# Patient Record
Sex: FEMALE | Race: WHITE | NOT HISPANIC OR LATINO | Employment: OTHER | URBAN - METROPOLITAN AREA
[De-identification: names, ages, dates, MRNs, and addresses within clinical notes are randomized per-mention and may not be internally consistent; named-entity substitution may affect disease eponyms.]

---

## 2017-01-16 RX ORDER — RANOLAZINE 500 MG/1
TABLET, FILM COATED, EXTENDED RELEASE ORAL
Qty: 180 TABLET | Refills: 1 | Status: ON HOLD | OUTPATIENT
Start: 2017-01-16 | End: 2022-11-03

## 2017-01-16 RX ORDER — ATORVASTATIN CALCIUM 20 MG/1
TABLET, FILM COATED ORAL
Qty: 90 TABLET | Refills: 1 | Status: ON HOLD | OUTPATIENT
Start: 2017-01-16 | End: 2022-11-03

## 2022-10-27 ENCOUNTER — APPOINTMENT (OUTPATIENT)
Dept: GENERAL RADIOLOGY | Facility: HOSPITAL | Age: 62
End: 2022-10-27

## 2022-10-27 ENCOUNTER — HOSPITAL ENCOUNTER (OUTPATIENT)
Facility: HOSPITAL | Age: 62
Setting detail: SURGERY ADMIT
End: 2022-10-27
Attending: THORACIC SURGERY (CARDIOTHORACIC VASCULAR SURGERY) | Admitting: THORACIC SURGERY (CARDIOTHORACIC VASCULAR SURGERY)

## 2022-10-27 ENCOUNTER — HOSPITAL ENCOUNTER (INPATIENT)
Facility: HOSPITAL | Age: 62
LOS: 9 days | Discharge: HOME | End: 2022-11-05
Attending: THORACIC SURGERY (CARDIOTHORACIC VASCULAR SURGERY) | Admitting: THORACIC SURGERY (CARDIOTHORACIC VASCULAR SURGERY)

## 2022-10-27 DIAGNOSIS — I25.10 CORONARY ARTERY DISEASE INVOLVING NATIVE CORONARY ARTERY OF NATIVE HEART WITHOUT ANGINA PECTORIS: Primary | ICD-10-CM

## 2022-10-27 DIAGNOSIS — Z00.6 ENCOUNTER FOR EXAMINATION FOR NORMAL COMPARISON AND CONTROL IN CLINICAL RESEARCH PROGRAM: ICD-10-CM

## 2022-10-27 DIAGNOSIS — I21.4 NSTEMI (NON-ST ELEVATED MYOCARDIAL INFARCTION): Primary | ICD-10-CM

## 2022-10-27 DIAGNOSIS — R13.12 OROPHARYNGEAL DYSPHAGIA: ICD-10-CM

## 2022-10-27 PROCEDURE — 93005 ELECTROCARDIOGRAM TRACING: CPT | Performed by: PHYSICIAN ASSISTANT

## 2022-10-27 PROCEDURE — 99223 1ST HOSP IP/OBS HIGH 75: CPT | Performed by: THORACIC SURGERY (CARDIOTHORACIC VASCULAR SURGERY)

## 2022-10-27 RX ORDER — ACETAMINOPHEN 325 MG/1
650 TABLET ORAL EVERY 4 HOURS PRN
Status: DISCONTINUED | OUTPATIENT
Start: 2022-10-27 | End: 2022-11-05 | Stop reason: HOSPADM

## 2022-10-27 RX ORDER — ASPIRIN 81 MG/1
81 TABLET ORAL ONCE
Status: COMPLETED | OUTPATIENT
Start: 2022-10-28 | End: 2022-10-28

## 2022-10-27 RX ORDER — ESCITALOPRAM OXALATE 10 MG/1
10 TABLET ORAL DAILY
Status: DISCONTINUED | OUTPATIENT
Start: 2022-10-28 | End: 2022-11-05 | Stop reason: HOSPADM

## 2022-10-27 RX ORDER — METOPROLOL TARTRATE 50 MG/1
50 TABLET, FILM COATED ORAL EVERY 12 HOURS SCHEDULED
Status: DISCONTINUED | OUTPATIENT
Start: 2022-10-28 | End: 2022-10-28 | Stop reason: SDUPTHER

## 2022-10-27 RX ORDER — IPRATROPIUM BROMIDE AND ALBUTEROL SULFATE 2.5; .5 MG/3ML; MG/3ML
3 SOLUTION RESPIRATORY (INHALATION) EVERY 4 HOURS PRN
Status: DISCONTINUED | OUTPATIENT
Start: 2022-10-27 | End: 2022-11-05 | Stop reason: HOSPADM

## 2022-10-27 RX ORDER — CHLORHEXIDINE GLUCONATE 0.12 MG/ML
15 RINSE ORAL EVERY 12 HOURS
Status: DISCONTINUED | OUTPATIENT
Start: 2022-10-28 | End: 2022-10-28 | Stop reason: SDUPTHER

## 2022-10-27 RX ORDER — CHLORHEXIDINE GLUCONATE 500 MG/1
1 CLOTH TOPICAL EVERY 12 HOURS PRN
Status: DISCONTINUED | OUTPATIENT
Start: 2022-10-27 | End: 2022-10-28

## 2022-10-27 RX ORDER — SODIUM CHLORIDE 0.9 % (FLUSH) 0.9 %
10 SYRINGE (ML) INJECTION AS NEEDED
Status: DISCONTINUED | OUTPATIENT
Start: 2022-10-27 | End: 2022-11-05 | Stop reason: HOSPADM

## 2022-10-27 RX ORDER — ATORVASTATIN CALCIUM 20 MG/1
20 TABLET, FILM COATED ORAL NIGHTLY
Status: DISCONTINUED | OUTPATIENT
Start: 2022-10-27 | End: 2022-10-28 | Stop reason: SDUPTHER

## 2022-10-27 RX ORDER — SODIUM CHLORIDE 0.9 % (FLUSH) 0.9 %
10 SYRINGE (ML) INJECTION EVERY 12 HOURS SCHEDULED
Status: DISCONTINUED | OUTPATIENT
Start: 2022-10-28 | End: 2022-11-05 | Stop reason: HOSPADM

## 2022-10-27 RX ORDER — RANOLAZINE 500 MG/1
500 TABLET, EXTENDED RELEASE ORAL 2 TIMES DAILY
Status: DISCONTINUED | OUTPATIENT
Start: 2022-10-28 | End: 2022-10-28

## 2022-10-28 ENCOUNTER — APPOINTMENT (OUTPATIENT)
Dept: CARDIOLOGY | Facility: HOSPITAL | Age: 62
End: 2022-10-28

## 2022-10-28 ENCOUNTER — ANESTHESIA (OUTPATIENT)
Dept: PERIOP | Facility: HOSPITAL | Age: 62
End: 2022-10-28

## 2022-10-28 ENCOUNTER — ANESTHESIA EVENT CONVERTED (OUTPATIENT)
Dept: ANESTHESIOLOGY | Facility: HOSPITAL | Age: 62
End: 2022-10-28

## 2022-10-28 ENCOUNTER — APPOINTMENT (OUTPATIENT)
Dept: PULMONOLOGY | Facility: HOSPITAL | Age: 62
End: 2022-10-28

## 2022-10-28 ENCOUNTER — APPOINTMENT (OUTPATIENT)
Dept: GENERAL RADIOLOGY | Facility: HOSPITAL | Age: 62
End: 2022-10-28

## 2022-10-28 ENCOUNTER — ANESTHESIA EVENT (OUTPATIENT)
Dept: PERIOP | Facility: HOSPITAL | Age: 62
End: 2022-10-28

## 2022-10-28 PROBLEM — F19.90 ILLICIT DRUG USE: Status: ACTIVE | Noted: 2022-10-28

## 2022-10-28 PROBLEM — I10 HYPERTENSION: Chronic | Status: ACTIVE | Noted: 2022-10-28

## 2022-10-28 PROBLEM — Z72.0 TOBACCO USE: Status: ACTIVE | Noted: 2022-10-28

## 2022-10-28 PROBLEM — Z98.84 HX OF LAPAROSCOPIC GASTRIC BANDING: Status: ACTIVE | Noted: 2022-10-28

## 2022-10-28 PROBLEM — K21.9 GERD (GASTROESOPHAGEAL REFLUX DISEASE): Status: ACTIVE | Noted: 2022-10-28

## 2022-10-28 PROBLEM — E11.9 T2DM (TYPE 2 DIABETES MELLITUS) (HCC): Chronic | Status: ACTIVE | Noted: 2022-10-28

## 2022-10-28 PROBLEM — I21.4 NSTEMI (NON-ST ELEVATED MYOCARDIAL INFARCTION): Status: ACTIVE | Noted: 2022-10-28

## 2022-10-28 PROBLEM — E11.9 T2DM (TYPE 2 DIABETES MELLITUS) (HCC): Status: ACTIVE | Noted: 2022-10-28

## 2022-10-28 PROBLEM — I10 HYPERTENSION: Status: ACTIVE | Noted: 2022-10-28

## 2022-10-28 PROBLEM — Z72.0 TOBACCO USE: Chronic | Status: ACTIVE | Noted: 2022-10-28

## 2022-10-28 PROBLEM — I25.10 CAD (CORONARY ARTERY DISEASE): Chronic | Status: ACTIVE | Noted: 2022-10-27

## 2022-10-28 PROBLEM — K21.9 GERD (GASTROESOPHAGEAL REFLUX DISEASE): Chronic | Status: ACTIVE | Noted: 2022-10-28

## 2022-10-28 PROBLEM — E78.5 DYSLIPIDEMIA: Status: ACTIVE | Noted: 2022-10-28

## 2022-10-28 PROBLEM — E78.5 DYSLIPIDEMIA: Chronic | Status: ACTIVE | Noted: 2022-10-28

## 2022-10-28 PROBLEM — F19.90 ILLICIT DRUG USE: Chronic | Status: ACTIVE | Noted: 2022-10-28

## 2022-10-28 PROBLEM — Z98.84 HX OF LAPAROSCOPIC GASTRIC BANDING: Chronic | Status: ACTIVE | Noted: 2022-10-28

## 2022-10-28 LAB
ABO GROUP BLD: NORMAL
ABO GROUP BLD: NORMAL
ALBUMIN SERPL-MCNC: 3.3 G/DL (ref 3.5–5.2)
ALBUMIN SERPL-MCNC: 3.7 G/DL (ref 3.5–5.2)
ALBUMIN SERPL-MCNC: 3.9 G/DL (ref 3.5–5.2)
ALBUMIN/GLOB SERPL: 1.1 G/DL
ALP SERPL-CCNC: 110 U/L (ref 39–117)
ALT SERPL W P-5'-P-CCNC: 22 U/L (ref 1–33)
AMPHET+METHAMPHET UR QL: POSITIVE
AMPHETAMINES UR QL: POSITIVE
ANION GAP SERPL CALCULATED.3IONS-SCNC: 10 MMOL/L (ref 5–15)
ANION GAP SERPL CALCULATED.3IONS-SCNC: 11 MMOL/L (ref 5–15)
ANION GAP SERPL CALCULATED.3IONS-SCNC: 12 MMOL/L (ref 5–15)
APTT PPP: 26.8 SECONDS (ref 22–39)
APTT PPP: 28.2 SECONDS (ref 22–39)
ARTERIAL PATENCY WRIST A: ABNORMAL
AST SERPL-CCNC: 21 U/L (ref 1–32)
ATMOSPHERIC PRESS: ABNORMAL MM[HG]
BARBITURATES UR QL SCN: NEGATIVE
BASE EXCESS BLDA CALC-SCNC: -2 MMOL/L (ref 0–2)
BDY SITE: ABNORMAL
BENZODIAZ UR QL SCN: NEGATIVE
BH CV ECHO MEAS - AO MAX PG: 6.9 MMHG
BH CV ECHO MEAS - AO MEAN PG: 4 MMHG
BH CV ECHO MEAS - AO ROOT DIAM: 2.8 CM
BH CV ECHO MEAS - AO V2 MAX: 131.5 CM/SEC
BH CV ECHO MEAS - AO V2 VTI: 29.5 CM
BH CV ECHO MEAS - AVA(I,D): 2.26 CM2
BH CV ECHO MEAS - EDV(CUBED): 300.8 ML
BH CV ECHO MEAS - EDV(MOD-SP2): 165 ML
BH CV ECHO MEAS - EDV(MOD-SP4): 166 ML
BH CV ECHO MEAS - EF(MOD-BP): 48.5 %
BH CV ECHO MEAS - EF(MOD-SP2): 46.1 %
BH CV ECHO MEAS - EF(MOD-SP4): 53.1 %
BH CV ECHO MEAS - ESV(CUBED): 117.6 ML
BH CV ECHO MEAS - ESV(MOD-SP2): 89 ML
BH CV ECHO MEAS - ESV(MOD-SP4): 77.8 ML
BH CV ECHO MEAS - FS: 26.9 %
BH CV ECHO MEAS - IVS/LVPW: 1 CM
BH CV ECHO MEAS - IVSD: 0.8 CM
BH CV ECHO MEAS - LA DIMENSION: 3.8 CM
BH CV ECHO MEAS - LAT PEAK E' VEL: 10.1 CM/SEC
BH CV ECHO MEAS - LV MASS(C)D: 226.1 GRAMS
BH CV ECHO MEAS - LV MAX PG: 4.2 MMHG
BH CV ECHO MEAS - LV MEAN PG: 2 MMHG
BH CV ECHO MEAS - LV V1 MAX: 102.5 CM/SEC
BH CV ECHO MEAS - LV V1 VTI: 21.2 CM
BH CV ECHO MEAS - LVIDD: 6.7 CM
BH CV ECHO MEAS - LVIDS: 4.9 CM
BH CV ECHO MEAS - LVOT AREA: 3.1 CM2
BH CV ECHO MEAS - LVOT DIAM: 2 CM
BH CV ECHO MEAS - LVPWD: 0.8 CM
BH CV ECHO MEAS - MED PEAK E' VEL: 6.9 CM/SEC
BH CV ECHO MEAS - MR MAX PG: 99.4 MMHG
BH CV ECHO MEAS - MR MAX VEL: 498.5 CM/SEC
BH CV ECHO MEAS - MR MEAN PG: 70.5 MMHG
BH CV ECHO MEAS - MR MEAN VEL: 404 CM/SEC
BH CV ECHO MEAS - MR VTI: 189.5 CM
BH CV ECHO MEAS - MV A MAX VEL: 83.2 CM/SEC
BH CV ECHO MEAS - MV DEC SLOPE: 449 CM/SEC2
BH CV ECHO MEAS - MV DEC TIME: 0.22 MSEC
BH CV ECHO MEAS - MV E MAX VEL: 101 CM/SEC
BH CV ECHO MEAS - MV E/A: 1.21
BH CV ECHO MEAS - MV MAX PG: 4.5 MMHG
BH CV ECHO MEAS - MV MEAN PG: 2 MMHG
BH CV ECHO MEAS - MV P1/2T: 72.4 MSEC
BH CV ECHO MEAS - MV V2 VTI: 36.9 CM
BH CV ECHO MEAS - MVA(P1/2T): 3 CM2
BH CV ECHO MEAS - MVA(VTI): 1.8 CM2
BH CV ECHO MEAS - PA ACC TIME: 0.13 SEC
BH CV ECHO MEAS - PA PR(ACCEL): 20.5 MMHG
BH CV ECHO MEAS - PA V2 MAX: 98.8 CM/SEC
BH CV ECHO MEAS - RF(MV,LVOT)(1DIAM): 0.79 CM
BH CV ECHO MEAS - SV(LVOT): 66.4 ML
BH CV ECHO MEAS - SV(MOD-SP2): 76 ML
BH CV ECHO MEAS - SV(MOD-SP4): 88.2 ML
BH CV ECHO MEAS - TAPSE (>1.6): 1.75 CM
BH CV ECHO MEASUREMENTS AVERAGE E/E' RATIO: 11.88
BH CV VAS BP RIGHT ARM: NORMAL MMHG
BH CV XLRA - RV BASE: 3.2 CM
BH CV XLRA - RV LENGTH: 6.4 CM
BH CV XLRA - RV MID: 2.3 CM
BH CV XLRA - TDI S': 13.3 CM/SEC
BH CV XLRA MEAS LEFT DIST CCA EDV: 24.7 CM/SEC
BH CV XLRA MEAS LEFT DIST CCA PSV: 83.4 CM/SEC
BH CV XLRA MEAS LEFT DIST ICA EDV: 22.4 CM/SEC
BH CV XLRA MEAS LEFT DIST ICA PSV: 54.4 CM/SEC
BH CV XLRA MEAS LEFT ICA/CCA RATIO: 1.67
BH CV XLRA MEAS LEFT MID CCA EDV: 23 CM/SEC
BH CV XLRA MEAS LEFT MID CCA PSV: 64.8 CM/SEC
BH CV XLRA MEAS LEFT MID ICA EDV: 40.6 CM/SEC
BH CV XLRA MEAS LEFT MID ICA PSV: 108 CM/SEC
BH CV XLRA MEAS LEFT PROX CCA EDV: 32.2 CM/SEC
BH CV XLRA MEAS LEFT PROX CCA PSV: 76.4 CM/SEC
BH CV XLRA MEAS LEFT PROX ECA EDV: 10.8 CM/SEC
BH CV XLRA MEAS LEFT PROX ECA PSV: 58.5 CM/SEC
BH CV XLRA MEAS LEFT PROX ICA EDV: 27.8 CM/SEC
BH CV XLRA MEAS LEFT PROX ICA PSV: 63.4 CM/SEC
BH CV XLRA MEAS LEFT PROX SCLA PSV: 107 CM/SEC
BH CV XLRA MEAS LEFT VERTEBRAL A EDV: 14.1 CM/SEC
BH CV XLRA MEAS LEFT VERTEBRAL A PSV: 34.9 CM/SEC
BH CV XLRA MEAS RIGHT DIST CCA EDV: 22.7 CM/SEC
BH CV XLRA MEAS RIGHT DIST CCA PSV: 54.1 CM/SEC
BH CV XLRA MEAS RIGHT DIST ICA EDV: 45.1 CM/SEC
BH CV XLRA MEAS RIGHT DIST ICA PSV: 149 CM/SEC
BH CV XLRA MEAS RIGHT ICA/CCA RATIO: 2.6
BH CV XLRA MEAS RIGHT MID CCA EDV: 20 CM/SEC
BH CV XLRA MEAS RIGHT MID CCA PSV: 53.3 CM/SEC
BH CV XLRA MEAS RIGHT MID ICA EDV: 62.4 CM/SEC
BH CV XLRA MEAS RIGHT MID ICA PSV: 138 CM/SEC
BH CV XLRA MEAS RIGHT PROX CCA EDV: 20.1 CM/SEC
BH CV XLRA MEAS RIGHT PROX CCA PSV: 59 CM/SEC
BH CV XLRA MEAS RIGHT PROX ECA EDV: 22.7 CM/SEC
BH CV XLRA MEAS RIGHT PROX ECA PSV: 95.6 CM/SEC
BH CV XLRA MEAS RIGHT PROX ICA EDV: 32.3 CM/SEC
BH CV XLRA MEAS RIGHT PROX ICA PSV: 77.7 CM/SEC
BH CV XLRA MEAS RIGHT PROX SCLA PSV: 125 CM/SEC
BH CV XLRA MEAS RIGHT VERTEBRAL A EDV: 20.9 CM/SEC
BH CV XLRA MEAS RIGHT VERTEBRAL A PSV: 57.1 CM/SEC
BILIRUB SERPL-MCNC: 0.4 MG/DL (ref 0–1.2)
BLD GP AB SCN SERPL QL: NEGATIVE
BODY TEMPERATURE: 37 C
BUN SERPL-MCNC: 15 MG/DL (ref 8–23)
BUN SERPL-MCNC: 24 MG/DL (ref 8–23)
BUN SERPL-MCNC: 25 MG/DL (ref 8–23)
BUN/CREAT SERPL: 18.8 (ref 7–25)
BUN/CREAT SERPL: 21.4 (ref 7–25)
BUN/CREAT SERPL: 24.3 (ref 7–25)
BUPRENORPHINE SERPL-MCNC: NEGATIVE NG/ML
CA-I SERPL ISE-MCNC: 1.43 MMOL/L (ref 1.12–1.32)
CALCIUM SPEC-SCNC: 8.5 MG/DL (ref 8.6–10.5)
CALCIUM SPEC-SCNC: 9.4 MG/DL (ref 8.6–10.5)
CALCIUM SPEC-SCNC: 9.6 MG/DL (ref 8.6–10.5)
CANNABINOIDS SERPL QL: NEGATIVE
CHLORIDE SERPL-SCNC: 101 MMOL/L (ref 98–107)
CHLORIDE SERPL-SCNC: 109 MMOL/L (ref 98–107)
CHLORIDE SERPL-SCNC: 110 MMOL/L (ref 98–107)
CHOLEST SERPL-MCNC: 192 MG/DL (ref 0–200)
CO2 BLDA-SCNC: 24.8 MMOL/L (ref 22–33)
CO2 SERPL-SCNC: 25 MMOL/L (ref 22–29)
CO2 SERPL-SCNC: 26 MMOL/L (ref 22–29)
CO2 SERPL-SCNC: 26 MMOL/L (ref 22–29)
COCAINE UR QL: POSITIVE
COHGB MFR BLD: 0.8 % (ref 0–2)
CREAT SERPL-MCNC: 0.8 MG/DL (ref 0.57–1)
CREAT SERPL-MCNC: 1.03 MG/DL (ref 0.57–1)
CREAT SERPL-MCNC: 1.12 MG/DL (ref 0.57–1)
DEPRECATED RDW RBC AUTO: 41.6 FL (ref 37–54)
DEPRECATED RDW RBC AUTO: 45.7 FL (ref 37–54)
DEPRECATED RDW RBC AUTO: 47.3 FL (ref 37–54)
EGFRCR SERPLBLD CKD-EPI 2021: 56.1 ML/MIN/1.73
EGFRCR SERPLBLD CKD-EPI 2021: 62 ML/MIN/1.73
EGFRCR SERPLBLD CKD-EPI 2021: 83.9 ML/MIN/1.73
EPAP: 0
ERYTHROCYTE [DISTWIDTH] IN BLOOD BY AUTOMATED COUNT: 12.7 % (ref 12.3–15.4)
ERYTHROCYTE [DISTWIDTH] IN BLOOD BY AUTOMATED COUNT: 13.2 % (ref 12.3–15.4)
ERYTHROCYTE [DISTWIDTH] IN BLOOD BY AUTOMATED COUNT: 13.5 % (ref 12.3–15.4)
GLOBULIN UR ELPH-MCNC: 3.5 GM/DL
GLUCOSE BLDC GLUCOMTR-MCNC: 115 MG/DL (ref 70–130)
GLUCOSE BLDC GLUCOMTR-MCNC: 117 MG/DL (ref 70–130)
GLUCOSE BLDC GLUCOMTR-MCNC: 120 MG/DL (ref 70–130)
GLUCOSE BLDC GLUCOMTR-MCNC: 135 MG/DL (ref 70–130)
GLUCOSE BLDC GLUCOMTR-MCNC: 137 MG/DL (ref 70–130)
GLUCOSE BLDC GLUCOMTR-MCNC: 147 MG/DL (ref 70–130)
GLUCOSE BLDC GLUCOMTR-MCNC: 191 MG/DL (ref 70–130)
GLUCOSE BLDC GLUCOMTR-MCNC: 300 MG/DL (ref 70–130)
GLUCOSE BLDC GLUCOMTR-MCNC: 319 MG/DL (ref 70–130)
GLUCOSE BLDC GLUCOMTR-MCNC: 370 MG/DL (ref 70–130)
GLUCOSE BLDC GLUCOMTR-MCNC: 96 MG/DL (ref 70–130)
GLUCOSE SERPL-MCNC: 134 MG/DL (ref 65–99)
GLUCOSE SERPL-MCNC: 171 MG/DL (ref 65–99)
GLUCOSE SERPL-MCNC: 382 MG/DL (ref 65–99)
HBA1C MFR BLD: 9.1 % (ref 4.8–5.6)
HCO3 BLDA-SCNC: 23.5 MMOL/L (ref 20–26)
HCT VFR BLD AUTO: 37.2 % (ref 34–46.6)
HCT VFR BLD AUTO: 37.6 % (ref 34–46.6)
HCT VFR BLD AUTO: 44.7 % (ref 34–46.6)
HCT VFR BLD CALC: 36 % (ref 38–51)
HDLC SERPL-MCNC: 40 MG/DL (ref 40–60)
HGB BLD-MCNC: 12.2 G/DL (ref 12–15.9)
HGB BLD-MCNC: 12.2 G/DL (ref 12–15.9)
HGB BLD-MCNC: 14.7 G/DL (ref 12–15.9)
HGB BLDA-MCNC: 11.8 G/DL (ref 14–18)
INHALED O2 CONCENTRATION: 50 %
INR PPP: 0.98 (ref 0.84–1.13)
INR PPP: 1.37 (ref 0.84–1.13)
IPAP: 0
LDLC SERPL CALC-MCNC: 133 MG/DL (ref 0–100)
LDLC/HDLC SERPL: 3.28 {RATIO}
LEFT ATRIUM VOLUME INDEX: 48.4 ML/M2
Lab: 3 %
MAGNESIUM SERPL-MCNC: 1.8 MG/DL (ref 1.6–2.4)
MAXIMAL PREDICTED HEART RATE: 159 BPM
MAXIMAL PREDICTED HEART RATE: 159 BPM
MCH RBC QN AUTO: 29.8 PG (ref 26.6–33)
MCH RBC QN AUTO: 30.6 PG (ref 26.6–33)
MCH RBC QN AUTO: 31.2 PG (ref 26.6–33)
MCHC RBC AUTO-ENTMCNC: 32.4 G/DL (ref 31.5–35.7)
MCHC RBC AUTO-ENTMCNC: 32.8 G/DL (ref 31.5–35.7)
MCHC RBC AUTO-ENTMCNC: 32.9 G/DL (ref 31.5–35.7)
MCV RBC AUTO: 90.7 FL (ref 79–97)
MCV RBC AUTO: 94.2 FL (ref 79–97)
MCV RBC AUTO: 95.1 FL (ref 79–97)
METHADONE UR QL SCN: NEGATIVE
METHGB BLD QL: 0.7 % (ref 0–1.5)
MODALITY: ABNORMAL
NOTE: ABNORMAL
OPIATES UR QL: NEGATIVE
OXYCODONE UR QL SCN: NEGATIVE
OXYHGB MFR BLDV: 97.5 % (ref 94–99)
PA ADP PRP-ACNC: 205 PRU
PAW @ PEAK INSP FLOW SETTING VENT: 0 CMH2O
PCO2 BLDA: 42.3 MM HG (ref 35–45)
PCO2 TEMP ADJ BLD: 42.3 MM HG (ref 35–45)
PCP UR QL SCN: NEGATIVE
PEEP RESPIRATORY: 5 CM[H2O]
PH BLDA: 7.35 PH UNITS (ref 7.35–7.45)
PH, TEMP CORRECTED: 7.35 PH UNITS
PHOSPHATE SERPL-MCNC: 4.7 MG/DL (ref 2.5–4.5)
PHOSPHATE SERPL-MCNC: 5.2 MG/DL (ref 2.5–4.5)
PHOSPHATE SERPL-MCNC: 5.2 MG/DL (ref 2.5–4.5)
PLATELET # BLD AUTO: 239 10*3/MM3 (ref 140–450)
PLATELET # BLD AUTO: 269 10*3/MM3 (ref 140–450)
PLATELET # BLD AUTO: 273 10*3/MM3 (ref 140–450)
PMV BLD AUTO: 10.3 FL (ref 6–12)
PMV BLD AUTO: 10.3 FL (ref 6–12)
PMV BLD AUTO: 11.1 FL (ref 6–12)
PO2 BLDA: 128 MM HG (ref 83–108)
PO2 TEMP ADJ BLD: 128 MM HG (ref 83–108)
POTASSIUM SERPL-SCNC: 4.3 MMOL/L (ref 3.5–5.2)
POTASSIUM SERPL-SCNC: 4.6 MMOL/L (ref 3.5–5.2)
POTASSIUM SERPL-SCNC: 4.6 MMOL/L (ref 3.5–5.2)
PROPOXYPH UR QL: NEGATIVE
PROT SERPL-MCNC: 7.4 G/DL (ref 6–8.5)
PROTHROMBIN TIME: 12.9 SECONDS (ref 11.4–14.4)
PROTHROMBIN TIME: 16.8 SECONDS (ref 11.4–14.4)
QT INTERVAL: 442 MS
QTC INTERVAL: 534 MS
RBC # BLD AUTO: 3.91 10*6/MM3 (ref 3.77–5.28)
RBC # BLD AUTO: 3.99 10*6/MM3 (ref 3.77–5.28)
RBC # BLD AUTO: 4.93 10*6/MM3 (ref 3.77–5.28)
RH BLD: POSITIVE
RH BLD: POSITIVE
SODIUM SERPL-SCNC: 139 MMOL/L (ref 136–145)
SODIUM SERPL-SCNC: 145 MMOL/L (ref 136–145)
SODIUM SERPL-SCNC: 146 MMOL/L (ref 136–145)
STRESS TARGET HR: 135 BPM
STRESS TARGET HR: 135 BPM
T&S EXPIRATION DATE: NORMAL
TOTAL RATE: 0 BREATHS/MINUTE
TRICYCLICS UR QL SCN: NEGATIVE
TRIGL SERPL-MCNC: 104 MG/DL (ref 0–150)
VENTILATOR MODE: ABNORMAL
VLDLC SERPL-MCNC: 19 MG/DL (ref 5–40)
WBC NRBC COR # BLD: 24.01 10*3/MM3 (ref 3.4–10.8)
WBC NRBC COR # BLD: 27.47 10*3/MM3 (ref 3.4–10.8)
WBC NRBC COR # BLD: 8.7 10*3/MM3 (ref 3.4–10.8)

## 2022-10-28 PROCEDURE — 86900 BLOOD TYPING SEROLOGIC ABO: CPT

## 2022-10-28 PROCEDURE — 25010000002 PROPOFOL 10 MG/ML EMULSION: Performed by: ANESTHESIOLOGY

## 2022-10-28 PROCEDURE — 82330 ASSAY OF CALCIUM: CPT

## 2022-10-28 PROCEDURE — 25010000002 HEPARIN (PORCINE) PER 1000 UNITS: Performed by: THORACIC SURGERY (CARDIOTHORACIC VASCULAR SURGERY)

## 2022-10-28 PROCEDURE — 33533 CABG ARTERIAL SINGLE: CPT | Performed by: PHYSICIAN ASSISTANT

## 2022-10-28 PROCEDURE — 93306 TTE W/DOPPLER COMPLETE: CPT | Performed by: INTERNAL MEDICINE

## 2022-10-28 PROCEDURE — 85347 COAGULATION TIME ACTIVATED: CPT

## 2022-10-28 PROCEDURE — 82330 ASSAY OF CALCIUM: CPT | Performed by: PHYSICIAN ASSISTANT

## 2022-10-28 PROCEDURE — 82962 GLUCOSE BLOOD TEST: CPT

## 2022-10-28 PROCEDURE — 85610 PROTHROMBIN TIME: CPT | Performed by: PHYSICIAN ASSISTANT

## 2022-10-28 PROCEDURE — 86900 BLOOD TYPING SEROLOGIC ABO: CPT | Performed by: PHYSICIAN ASSISTANT

## 2022-10-28 PROCEDURE — 25010000002 FENTANYL CITRATE (PF) 50 MCG/ML SOLUTION: Performed by: ANESTHESIOLOGY

## 2022-10-28 PROCEDURE — 99222 1ST HOSP IP/OBS MODERATE 55: CPT | Performed by: INTERNAL MEDICINE

## 2022-10-28 PROCEDURE — 85576 BLOOD PLATELET AGGREGATION: CPT | Performed by: PHYSICIAN ASSISTANT

## 2022-10-28 PROCEDURE — 33508 ENDOSCOPIC VEIN HARVEST: CPT | Performed by: THORACIC SURGERY (CARDIOTHORACIC VASCULAR SURGERY)

## 2022-10-28 PROCEDURE — 94799 UNLISTED PULMONARY SVC/PX: CPT

## 2022-10-28 PROCEDURE — 83735 ASSAY OF MAGNESIUM: CPT | Performed by: PHYSICIAN ASSISTANT

## 2022-10-28 PROCEDURE — 25010000002 PROTAMINE SULFATE PER 10 MG: Performed by: PHYSICIAN ASSISTANT

## 2022-10-28 PROCEDURE — 82803 BLOOD GASES ANY COMBINATION: CPT

## 2022-10-28 PROCEDURE — 0 MAGNESIUM SULFATE 4 GM/100ML SOLUTION: Performed by: PHYSICIAN ASSISTANT

## 2022-10-28 PROCEDURE — 93005 ELECTROCARDIOGRAM TRACING: CPT | Performed by: PHYSICIAN ASSISTANT

## 2022-10-28 PROCEDURE — 25010000002 MIDAZOLAM PER 1 MG: Performed by: ANESTHESIOLOGY

## 2022-10-28 PROCEDURE — 85027 COMPLETE CBC AUTOMATED: CPT | Performed by: PHYSICIAN ASSISTANT

## 2022-10-28 PROCEDURE — P9100 PATHOGEN TEST FOR PLATELETS: HCPCS

## 2022-10-28 PROCEDURE — 83036 HEMOGLOBIN GLYCOSYLATED A1C: CPT | Performed by: PHYSICIAN ASSISTANT

## 2022-10-28 PROCEDURE — 93880 EXTRACRANIAL BILAT STUDY: CPT

## 2022-10-28 PROCEDURE — 25010000002 PAPAVERINE PER 60 MG: Performed by: THORACIC SURGERY (CARDIOTHORACIC VASCULAR SURGERY)

## 2022-10-28 PROCEDURE — 25810000003 DEXTROSE 5 % WITH KCL 20 MEQ 20-5 MEQ/L-% SOLUTION: Performed by: PHYSICIAN ASSISTANT

## 2022-10-28 PROCEDURE — 33518 CABG ARTERY-VEIN TWO: CPT | Performed by: THORACIC SURGERY (CARDIOTHORACIC VASCULAR SURGERY)

## 2022-10-28 PROCEDURE — C1751 CATH, INF, PER/CENT/MIDLINE: HCPCS | Performed by: ANESTHESIOLOGY

## 2022-10-28 PROCEDURE — 5A1221Z PERFORMANCE OF CARDIAC OUTPUT, CONTINUOUS: ICD-10-PCS | Performed by: THORACIC SURGERY (CARDIOTHORACIC VASCULAR SURGERY)

## 2022-10-28 PROCEDURE — 93010 ELECTROCARDIOGRAM REPORT: CPT | Performed by: INTERNAL MEDICINE

## 2022-10-28 PROCEDURE — 021109W BYPASS CORONARY ARTERY, TWO ARTERIES FROM AORTA WITH AUTOLOGOUS VENOUS TISSUE, OPEN APPROACH: ICD-10-PCS | Performed by: THORACIC SURGERY (CARDIOTHORACIC VASCULAR SURGERY)

## 2022-10-28 PROCEDURE — 80053 COMPREHEN METABOLIC PANEL: CPT | Performed by: PHYSICIAN ASSISTANT

## 2022-10-28 PROCEDURE — P9016 RBC LEUKOCYTES REDUCED: HCPCS

## 2022-10-28 PROCEDURE — 85730 THROMBOPLASTIN TIME PARTIAL: CPT | Performed by: PHYSICIAN ASSISTANT

## 2022-10-28 PROCEDURE — 71045 X-RAY EXAM CHEST 1 VIEW: CPT

## 2022-10-28 PROCEDURE — 25010000002 GENTAMICIN PER 80 MG: Performed by: THORACIC SURGERY (CARDIOTHORACIC VASCULAR SURGERY)

## 2022-10-28 PROCEDURE — 86923 COMPATIBILITY TEST ELECTRIC: CPT

## 2022-10-28 PROCEDURE — 25010000002 PROTAMINE SULFATE PER 10 MG: Performed by: ANESTHESIOLOGY

## 2022-10-28 PROCEDURE — 63710000001 INSULIN REGULAR HUMAN PER 5 UNITS: Performed by: ANESTHESIOLOGY

## 2022-10-28 PROCEDURE — 82947 ASSAY GLUCOSE BLOOD QUANT: CPT

## 2022-10-28 PROCEDURE — 86850 RBC ANTIBODY SCREEN: CPT | Performed by: PHYSICIAN ASSISTANT

## 2022-10-28 PROCEDURE — 84295 ASSAY OF SERUM SODIUM: CPT

## 2022-10-28 PROCEDURE — P9035 PLATELET PHERES LEUKOREDUCED: HCPCS

## 2022-10-28 PROCEDURE — 94002 VENT MGMT INPAT INIT DAY: CPT

## 2022-10-28 PROCEDURE — 02100Z9 BYPASS CORONARY ARTERY, ONE ARTERY FROM LEFT INTERNAL MAMMARY, OPEN APPROACH: ICD-10-PCS | Performed by: THORACIC SURGERY (CARDIOTHORACIC VASCULAR SURGERY)

## 2022-10-28 PROCEDURE — 84100 ASSAY OF PHOSPHORUS: CPT | Performed by: THORACIC SURGERY (CARDIOTHORACIC VASCULAR SURGERY)

## 2022-10-28 PROCEDURE — 86901 BLOOD TYPING SEROLOGIC RH(D): CPT

## 2022-10-28 PROCEDURE — 80061 LIPID PANEL: CPT | Performed by: PHYSICIAN ASSISTANT

## 2022-10-28 PROCEDURE — 33533 CABG ARTERIAL SINGLE: CPT | Performed by: THORACIC SURGERY (CARDIOTHORACIC VASCULAR SURGERY)

## 2022-10-28 PROCEDURE — 33508 ENDOSCOPIC VEIN HARVEST: CPT | Performed by: PHYSICIAN ASSISTANT

## 2022-10-28 PROCEDURE — 80306 DRUG TEST PRSMV INSTRMNT: CPT | Performed by: PHYSICIAN ASSISTANT

## 2022-10-28 PROCEDURE — 25010000002 VANCOMYCIN 10 G RECONSTITUTED SOLUTION: Performed by: THORACIC SURGERY (CARDIOTHORACIC VASCULAR SURGERY)

## 2022-10-28 PROCEDURE — 84100 ASSAY OF PHOSPHORUS: CPT | Performed by: PHYSICIAN ASSISTANT

## 2022-10-28 PROCEDURE — 84132 ASSAY OF SERUM POTASSIUM: CPT

## 2022-10-28 PROCEDURE — 82805 BLOOD GASES W/O2 SATURATION: CPT

## 2022-10-28 PROCEDURE — 86901 BLOOD TYPING SEROLOGIC RH(D): CPT | Performed by: PHYSICIAN ASSISTANT

## 2022-10-28 PROCEDURE — 25010000002 HEPARIN (PORCINE) PER 1000 UNITS: Performed by: ANESTHESIOLOGY

## 2022-10-28 PROCEDURE — 93306 TTE W/DOPPLER COMPLETE: CPT

## 2022-10-28 PROCEDURE — 0 DOPAMINE PER 40 MG: Performed by: ANESTHESIOLOGY

## 2022-10-28 PROCEDURE — 99221 1ST HOSP IP/OBS SF/LOW 40: CPT | Performed by: NURSE PRACTITIONER

## 2022-10-28 PROCEDURE — 25010000002 SULFUR HEXAFLUORIDE MICROSPH 60.7-25 MG RECONSTITUTED SUSPENSION: Performed by: PHYSICIAN ASSISTANT

## 2022-10-28 PROCEDURE — 94010 BREATHING CAPACITY TEST: CPT | Performed by: INTERNAL MEDICINE

## 2022-10-28 PROCEDURE — 25010000002 CEFAZOLIN PER 500 MG: Performed by: THORACIC SURGERY (CARDIOTHORACIC VASCULAR SURGERY)

## 2022-10-28 PROCEDURE — 25010000002 ALBUMIN HUMAN 25% PER 50 ML: Performed by: PHYSICIAN ASSISTANT

## 2022-10-28 PROCEDURE — P9047 ALBUMIN (HUMAN), 25%, 50ML: HCPCS | Performed by: PHYSICIAN ASSISTANT

## 2022-10-28 PROCEDURE — 83050 HGB METHEMOGLOBIN QUAN: CPT

## 2022-10-28 PROCEDURE — 82375 ASSAY CARBOXYHB QUANT: CPT

## 2022-10-28 PROCEDURE — 85014 HEMATOCRIT: CPT

## 2022-10-28 PROCEDURE — 93880 EXTRACRANIAL BILAT STUDY: CPT | Performed by: INTERNAL MEDICINE

## 2022-10-28 PROCEDURE — 36430 TRANSFUSION BLD/BLD COMPNT: CPT

## 2022-10-28 PROCEDURE — 33518 CABG ARTERY-VEIN TWO: CPT | Performed by: PHYSICIAN ASSISTANT

## 2022-10-28 PROCEDURE — 25010000002 PROPOFOL 10 MG/ML EMULSION: Performed by: PHYSICIAN ASSISTANT

## 2022-10-28 PROCEDURE — C1894 INTRO/SHEATH, NON-LASER: HCPCS | Performed by: THORACIC SURGERY (CARDIOTHORACIC VASCULAR SURGERY)

## 2022-10-28 PROCEDURE — 94010 BREATHING CAPACITY TEST: CPT

## 2022-10-28 PROCEDURE — 06BP4ZZ EXCISION OF RIGHT SAPHENOUS VEIN, PERCUTANEOUS ENDOSCOPIC APPROACH: ICD-10-PCS | Performed by: THORACIC SURGERY (CARDIOTHORACIC VASCULAR SURGERY)

## 2022-10-28 PROCEDURE — 25010000002 VANCOMYCIN 10 G RECONSTITUTED SOLUTION: Performed by: PHYSICIAN ASSISTANT

## 2022-10-28 DEVICE — SEALANT HEMO TACHOSIL FIBRIN PTCH 9.5X4.8CM: Type: IMPLANTABLE DEVICE | Site: CHEST | Status: FUNCTIONAL

## 2022-10-28 DEVICE — LIGACLIP MCA MULTIPLE CLIP APPLIERS, 20 SMALL CLIPS
Type: IMPLANTABLE DEVICE | Site: LEG | Status: FUNCTIONAL
Brand: LIGACLIP

## 2022-10-28 RX ORDER — SODIUM BICARBONATE 42 MG/ML
INJECTION, SOLUTION INTRAVENOUS AS NEEDED
Status: DISCONTINUED | OUTPATIENT
Start: 2022-10-28 | End: 2022-10-28 | Stop reason: SURG

## 2022-10-28 RX ORDER — GABAPENTIN 300 MG/1
300 CAPSULE ORAL ONCE
Status: DISCONTINUED | OUTPATIENT
Start: 2022-10-28 | End: 2022-10-28 | Stop reason: HOSPADM

## 2022-10-28 RX ORDER — DOBUTAMINE HYDROCHLORIDE 100 MG/100ML
2-20 INJECTION INTRAVENOUS CONTINUOUS PRN
Status: DISCONTINUED | OUTPATIENT
Start: 2022-10-28 | End: 2022-10-30

## 2022-10-28 RX ORDER — ROCURONIUM BROMIDE 10 MG/ML
INJECTION, SOLUTION INTRAVENOUS AS NEEDED
Status: DISCONTINUED | OUTPATIENT
Start: 2022-10-28 | End: 2022-10-28 | Stop reason: SURG

## 2022-10-28 RX ORDER — DOPAMINE HYDROCHLORIDE 80 MG/100ML
INJECTION, SOLUTION INTRAVENOUS CONTINUOUS PRN
Status: DISCONTINUED | OUTPATIENT
Start: 2022-10-28 | End: 2022-10-28 | Stop reason: SURG

## 2022-10-28 RX ORDER — FAMOTIDINE 20 MG/1
20 TABLET, FILM COATED ORAL ONCE
Status: COMPLETED | OUTPATIENT
Start: 2022-10-28 | End: 2022-10-28

## 2022-10-28 RX ORDER — CALCIUM CHLORIDE 100 MG/ML
INJECTION INTRAVENOUS; INTRAVENTRICULAR AS NEEDED
Status: DISCONTINUED | OUTPATIENT
Start: 2022-10-28 | End: 2022-10-28 | Stop reason: SURG

## 2022-10-28 RX ORDER — FENTANYL CITRATE 50 UG/ML
25 INJECTION, SOLUTION INTRAMUSCULAR; INTRAVENOUS
Status: DISCONTINUED | OUTPATIENT
Start: 2022-10-28 | End: 2022-10-30

## 2022-10-28 RX ORDER — NICOTINE POLACRILEX 4 MG
15 LOZENGE BUCCAL
Status: DISCONTINUED | OUTPATIENT
Start: 2022-10-28 | End: 2022-10-28 | Stop reason: HOSPADM

## 2022-10-28 RX ORDER — MEPERIDINE HYDROCHLORIDE 25 MG/ML
25 INJECTION INTRAMUSCULAR; INTRAVENOUS; SUBCUTANEOUS EVERY 4 HOURS PRN
Status: DISCONTINUED | OUTPATIENT
Start: 2022-10-28 | End: 2022-10-29

## 2022-10-28 RX ORDER — HYDROCODONE BITARTRATE AND ACETAMINOPHEN 7.5; 325 MG/1; MG/1
1 TABLET ORAL EVERY 4 HOURS PRN
Status: DISCONTINUED | OUTPATIENT
Start: 2022-10-28 | End: 2022-11-01

## 2022-10-28 RX ORDER — PROPOFOL 10 MG/ML
VIAL (ML) INTRAVENOUS CONTINUOUS PRN
Status: DISCONTINUED | OUTPATIENT
Start: 2022-10-28 | End: 2022-10-28 | Stop reason: SURG

## 2022-10-28 RX ORDER — GLYCOPYRROLATE 0.2 MG/ML
INJECTION INTRAMUSCULAR; INTRAVENOUS AS NEEDED
Status: DISCONTINUED | OUTPATIENT
Start: 2022-10-28 | End: 2022-10-28 | Stop reason: SURG

## 2022-10-28 RX ORDER — ALBUMIN (HUMAN) 12.5 G/50ML
SOLUTION INTRAVENOUS
Status: DISPENSED
Start: 2022-10-28 | End: 2022-10-29

## 2022-10-28 RX ORDER — POTASSIUM CHLORIDE 1.5 G/1.77G
40 POWDER, FOR SOLUTION ORAL AS NEEDED
Status: DISCONTINUED | OUTPATIENT
Start: 2022-10-28 | End: 2022-11-05 | Stop reason: HOSPADM

## 2022-10-28 RX ORDER — ETOMIDATE 2 MG/ML
INJECTION INTRAVENOUS AS NEEDED
Status: DISCONTINUED | OUTPATIENT
Start: 2022-10-28 | End: 2022-10-28 | Stop reason: SURG

## 2022-10-28 RX ORDER — SODIUM CHLORIDE 0.9 % (FLUSH) 0.9 %
10 SYRINGE (ML) INJECTION EVERY 12 HOURS SCHEDULED
Status: DISCONTINUED | OUTPATIENT
Start: 2022-10-28 | End: 2022-10-28 | Stop reason: HOSPADM

## 2022-10-28 RX ORDER — GABAPENTIN 100 MG/1
100 CAPSULE ORAL EVERY 8 HOURS
Status: DISCONTINUED | OUTPATIENT
Start: 2022-10-28 | End: 2022-10-30

## 2022-10-28 RX ORDER — SODIUM CHLORIDE 9 MG/ML
INJECTION, SOLUTION INTRAVENOUS CONTINUOUS PRN
Status: DISCONTINUED | OUTPATIENT
Start: 2022-10-28 | End: 2022-10-28 | Stop reason: SURG

## 2022-10-28 RX ORDER — PROTAMINE SULFATE 10 MG/ML
INJECTION, SOLUTION INTRAVENOUS
Status: DISPENSED
Start: 2022-10-28 | End: 2022-10-29

## 2022-10-28 RX ORDER — LIDOCAINE HYDROCHLORIDE 20 MG/ML
INJECTION, SOLUTION INFILTRATION; PERINEURAL AS NEEDED
Status: DISCONTINUED | OUTPATIENT
Start: 2022-10-28 | End: 2022-10-28 | Stop reason: SURG

## 2022-10-28 RX ORDER — POTASSIUM CHLORIDE 29.8 MG/ML
20 INJECTION INTRAVENOUS
Status: DISCONTINUED | OUTPATIENT
Start: 2022-10-28 | End: 2022-11-05 | Stop reason: HOSPADM

## 2022-10-28 RX ORDER — MAGNESIUM SULFATE HEPTAHYDRATE 40 MG/ML
2 INJECTION, SOLUTION INTRAVENOUS AS NEEDED
Status: DISCONTINUED | OUTPATIENT
Start: 2022-10-28 | End: 2022-11-05 | Stop reason: HOSPADM

## 2022-10-28 RX ORDER — PROTAMINE SULFATE 10 MG/ML
INJECTION, SOLUTION INTRAVENOUS AS NEEDED
Status: DISCONTINUED | OUTPATIENT
Start: 2022-10-28 | End: 2022-10-28 | Stop reason: SURG

## 2022-10-28 RX ORDER — SODIUM CHLORIDE, SODIUM LACTATE, POTASSIUM CHLORIDE, AND CALCIUM CHLORIDE .6; .31; .03; .02 G/100ML; G/100ML; G/100ML; G/100ML
9 INJECTION, SOLUTION INTRAVENOUS CONTINUOUS
Status: DISCONTINUED | OUTPATIENT
Start: 2022-10-28 | End: 2022-10-28

## 2022-10-28 RX ORDER — AMINOCAPROIC ACID 250 MG/ML
INJECTION, SOLUTION INTRAVENOUS AS NEEDED
Status: DISCONTINUED | OUTPATIENT
Start: 2022-10-28 | End: 2022-10-28 | Stop reason: SURG

## 2022-10-28 RX ORDER — SODIUM CHLORIDE 0.9 % (FLUSH) 0.9 %
10 SYRINGE (ML) INJECTION AS NEEDED
Status: DISCONTINUED | OUTPATIENT
Start: 2022-10-28 | End: 2022-10-28 | Stop reason: HOSPADM

## 2022-10-28 RX ORDER — ALBUMIN, HUMAN INJ 5% 5 %
250 SOLUTION INTRAVENOUS AS NEEDED
Status: DISCONTINUED | OUTPATIENT
Start: 2022-10-28 | End: 2022-10-28

## 2022-10-28 RX ORDER — NITROGLYCERIN 20 MG/100ML
INJECTION INTRAVENOUS CONTINUOUS PRN
Status: DISCONTINUED | OUTPATIENT
Start: 2022-10-28 | End: 2022-10-28 | Stop reason: SURG

## 2022-10-28 RX ORDER — ASPIRIN 325 MG
325 TABLET ORAL ONCE
Status: COMPLETED | OUTPATIENT
Start: 2022-10-28 | End: 2022-10-28

## 2022-10-28 RX ORDER — ATORVASTATIN CALCIUM 40 MG/1
40 TABLET, FILM COATED ORAL NIGHTLY
Status: DISCONTINUED | OUTPATIENT
Start: 2022-10-28 | End: 2022-11-05 | Stop reason: HOSPADM

## 2022-10-28 RX ORDER — ASPIRIN 325 MG
325 TABLET, DELAYED RELEASE (ENTERIC COATED) ORAL DAILY
Status: DISCONTINUED | OUTPATIENT
Start: 2022-10-29 | End: 2022-10-30

## 2022-10-28 RX ORDER — VECURONIUM BROMIDE 1 MG/ML
INJECTION, POWDER, LYOPHILIZED, FOR SOLUTION INTRAVENOUS AS NEEDED
Status: DISCONTINUED | OUTPATIENT
Start: 2022-10-28 | End: 2022-10-28 | Stop reason: SURG

## 2022-10-28 RX ORDER — MIDAZOLAM HYDROCHLORIDE 1 MG/ML
INJECTION INTRAMUSCULAR; INTRAVENOUS AS NEEDED
Status: DISCONTINUED | OUTPATIENT
Start: 2022-10-28 | End: 2022-10-28 | Stop reason: SURG

## 2022-10-28 RX ORDER — ESMOLOL HYDROCHLORIDE 10 MG/ML
INJECTION INTRAVENOUS AS NEEDED
Status: DISCONTINUED | OUTPATIENT
Start: 2022-10-28 | End: 2022-10-28 | Stop reason: SURG

## 2022-10-28 RX ORDER — AMOXICILLIN 250 MG
2 CAPSULE ORAL 2 TIMES DAILY
Status: DISCONTINUED | OUTPATIENT
Start: 2022-10-28 | End: 2022-10-30

## 2022-10-28 RX ORDER — ACETAMINOPHEN 325 MG/1
650 TABLET ORAL EVERY 8 HOURS
Status: DISPENSED | OUTPATIENT
Start: 2022-10-28 | End: 2022-11-04

## 2022-10-28 RX ORDER — ONDANSETRON 2 MG/ML
4 INJECTION INTRAMUSCULAR; INTRAVENOUS EVERY 6 HOURS PRN
Status: DISCONTINUED | OUTPATIENT
Start: 2022-10-28 | End: 2022-11-05 | Stop reason: HOSPADM

## 2022-10-28 RX ORDER — ALBUMIN (HUMAN) 12.5 G/50ML
25 SOLUTION INTRAVENOUS AS NEEDED
Status: COMPLETED | OUTPATIENT
Start: 2022-10-28 | End: 2022-10-29

## 2022-10-28 RX ORDER — PROTAMINE SULFATE 10 MG/ML
50 INJECTION, SOLUTION INTRAVENOUS ONCE
Status: COMPLETED | OUTPATIENT
Start: 2022-10-28 | End: 2022-10-28

## 2022-10-28 RX ORDER — POTASSIUM CHLORIDE 750 MG/1
40 CAPSULE, EXTENDED RELEASE ORAL AS NEEDED
Status: DISCONTINUED | OUTPATIENT
Start: 2022-10-28 | End: 2022-11-05 | Stop reason: HOSPADM

## 2022-10-28 RX ORDER — DEXTROSE MONOHYDRATE 25 G/50ML
10-50 INJECTION, SOLUTION INTRAVENOUS
Status: DISCONTINUED | OUTPATIENT
Start: 2022-10-28 | End: 2022-10-31

## 2022-10-28 RX ORDER — DEXTROSE MONOHYDRATE 25 G/50ML
10-50 INJECTION, SOLUTION INTRAVENOUS
Status: DISCONTINUED | OUTPATIENT
Start: 2022-10-28 | End: 2022-10-28 | Stop reason: HOSPADM

## 2022-10-28 RX ORDER — NITROGLYCERIN 20 MG/100ML
5-200 INJECTION INTRAVENOUS CONTINUOUS PRN
Status: DISCONTINUED | OUTPATIENT
Start: 2022-10-28 | End: 2022-11-03

## 2022-10-28 RX ORDER — FENTANYL CITRATE 50 UG/ML
INJECTION, SOLUTION INTRAMUSCULAR; INTRAVENOUS AS NEEDED
Status: DISCONTINUED | OUTPATIENT
Start: 2022-10-28 | End: 2022-10-28 | Stop reason: SURG

## 2022-10-28 RX ORDER — SODIUM CHLORIDE 9 MG/ML
INJECTION, SOLUTION INTRAVENOUS AS NEEDED
Status: DISCONTINUED | OUTPATIENT
Start: 2022-10-28 | End: 2022-10-28 | Stop reason: HOSPADM

## 2022-10-28 RX ORDER — CHLORHEXIDINE GLUCONATE 0.12 MG/ML
15 RINSE ORAL EVERY 12 HOURS SCHEDULED
Status: DISCONTINUED | OUTPATIENT
Start: 2022-10-28 | End: 2022-10-31

## 2022-10-28 RX ORDER — OXYCODONE HYDROCHLORIDE 5 MG/1
10 TABLET ORAL EVERY 4 HOURS PRN
Status: DISCONTINUED | OUTPATIENT
Start: 2022-10-28 | End: 2022-11-04

## 2022-10-28 RX ORDER — METOPROLOL TARTRATE 5 MG/5ML
2.5 INJECTION INTRAVENOUS EVERY 6 HOURS SCHEDULED
Status: DISCONTINUED | OUTPATIENT
Start: 2022-10-28 | End: 2022-10-29

## 2022-10-28 RX ORDER — DEXMEDETOMIDINE HYDROCHLORIDE 4 UG/ML
.2-1.5 INJECTION, SOLUTION INTRAVENOUS CONTINUOUS PRN
Status: DISCONTINUED | OUTPATIENT
Start: 2022-10-28 | End: 2022-11-01

## 2022-10-28 RX ORDER — NOREPINEPHRINE BIT/0.9 % NACL 8 MG/250ML
.02-.3 INFUSION BOTTLE (ML) INTRAVENOUS CONTINUOUS PRN
Status: DISCONTINUED | OUTPATIENT
Start: 2022-10-28 | End: 2022-11-03

## 2022-10-28 RX ORDER — HEPARIN SODIUM 1000 [USP'U]/ML
INJECTION, SOLUTION INTRAVENOUS; SUBCUTANEOUS AS NEEDED
Status: DISCONTINUED | OUTPATIENT
Start: 2022-10-28 | End: 2022-10-28 | Stop reason: SURG

## 2022-10-28 RX ORDER — PAPAVERINE HYDROCHLORIDE 30 MG/ML
INJECTION INTRAMUSCULAR; INTRAVENOUS AS NEEDED
Status: DISCONTINUED | OUTPATIENT
Start: 2022-10-28 | End: 2022-10-28 | Stop reason: HOSPADM

## 2022-10-28 RX ORDER — THROMBIN HUMAN AND FIBRINOGEN 2; 5.5 [USP'U]/1; MG/1
PATCH TOPICAL AS NEEDED
Status: DISCONTINUED | OUTPATIENT
Start: 2022-10-28 | End: 2022-10-28 | Stop reason: HOSPADM

## 2022-10-28 RX ORDER — POLYETHYLENE GLYCOL 3350 17 G/17G
17 POWDER, FOR SOLUTION ORAL DAILY PRN
Status: DISCONTINUED | OUTPATIENT
Start: 2022-10-28 | End: 2022-11-05 | Stop reason: HOSPADM

## 2022-10-28 RX ORDER — BISACODYL 10 MG
10 SUPPOSITORY, RECTAL RECTAL DAILY PRN
Status: DISCONTINUED | OUTPATIENT
Start: 2022-10-28 | End: 2022-11-05 | Stop reason: HOSPADM

## 2022-10-28 RX ORDER — MAGNESIUM SULFATE HEPTAHYDRATE 40 MG/ML
4 INJECTION, SOLUTION INTRAVENOUS AS NEEDED
Status: DISCONTINUED | OUTPATIENT
Start: 2022-10-28 | End: 2022-11-05 | Stop reason: HOSPADM

## 2022-10-28 RX ORDER — NICOTINE POLACRILEX 4 MG
15 LOZENGE BUCCAL
Status: DISCONTINUED | OUTPATIENT
Start: 2022-10-28 | End: 2022-10-31

## 2022-10-28 RX ORDER — ALBUTEROL SULFATE 2.5 MG/3ML
2.5 SOLUTION RESPIRATORY (INHALATION) EVERY 4 HOURS PRN
Status: ACTIVE | OUTPATIENT
Start: 2022-10-28 | End: 2022-10-29

## 2022-10-28 RX ORDER — DOPAMINE HYDROCHLORIDE 160 MG/100ML
2-20 INJECTION, SOLUTION INTRAVENOUS CONTINUOUS PRN
Status: DISCONTINUED | OUTPATIENT
Start: 2022-10-28 | End: 2022-10-30

## 2022-10-28 RX ORDER — MORPHINE SULFATE 2 MG/ML
2 INJECTION, SOLUTION INTRAMUSCULAR; INTRAVENOUS
Status: DISCONTINUED | OUTPATIENT
Start: 2022-10-28 | End: 2022-11-02

## 2022-10-28 RX ORDER — POTASSIUM CHLORIDE, DEXTROSE MONOHYDRATE 150; 5 MG/100ML; G/100ML
30 INJECTION, SOLUTION INTRAVENOUS CONTINUOUS
Status: DISCONTINUED | OUTPATIENT
Start: 2022-10-28 | End: 2022-10-31

## 2022-10-28 RX ADMIN — VECURONIUM BROMIDE 6 MG: 1 INJECTION, POWDER, LYOPHILIZED, FOR SOLUTION INTRAVENOUS at 13:54

## 2022-10-28 RX ADMIN — FAMOTIDINE 20 MG: 20 TABLET ORAL at 10:34

## 2022-10-28 RX ADMIN — RANOLAZINE 500 MG: 500 TABLET, FILM COATED, EXTENDED RELEASE ORAL at 01:25

## 2022-10-28 RX ADMIN — VECURONIUM BROMIDE 6 MG: 1 INJECTION, POWDER, LYOPHILIZED, FOR SOLUTION INTRAVENOUS at 14:55

## 2022-10-28 RX ADMIN — ASPIRIN 81 MG: 81 TABLET, COATED ORAL at 05:55

## 2022-10-28 RX ADMIN — VANCOMYCIN HYDROCHLORIDE 1250 MG: 10 INJECTION, POWDER, LYOPHILIZED, FOR SOLUTION INTRAVENOUS at 22:13

## 2022-10-28 RX ADMIN — DEXMEDETOMIDINE HYDROCHLORIDE 0.2 MCG/KG/HR: 4 INJECTION, SOLUTION INTRAVENOUS at 22:27

## 2022-10-28 RX ADMIN — FENTANYL CITRATE 250 MCG: 50 INJECTION, SOLUTION INTRAMUSCULAR; INTRAVENOUS at 11:35

## 2022-10-28 RX ADMIN — MIDAZOLAM HYDROCHLORIDE 4 MG: 1 INJECTION, SOLUTION INTRAMUSCULAR; INTRAVENOUS at 11:35

## 2022-10-28 RX ADMIN — ASPIRIN 325 MG: 325 TABLET ORAL at 17:31

## 2022-10-28 RX ADMIN — PROPOFOL 50 MCG/KG/MIN: 10 INJECTION, EMULSION INTRAVENOUS at 22:45

## 2022-10-28 RX ADMIN — NOREPINEPHRINE BITARTRATE 0.04 MCG/KG/MIN: 1 INJECTION, SOLUTION, CONCENTRATE INTRAVENOUS at 14:17

## 2022-10-28 RX ADMIN — PROPOFOL 50 MCG/KG/MIN: 10 INJECTION, EMULSION INTRAVENOUS at 18:55

## 2022-10-28 RX ADMIN — POTASSIUM CHLORIDE AND DEXTROSE MONOHYDRATE 30 ML/HR: 150; 5 INJECTION, SOLUTION INTRAVENOUS at 16:11

## 2022-10-28 RX ADMIN — ROCURONIUM BROMIDE 100 MG: 10 INJECTION, SOLUTION INTRAVENOUS at 11:35

## 2022-10-28 RX ADMIN — FENTANYL CITRATE 100 MCG: 50 INJECTION, SOLUTION INTRAMUSCULAR; INTRAVENOUS at 12:52

## 2022-10-28 RX ADMIN — SENNOSIDES AND DOCUSATE SODIUM 2 TABLET: 50; 8.6 TABLET ORAL at 21:44

## 2022-10-28 RX ADMIN — MIDAZOLAM HYDROCHLORIDE 1 MG: 1 INJECTION, SOLUTION INTRAMUSCULAR; INTRAVENOUS at 13:54

## 2022-10-28 RX ADMIN — VANCOMYCIN HYDROCHLORIDE 1250 MG: 10 INJECTION, POWDER, LYOPHILIZED, FOR SOLUTION INTRAVENOUS at 10:52

## 2022-10-28 RX ADMIN — SODIUM CHLORIDE: 9 INJECTION, SOLUTION INTRAVENOUS at 11:25

## 2022-10-28 RX ADMIN — PROTAMINE SULFATE 50 MG: 10 INJECTION, SOLUTION INTRAVENOUS at 16:00

## 2022-10-28 RX ADMIN — ETOMIDATE 20 MG: 2 INJECTION, SOLUTION INTRAVENOUS at 11:35

## 2022-10-28 RX ADMIN — LIDOCAINE HYDROCHLORIDE 20 MG: 20 INJECTION, SOLUTION INFILTRATION; PERINEURAL at 11:35

## 2022-10-28 RX ADMIN — ATORVASTATIN CALCIUM 20 MG: 20 TABLET, FILM COATED ORAL at 01:25

## 2022-10-28 RX ADMIN — MAGNESIUM SULFATE HEPTAHYDRATE 4 G: 40 INJECTION, SOLUTION INTRAVENOUS at 18:09

## 2022-10-28 RX ADMIN — SODIUM CHLORIDE, POTASSIUM CHLORIDE, SODIUM LACTATE AND CALCIUM CHLORIDE 250 ML: 600; 310; 30; 20 INJECTION, SOLUTION INTRAVENOUS at 20:40

## 2022-10-28 RX ADMIN — ALBUMIN (HUMAN) 25 G: 0.25 INJECTION, SOLUTION INTRAVENOUS at 20:40

## 2022-10-28 RX ADMIN — SODIUM BICARBONATE 50 MEQ: 42 INJECTION, SOLUTION INTRAVENOUS at 14:43

## 2022-10-28 RX ADMIN — SULFUR HEXAFLUORIDE 2 ML: KIT at 09:15

## 2022-10-28 RX ADMIN — METOPROLOL TARTRATE 50 MG: 50 TABLET, FILM COATED ORAL at 01:25

## 2022-10-28 RX ADMIN — DOPAMINE HYDROCHLORIDE 10 MCG/KG/MIN: 80 INJECTION, SOLUTION INTRAVENOUS at 14:17

## 2022-10-28 RX ADMIN — FENTANYL CITRATE 250 MCG: 50 INJECTION, SOLUTION INTRAMUSCULAR; INTRAVENOUS at 12:20

## 2022-10-28 RX ADMIN — ESMOLOL HYDROCHLORIDE 25 MG: 10 INJECTION, SOLUTION INTRAVENOUS at 11:35

## 2022-10-28 RX ADMIN — Medication 10 ML: at 00:00

## 2022-10-28 RX ADMIN — HEPARIN SODIUM 10000 UNITS: 1000 INJECTION, SOLUTION INTRAVENOUS; SUBCUTANEOUS at 12:59

## 2022-10-28 RX ADMIN — SODIUM CHLORIDE 4.7 UNITS/HR: 900 INJECTION INTRAVENOUS at 12:00

## 2022-10-28 RX ADMIN — PROPOFOL 25 MCG/KG/MIN: 10 INJECTION, EMULSION INTRAVENOUS at 15:00

## 2022-10-28 RX ADMIN — CHLORHEXIDINE GLUCONATE 15 ML: 1.2 SOLUTION ORAL at 05:55

## 2022-10-28 RX ADMIN — SODIUM CHLORIDE, POTASSIUM CHLORIDE, SODIUM LACTATE AND CALCIUM CHLORIDE 9 ML/HR: 600; 310; 30; 20 INJECTION, SOLUTION INTRAVENOUS at 10:44

## 2022-10-28 RX ADMIN — CHLORHEXIDINE GLUCONATE 0.12% ORAL RINSE 15 ML: 1.2 LIQUID ORAL at 21:44

## 2022-10-28 RX ADMIN — AMINOCAPROIC ACID 10 G: 250 INJECTION, SOLUTION INTRAVENOUS at 11:50

## 2022-10-28 RX ADMIN — GLYCOPYRROLATE 0.4 MG: 0.2 INJECTION INTRAMUSCULAR; INTRAVENOUS at 13:57

## 2022-10-28 RX ADMIN — VECURONIUM BROMIDE 8 MG: 1 INJECTION, POWDER, LYOPHILIZED, FOR SOLUTION INTRAVENOUS at 13:01

## 2022-10-28 RX ADMIN — SODIUM CHLORIDE: 9 INJECTION, SOLUTION INTRAVENOUS at 11:46

## 2022-10-28 RX ADMIN — Medication 10 ML: at 21:30

## 2022-10-28 RX ADMIN — MUPIROCIN 1 APPLICATION: 20 OINTMENT TOPICAL at 05:55

## 2022-10-28 RX ADMIN — PROTAMINE SULFATE 450 MG: 10 INJECTION, SOLUTION INTRAVENOUS at 14:26

## 2022-10-28 RX ADMIN — ATORVASTATIN CALCIUM 40 MG: 40 TABLET, FILM COATED ORAL at 21:44

## 2022-10-28 RX ADMIN — NITROGLYCERIN 20 MCG/MIN: 20 INJECTION INTRAVENOUS at 12:30

## 2022-10-28 RX ADMIN — HEPARIN SODIUM 30000 UNITS: 1000 INJECTION, SOLUTION INTRAVENOUS; SUBCUTANEOUS at 12:45

## 2022-10-28 RX ADMIN — CALCIUM CHLORIDE 0.75 G: 100 INJECTION INTRAVENOUS; INTRAVENTRICULAR at 14:26

## 2022-10-28 NOTE — ANESTHESIA PROCEDURE NOTES
Arterial Line      Patient reassessed immediately prior to procedure    Patient location during procedure: pre-op   Line placed for hemodynamic monitoring.  Performed By   Anesthesiologist: Rickey Chi MD   Preanesthetic Checklist  Completed: patient identified, IV checked, site marked, risks and benefits discussed, surgical consent, monitors and equipment checked, pre-op evaluation and timeout performed  Arterial Line Prep    Sterile Tech: cap, gloves and sterile barriers  Prep: ChloraPrep  Patient monitoring: blood pressure monitoring, continuous pulse oximetry and EKG  Arterial Line Procedure   Laterality:right  Location:  radial artery  Catheter size: 20 G   Guidance: ultrasound guided and palpation technique  Number of attempts: 1  Successful placement: yes   Post Assessment   Dressing Type: line sutured, occlusive dressing applied, secured with tape and wrist guard applied.   Complications no  Circ/Move/Sens Assessment: normal and unchanged.   Patient Tolerance: patient tolerated the procedure well with no apparent complications

## 2022-10-28 NOTE — ANESTHESIA PROCEDURE NOTES
Airway  Urgency: elective    Airway not difficult    General Information and Staff    Patient location during procedure: OR  Anesthesiologist: Rickey Chi MD    Indications and Patient Condition  Indications for airway management: airway protection    Preoxygenated: yes  MILS not maintained throughout  Mask difficulty assessment: 1 - vent by mask    Final Airway Details  Final airway type: endotracheal airway      Successful airway: ETT  Cuffed: yes   Successful intubation technique: direct laryngoscopy  Endotracheal tube insertion site: oral  Blade: Dara  Blade size: 3  ETT size (mm): 7.0 (hi-lo)  Cormack-Lehane Classification: grade I - full view of glottis  Placement verified by: chest auscultation and capnometry   Measured from: lips  ETT/EBT  to lips (cm): 20  Number of attempts at approach: 1  Assessment: lips, teeth, and gum same as pre-op and atraumatic intubation    Additional Comments  Negative epigastric sounds, Breath sound equal bilaterally with symmetric chest rise and fall

## 2022-10-28 NOTE — ANESTHESIA PROCEDURE NOTES
Central Line      Patient reassessed immediately prior to procedure    Patient location during procedure: OR  Indications: vascular access  Staff  Anesthesiologist: Rickey Chi MD  Preanesthetic Checklist  Completed: patient identified, IV checked, site marked, risks and benefits discussed, surgical consent, monitors and equipment checked, pre-op evaluation and timeout performed  Central Line Prep  Sterile Tech:cap, gloves, gown, mask and sterile barriers  Prep: chloraprep  Patient monitoring: blood pressure monitoring, continuous pulse oximetry and EKG  Central Line Procedure  Laterality:right  Location:internal jugular  Catheter Type:MAC  Catheter Size:9 Fr  Guidance:ultrasound guided  PROCEDURE NOTE/ULTRASOUND INTERPRETATION.  Using ultrasound guidance the potential vascular sites for insertion of the catheter were visualized to determine the patency of the vessel to be used for vascular access.  After selecting the appropriate site for insertion, the needle was visualized under ultrasound being inserted into the internal jugular vein, followed by ultrasound confirmation of wire and catheter placement. There were no abnormalities seen on ultrasound; an image was taken; and the patient tolerated the procedure with no complications. Images: still images obtained, printed/placed on chart  Assessment  Post procedure:biopatch applied, line sutured, occlusive dressing applied and secured with tape  Assessement:blood return through all ports, free fluid flow, chest x-ray ordered and Scott Test  Complications:no  Patient Tolerance:patient tolerated the procedure well with no apparent complications

## 2022-10-28 NOTE — ANESTHESIA POSTPROCEDURE EVALUATION
Patient: Shantel Ríos    Procedure Summary     Date: 10/28/22 Room / Location:  [facility]    Anesthesia Start: 1125 Anesthesia Stop: 1531    Procedures:       MEDIAN STERNOTOMY, CORONARY ARTERY BYPASS GRAFTING X 3, UTILIZING THE LEFT INTERNAL MAMMARY ARTERY (Chest)      ENDOSCOPIC VEIN HARVEST OF THE RIGHT GREATER SAPHENOUS VEIN Diagnosis:       Coronary artery disease involving native coronary artery of native heart without angina pectoris      (Coronary artery disease involving native coronary artery of native heart without angina pectoris [I25.10])    Surgeons: Bayron Diaz MD Provider: Rickey Chi MD    Anesthesia Type: general ASA Status: 4          Anesthesia Type: general    Vitals  Vitals Value Taken Time   /67 10/28/22 1527   Temp     Pulse 65 10/28/22 1530   Resp     SpO2 98 % 10/28/22 1530   Vitals shown include unvalidated device data.        Post Anesthesia Care and Evaluation    Patient location during evaluation: ICU  Patient participation: complete - patient cannot participate  Level of consciousness: obtunded/minimal responses    Airway patency: patent  Anesthetic complications: No anesthetic complications    Cardiovascular status: hemodynamically stable  Respiratory status: ETT and ventilator  Hydration status: euvolemic    Comments: To ICU on O2/ambu/monitor; VSS; report to RN

## 2022-10-28 NOTE — ANESTHESIA PREPROCEDURE EVALUATION
Anesthesia Evaluation     Patient summary reviewed and Nursing notes reviewed                Airway   Mallampati: II  TM distance: >3 FB  Neck ROM: full  No difficulty expected  Dental - normal exam     Pulmonary - normal exam   (+) a smoker Current,   Cardiovascular - normal exam    (+) hypertension, CAD, cardiac stents hyperlipidemia,       Neuro/Psych- negative ROS  GI/Hepatic/Renal/Endo    (+) obesity,  GERD,  diabetes mellitus poorly controlled,     Musculoskeletal (-) negative ROS    Abdominal  - normal exam    Bowel sounds: normal.   Substance History   (+) drug use (cocaine, methamphetamine )     OB/GYN negative ob/gyn ROS         Other                      Anesthesia Plan    ASA 4     general     (A line, CVL, ICU/vent)  intravenous induction     Anesthetic plan, risks, benefits, and alternatives have been provided, discussed and informed consent has been obtained with: patient.        CODE STATUS:    Code Status (Patient has no pulse and is not breathing): CPR (Attempt to Resuscitate)  Medical Interventions (Patient has pulse or is breathing): Full Support  Release to patient: Routine Release

## 2022-10-29 ENCOUNTER — APPOINTMENT (OUTPATIENT)
Dept: GENERAL RADIOLOGY | Facility: HOSPITAL | Age: 62
End: 2022-10-29

## 2022-10-29 LAB
ACT BLD: 121 SECONDS (ref 82–152)
ALBUMIN SERPL-MCNC: 3.8 G/DL (ref 3.5–5.2)
ANION GAP SERPL CALCULATED.3IONS-SCNC: 7 MMOL/L (ref 5–15)
ANION GAP SERPL CALCULATED.3IONS-SCNC: 9 MMOL/L (ref 5–15)
ARTERIAL PATENCY WRIST A: ABNORMAL
ATMOSPHERIC PRESS: ABNORMAL MM[HG]
BASE EXCESS BLDA CALC-SCNC: -0.5 MMOL/L (ref 0–2)
BASE EXCESS BLDA CALC-SCNC: 0.4 MMOL/L (ref 0–2)
BASE EXCESS BLDA CALC-SCNC: 2.7 MMOL/L (ref 0–2)
BASOPHILS # BLD AUTO: 0.06 10*3/MM3 (ref 0–0.2)
BASOPHILS NFR BLD AUTO: 0.3 % (ref 0–1.5)
BDY SITE: ABNORMAL
BH BB BLOOD EXPIRATION DATE: NORMAL
BH BB BLOOD EXPIRATION DATE: NORMAL
BH BB BLOOD TYPE BARCODE: 5100
BH BB BLOOD TYPE BARCODE: 600
BH BB DISPENSE STATUS: NORMAL
BH BB DISPENSE STATUS: NORMAL
BH BB PRODUCT CODE: NORMAL
BH BB PRODUCT CODE: NORMAL
BH BB UNIT NUMBER: NORMAL
BH BB UNIT NUMBER: NORMAL
BODY TEMPERATURE: 37 C
BUN SERPL-MCNC: 22 MG/DL (ref 8–23)
BUN SERPL-MCNC: 24 MG/DL (ref 8–23)
BUN/CREAT SERPL: 23.7 (ref 7–25)
BUN/CREAT SERPL: 24.5 (ref 7–25)
CALCIUM SPEC-SCNC: 8.1 MG/DL (ref 8.6–10.5)
CALCIUM SPEC-SCNC: 8.1 MG/DL (ref 8.6–10.5)
CHLORIDE SERPL-SCNC: 108 MMOL/L (ref 98–107)
CHLORIDE SERPL-SCNC: 109 MMOL/L (ref 98–107)
CO2 BLDA-SCNC: 26.6 MMOL/L (ref 22–33)
CO2 BLDA-SCNC: 28.6 MMOL/L (ref 22–33)
CO2 BLDA-SCNC: 29.4 MMOL/L (ref 22–33)
CO2 SERPL-SCNC: 25 MMOL/L (ref 22–29)
CO2 SERPL-SCNC: 27 MMOL/L (ref 22–29)
COHGB MFR BLD: 0.7 % (ref 0–2)
COHGB MFR BLD: 0.7 % (ref 0–2)
COHGB MFR BLD: 0.9 % (ref 0–2)
CREAT SERPL-MCNC: 0.93 MG/DL (ref 0.57–1)
CREAT SERPL-MCNC: 0.98 MG/DL (ref 0.57–1)
DEPRECATED RDW RBC AUTO: 48.2 FL (ref 37–54)
EGFRCR SERPLBLD CKD-EPI 2021: 65.8 ML/MIN/1.73
EGFRCR SERPLBLD CKD-EPI 2021: 70.1 ML/MIN/1.73
EOSINOPHIL # BLD AUTO: 0.04 10*3/MM3 (ref 0–0.4)
EOSINOPHIL NFR BLD AUTO: 0.2 % (ref 0.3–6.2)
EPAP: 0
ERYTHROCYTE [DISTWIDTH] IN BLOOD BY AUTOMATED COUNT: 13.7 % (ref 12.3–15.4)
GLUCOSE BLDC GLUCOMTR-MCNC: 111 MG/DL (ref 70–130)
GLUCOSE BLDC GLUCOMTR-MCNC: 118 MG/DL (ref 70–130)
GLUCOSE BLDC GLUCOMTR-MCNC: 121 MG/DL (ref 70–130)
GLUCOSE BLDC GLUCOMTR-MCNC: 138 MG/DL (ref 70–130)
GLUCOSE BLDC GLUCOMTR-MCNC: 142 MG/DL (ref 70–130)
GLUCOSE BLDC GLUCOMTR-MCNC: 144 MG/DL (ref 70–130)
GLUCOSE BLDC GLUCOMTR-MCNC: 153 MG/DL (ref 70–130)
GLUCOSE BLDC GLUCOMTR-MCNC: 156 MG/DL (ref 70–130)
GLUCOSE BLDC GLUCOMTR-MCNC: 160 MG/DL (ref 70–130)
GLUCOSE BLDC GLUCOMTR-MCNC: 163 MG/DL (ref 70–130)
GLUCOSE BLDC GLUCOMTR-MCNC: 164 MG/DL (ref 70–130)
GLUCOSE BLDC GLUCOMTR-MCNC: 168 MG/DL (ref 70–130)
GLUCOSE BLDC GLUCOMTR-MCNC: 168 MG/DL (ref 70–130)
GLUCOSE BLDC GLUCOMTR-MCNC: 170 MG/DL (ref 70–130)
GLUCOSE BLDC GLUCOMTR-MCNC: 171 MG/DL (ref 70–130)
GLUCOSE BLDC GLUCOMTR-MCNC: 171 MG/DL (ref 70–130)
GLUCOSE BLDC GLUCOMTR-MCNC: 173 MG/DL (ref 70–130)
GLUCOSE BLDC GLUCOMTR-MCNC: 177 MG/DL (ref 70–130)
GLUCOSE BLDC GLUCOMTR-MCNC: 183 MG/DL (ref 70–130)
GLUCOSE BLDC GLUCOMTR-MCNC: 184 MG/DL (ref 70–130)
GLUCOSE BLDC GLUCOMTR-MCNC: 191 MG/DL (ref 70–130)
GLUCOSE BLDC GLUCOMTR-MCNC: 230 MG/DL (ref 70–130)
GLUCOSE SERPL-MCNC: 127 MG/DL (ref 65–99)
GLUCOSE SERPL-MCNC: 225 MG/DL (ref 65–99)
HCO3 BLDA-SCNC: 25.2 MMOL/L (ref 20–26)
HCO3 BLDA-SCNC: 27 MMOL/L (ref 20–26)
HCO3 BLDA-SCNC: 28 MMOL/L (ref 20–26)
HCT VFR BLD AUTO: 33.8 % (ref 34–46.6)
HCT VFR BLD CALC: 32.3 % (ref 38–51)
HCT VFR BLD CALC: 34.1 % (ref 38–51)
HCT VFR BLD CALC: 34.1 % (ref 38–51)
HGB BLD-MCNC: 10.8 G/DL (ref 12–15.9)
HGB BLDA-MCNC: 10.6 G/DL (ref 14–18)
HGB BLDA-MCNC: 11.1 G/DL (ref 14–18)
HGB BLDA-MCNC: 11.1 G/DL (ref 14–18)
IMM GRANULOCYTES # BLD AUTO: 0.09 10*3/MM3 (ref 0–0.05)
IMM GRANULOCYTES NFR BLD AUTO: 0.5 % (ref 0–0.5)
INHALED O2 CONCENTRATION: 40 %
INHALED O2 CONCENTRATION: 50 %
INHALED O2 CONCENTRATION: 50 %
INR PPP: 1.35 (ref 0.84–1.13)
IPAP: 0
LYMPHOCYTES # BLD AUTO: 2.95 10*3/MM3 (ref 0.7–3.1)
LYMPHOCYTES NFR BLD AUTO: 16.6 % (ref 19.6–45.3)
MAGNESIUM SERPL-MCNC: 2.3 MG/DL (ref 1.6–2.4)
MAGNESIUM SERPL-MCNC: 2.7 MG/DL (ref 1.6–2.4)
MCH RBC QN AUTO: 30.4 PG (ref 26.6–33)
MCHC RBC AUTO-ENTMCNC: 32 G/DL (ref 31.5–35.7)
MCV RBC AUTO: 95.2 FL (ref 79–97)
METHGB BLD QL: 0.7 % (ref 0–1.5)
METHGB BLD QL: 0.8 % (ref 0–1.5)
METHGB BLD QL: 0.9 % (ref 0–1.5)
MODALITY: ABNORMAL
MONOCYTES # BLD AUTO: 0.96 10*3/MM3 (ref 0.1–0.9)
MONOCYTES NFR BLD AUTO: 5.4 % (ref 5–12)
NEUTROPHILS NFR BLD AUTO: 13.72 10*3/MM3 (ref 1.7–7)
NEUTROPHILS NFR BLD AUTO: 77 % (ref 42.7–76)
NOTE: ABNORMAL
NRBC BLD AUTO-RTO: 0 /100 WBC (ref 0–0.2)
OXYHGB MFR BLDV: 90.6 % (ref 94–99)
OXYHGB MFR BLDV: 94.1 % (ref 94–99)
OXYHGB MFR BLDV: 95 % (ref 94–99)
PAW @ PEAK INSP FLOW SETTING VENT: 0 CMH2O
PCO2 BLDA: 45 MM HG (ref 35–45)
PCO2 BLDA: 45.3 MM HG (ref 35–45)
PCO2 BLDA: 51.8 MM HG (ref 35–45)
PCO2 TEMP ADJ BLD: 45 MM HG (ref 35–45)
PCO2 TEMP ADJ BLD: 45.3 MM HG (ref 35–45)
PCO2 TEMP ADJ BLD: 51.8 MM HG (ref 35–45)
PEEP RESPIRATORY: 5 CM[H2O]
PEEP RESPIRATORY: 5 CM[H2O]
PH BLDA: 7.33 PH UNITS (ref 7.35–7.45)
PH BLDA: 7.36 PH UNITS (ref 7.35–7.45)
PH BLDA: 7.4 PH UNITS (ref 7.35–7.45)
PH, TEMP CORRECTED: 7.33 PH UNITS
PH, TEMP CORRECTED: 7.36 PH UNITS
PH, TEMP CORRECTED: 7.4 PH UNITS
PHOSPHATE SERPL-MCNC: 3.4 MG/DL (ref 2.5–4.5)
PHOSPHATE SERPL-MCNC: 4.7 MG/DL (ref 2.5–4.5)
PLATELET # BLD AUTO: 237 10*3/MM3 (ref 140–450)
PMV BLD AUTO: 11 FL (ref 6–12)
PO2 BLDA: 62.7 MM HG (ref 83–108)
PO2 BLDA: 83.8 MM HG (ref 83–108)
PO2 BLDA: 85 MM HG (ref 83–108)
PO2 TEMP ADJ BLD: 62.7 MM HG (ref 83–108)
PO2 TEMP ADJ BLD: 83.8 MM HG (ref 83–108)
PO2 TEMP ADJ BLD: 85 MM HG (ref 83–108)
POTASSIUM SERPL-SCNC: 3.9 MMOL/L (ref 3.5–5.2)
POTASSIUM SERPL-SCNC: 4.6 MMOL/L (ref 3.5–5.2)
PROTHROMBIN TIME: 16.6 SECONDS (ref 11.4–14.4)
PSV: 10 CMH2O
PSV: 10 CMH2O
RBC # BLD AUTO: 3.55 10*6/MM3 (ref 3.77–5.28)
SODIUM SERPL-SCNC: 142 MMOL/L (ref 136–145)
SODIUM SERPL-SCNC: 143 MMOL/L (ref 136–145)
TOTAL RATE: 0 BREATHS/MINUTE
TOTAL RATE: 16 BREATHS/MINUTE
TOTAL RATE: 18 BREATHS/MINUTE
UNIT  ABO: NORMAL
UNIT  ABO: NORMAL
UNIT  RH: NORMAL
UNIT  RH: NORMAL
VENTILATOR MODE: ABNORMAL
VENTILATOR MODE: ABNORMAL
VT ON VENT VENT: 0.42 ML
VT ON VENT VENT: 0.42 ML
WBC NRBC COR # BLD: 17.82 10*3/MM3 (ref 3.4–10.8)

## 2022-10-29 PROCEDURE — 83735 ASSAY OF MAGNESIUM: CPT | Performed by: THORACIC SURGERY (CARDIOTHORACIC VASCULAR SURGERY)

## 2022-10-29 PROCEDURE — 85610 PROTHROMBIN TIME: CPT | Performed by: PHYSICIAN ASSISTANT

## 2022-10-29 PROCEDURE — 94799 UNLISTED PULMONARY SVC/PX: CPT

## 2022-10-29 PROCEDURE — 85347 COAGULATION TIME ACTIVATED: CPT

## 2022-10-29 PROCEDURE — 80048 BASIC METABOLIC PNL TOTAL CA: CPT | Performed by: THORACIC SURGERY (CARDIOTHORACIC VASCULAR SURGERY)

## 2022-10-29 PROCEDURE — 82962 GLUCOSE BLOOD TEST: CPT

## 2022-10-29 PROCEDURE — 94761 N-INVAS EAR/PLS OXIMETRY MLT: CPT

## 2022-10-29 PROCEDURE — 82805 BLOOD GASES W/O2 SATURATION: CPT

## 2022-10-29 PROCEDURE — 83735 ASSAY OF MAGNESIUM: CPT | Performed by: PHYSICIAN ASSISTANT

## 2022-10-29 PROCEDURE — 99233 SBSQ HOSP IP/OBS HIGH 50: CPT | Performed by: INTERNAL MEDICINE

## 2022-10-29 PROCEDURE — 99024 POSTOP FOLLOW-UP VISIT: CPT | Performed by: THORACIC SURGERY (CARDIOTHORACIC VASCULAR SURGERY)

## 2022-10-29 PROCEDURE — 25010000002 PROPOFOL 10 MG/ML EMULSION: Performed by: PHYSICIAN ASSISTANT

## 2022-10-29 PROCEDURE — P9047 ALBUMIN (HUMAN), 25%, 50ML: HCPCS | Performed by: THORACIC SURGERY (CARDIOTHORACIC VASCULAR SURGERY)

## 2022-10-29 PROCEDURE — 93010 ELECTROCARDIOGRAM REPORT: CPT | Performed by: INTERNAL MEDICINE

## 2022-10-29 PROCEDURE — 25010000002 ALBUMIN HUMAN 25% PER 50 ML: Performed by: PHYSICIAN ASSISTANT

## 2022-10-29 PROCEDURE — 85025 COMPLETE CBC W/AUTO DIFF WBC: CPT | Performed by: PHYSICIAN ASSISTANT

## 2022-10-29 PROCEDURE — 93005 ELECTROCARDIOGRAM TRACING: CPT | Performed by: PHYSICIAN ASSISTANT

## 2022-10-29 PROCEDURE — 25010000002 ALBUMIN HUMAN 25% PER 50 ML: Performed by: THORACIC SURGERY (CARDIOTHORACIC VASCULAR SURGERY)

## 2022-10-29 PROCEDURE — 82375 ASSAY CARBOXYHB QUANT: CPT

## 2022-10-29 PROCEDURE — 94003 VENT MGMT INPAT SUBQ DAY: CPT

## 2022-10-29 PROCEDURE — 84100 ASSAY OF PHOSPHORUS: CPT | Performed by: THORACIC SURGERY (CARDIOTHORACIC VASCULAR SURGERY)

## 2022-10-29 PROCEDURE — 94640 AIRWAY INHALATION TREATMENT: CPT

## 2022-10-29 PROCEDURE — 25010000002 MORPHINE PER 10 MG: Performed by: THORACIC SURGERY (CARDIOTHORACIC VASCULAR SURGERY)

## 2022-10-29 PROCEDURE — 71045 X-RAY EXAM CHEST 1 VIEW: CPT

## 2022-10-29 PROCEDURE — 80069 RENAL FUNCTION PANEL: CPT | Performed by: THORACIC SURGERY (CARDIOTHORACIC VASCULAR SURGERY)

## 2022-10-29 PROCEDURE — 25010000002 VANCOMYCIN 10 G RECONSTITUTED SOLUTION: Performed by: THORACIC SURGERY (CARDIOTHORACIC VASCULAR SURGERY)

## 2022-10-29 PROCEDURE — 25010000002 FENTANYL CITRATE (PF) 50 MCG/ML SOLUTION: Performed by: THORACIC SURGERY (CARDIOTHORACIC VASCULAR SURGERY)

## 2022-10-29 PROCEDURE — 83050 HGB METHEMOGLOBIN QUAN: CPT

## 2022-10-29 PROCEDURE — P9047 ALBUMIN (HUMAN), 25%, 50ML: HCPCS | Performed by: PHYSICIAN ASSISTANT

## 2022-10-29 PROCEDURE — 36600 WITHDRAWAL OF ARTERIAL BLOOD: CPT

## 2022-10-29 RX ORDER — IPRATROPIUM BROMIDE AND ALBUTEROL SULFATE 2.5; .5 MG/3ML; MG/3ML
3 SOLUTION RESPIRATORY (INHALATION)
Status: DISCONTINUED | OUTPATIENT
Start: 2022-10-29 | End: 2022-11-05

## 2022-10-29 RX ORDER — ALBUMIN (HUMAN) 12.5 G/50ML
25 SOLUTION INTRAVENOUS AS NEEDED
Status: DISCONTINUED | OUTPATIENT
Start: 2022-10-29 | End: 2022-10-30

## 2022-10-29 RX ORDER — PANTOPRAZOLE SODIUM 40 MG/1
40 TABLET, DELAYED RELEASE ORAL
Status: DISCONTINUED | OUTPATIENT
Start: 2022-10-30 | End: 2022-10-30

## 2022-10-29 RX ORDER — ASPIRIN 325 MG
325 TABLET ORAL ONCE
Status: COMPLETED | OUTPATIENT
Start: 2022-10-29 | End: 2022-10-29

## 2022-10-29 RX ADMIN — CHLORHEXIDINE GLUCONATE 0.12% ORAL RINSE 15 ML: 1.2 LIQUID ORAL at 08:40

## 2022-10-29 RX ADMIN — DEXMEDETOMIDINE HYDROCHLORIDE 0.8 MCG/KG/HR: 4 INJECTION, SOLUTION INTRAVENOUS at 05:34

## 2022-10-29 RX ADMIN — SENNOSIDES AND DOCUSATE SODIUM 2 TABLET: 50; 8.6 TABLET ORAL at 08:40

## 2022-10-29 RX ADMIN — GABAPENTIN 100 MG: 100 CAPSULE ORAL at 00:01

## 2022-10-29 RX ADMIN — OXYCODONE 10 MG: 5 TABLET ORAL at 14:37

## 2022-10-29 RX ADMIN — GABAPENTIN 100 MG: 100 CAPSULE ORAL at 16:14

## 2022-10-29 RX ADMIN — ACETAMINOPHEN 650 MG: 325 TABLET, FILM COATED ORAL at 16:14

## 2022-10-29 RX ADMIN — OXYCODONE 10 MG: 5 TABLET ORAL at 11:11

## 2022-10-29 RX ADMIN — Medication 10 ML: at 20:04

## 2022-10-29 RX ADMIN — FENTANYL CITRATE 25 MCG: 50 INJECTION, SOLUTION INTRAMUSCULAR; INTRAVENOUS at 01:04

## 2022-10-29 RX ADMIN — INSULIN HUMAN 1.1 UNITS/HR: 1 INJECTION, SOLUTION INTRAVENOUS at 18:29

## 2022-10-29 RX ADMIN — OXYCODONE 10 MG: 5 TABLET ORAL at 18:20

## 2022-10-29 RX ADMIN — PROPOFOL 40 MCG/KG/MIN: 10 INJECTION, EMULSION INTRAVENOUS at 07:47

## 2022-10-29 RX ADMIN — Medication 0.09 MCG/KG/MIN: at 07:49

## 2022-10-29 RX ADMIN — OXYCODONE 10 MG: 5 TABLET ORAL at 00:01

## 2022-10-29 RX ADMIN — CHLORHEXIDINE GLUCONATE 0.12% ORAL RINSE 15 ML: 1.2 LIQUID ORAL at 20:04

## 2022-10-29 RX ADMIN — SODIUM CHLORIDE, POTASSIUM CHLORIDE, SODIUM LACTATE AND CALCIUM CHLORIDE 250 ML: 600; 310; 30; 20 INJECTION, SOLUTION INTRAVENOUS at 08:04

## 2022-10-29 RX ADMIN — ACETAMINOPHEN 650 MG: 325 TABLET, FILM COATED ORAL at 08:40

## 2022-10-29 RX ADMIN — ESCITALOPRAM OXALATE 10 MG: 10 TABLET ORAL at 08:40

## 2022-10-29 RX ADMIN — IPRATROPIUM BROMIDE AND ALBUTEROL SULFATE 3 ML: .5; 3 SOLUTION RESPIRATORY (INHALATION) at 15:20

## 2022-10-29 RX ADMIN — ATORVASTATIN CALCIUM 40 MG: 40 TABLET, FILM COATED ORAL at 20:04

## 2022-10-29 RX ADMIN — DEXMEDETOMIDINE HYDROCHLORIDE 0.6 MCG/KG/HR: 4 INJECTION, SOLUTION INTRAVENOUS at 18:09

## 2022-10-29 RX ADMIN — ALBUMIN (HUMAN) 25 G: 0.25 INJECTION, SOLUTION INTRAVENOUS at 08:04

## 2022-10-29 RX ADMIN — GABAPENTIN 100 MG: 100 CAPSULE ORAL at 08:40

## 2022-10-29 RX ADMIN — IPRATROPIUM BROMIDE AND ALBUTEROL SULFATE 3 ML: .5; 3 SOLUTION RESPIRATORY (INHALATION) at 13:19

## 2022-10-29 RX ADMIN — SENNOSIDES AND DOCUSATE SODIUM 2 TABLET: 50; 8.6 TABLET ORAL at 20:04

## 2022-10-29 RX ADMIN — OXYCODONE 10 MG: 5 TABLET ORAL at 22:23

## 2022-10-29 RX ADMIN — PROPOFOL 50 MCG/KG/MIN: 10 INJECTION, EMULSION INTRAVENOUS at 02:31

## 2022-10-29 RX ADMIN — DEXMEDETOMIDINE HYDROCHLORIDE 0.8 MCG/KG/HR: 4 INJECTION, SOLUTION INTRAVENOUS at 11:09

## 2022-10-29 RX ADMIN — MORPHINE SULFATE 2 MG: 2 INJECTION, SOLUTION INTRAMUSCULAR; INTRAVENOUS at 20:45

## 2022-10-29 RX ADMIN — IPRATROPIUM BROMIDE AND ALBUTEROL SULFATE 3 ML: .5; 3 SOLUTION RESPIRATORY (INHALATION) at 19:53

## 2022-10-29 RX ADMIN — ASPIRIN 325 MG: 325 TABLET ORAL at 09:03

## 2022-10-29 RX ADMIN — ALBUMIN (HUMAN) 25 G: 0.25 INJECTION, SOLUTION INTRAVENOUS at 01:05

## 2022-10-29 RX ADMIN — SODIUM CHLORIDE, POTASSIUM CHLORIDE, SODIUM LACTATE AND CALCIUM CHLORIDE 250 ML: 600; 310; 30; 20 INJECTION, SOLUTION INTRAVENOUS at 01:05

## 2022-10-29 RX ADMIN — ACETAMINOPHEN 650 MG: 325 TABLET, FILM COATED ORAL at 00:01

## 2022-10-29 RX ADMIN — VANCOMYCIN HYDROCHLORIDE 1250 MG: 10 INJECTION, POWDER, LYOPHILIZED, FOR SOLUTION INTRAVENOUS at 22:23

## 2022-10-30 ENCOUNTER — ANCILLARY PROCEDURE (OUTPATIENT)
Dept: SPEECH THERAPY | Facility: HOSPITAL | Age: 62
End: 2022-10-30

## 2022-10-30 ENCOUNTER — APPOINTMENT (OUTPATIENT)
Dept: GENERAL RADIOLOGY | Facility: HOSPITAL | Age: 62
End: 2022-10-30

## 2022-10-30 LAB
ANION GAP SERPL CALCULATED.3IONS-SCNC: 7 MMOL/L (ref 5–15)
BUN SERPL-MCNC: 22 MG/DL (ref 8–23)
BUN/CREAT SERPL: 27.2 (ref 7–25)
CA-I SERPL ISE-MCNC: 1.25 MMOL/L (ref 1.12–1.32)
CALCIUM SPEC-SCNC: 7.7 MG/DL (ref 8.6–10.5)
CHLORIDE SERPL-SCNC: 107 MMOL/L (ref 98–107)
CO2 SERPL-SCNC: 27 MMOL/L (ref 22–29)
CREAT SERPL-MCNC: 0.81 MG/DL (ref 0.57–1)
DEPRECATED RDW RBC AUTO: 47.8 FL (ref 37–54)
EGFRCR SERPLBLD CKD-EPI 2021: 82.7 ML/MIN/1.73
ERYTHROCYTE [DISTWIDTH] IN BLOOD BY AUTOMATED COUNT: 13.4 % (ref 12.3–15.4)
GLUCOSE BLDC GLUCOMTR-MCNC: 141 MG/DL (ref 70–130)
GLUCOSE BLDC GLUCOMTR-MCNC: 147 MG/DL (ref 70–130)
GLUCOSE BLDC GLUCOMTR-MCNC: 150 MG/DL (ref 70–130)
GLUCOSE BLDC GLUCOMTR-MCNC: 151 MG/DL (ref 70–130)
GLUCOSE BLDC GLUCOMTR-MCNC: 151 MG/DL (ref 70–130)
GLUCOSE BLDC GLUCOMTR-MCNC: 156 MG/DL (ref 70–130)
GLUCOSE BLDC GLUCOMTR-MCNC: 167 MG/DL (ref 70–130)
GLUCOSE BLDC GLUCOMTR-MCNC: 171 MG/DL (ref 70–130)
GLUCOSE BLDC GLUCOMTR-MCNC: 175 MG/DL (ref 70–130)
GLUCOSE SERPL-MCNC: 158 MG/DL (ref 65–99)
HCT VFR BLD AUTO: 31.5 % (ref 34–46.6)
HGB BLD-MCNC: 10 G/DL (ref 12–15.9)
MAGNESIUM SERPL-MCNC: 2.1 MG/DL (ref 1.6–2.4)
MCH RBC QN AUTO: 30.5 PG (ref 26.6–33)
MCHC RBC AUTO-ENTMCNC: 31.7 G/DL (ref 31.5–35.7)
MCV RBC AUTO: 96 FL (ref 79–97)
PHOSPHATE SERPL-MCNC: 3.9 MG/DL (ref 2.5–4.5)
PLATELET # BLD AUTO: 152 10*3/MM3 (ref 140–450)
PMV BLD AUTO: 11 FL (ref 6–12)
POTASSIUM SERPL-SCNC: 4.6 MMOL/L (ref 3.5–5.2)
QT INTERVAL: 380 MS
QT INTERVAL: 414 MS
QT INTERVAL: 480 MS
QTC INTERVAL: 388 MS
QTC INTERVAL: 443 MS
QTC INTERVAL: 510 MS
RBC # BLD AUTO: 3.28 10*6/MM3 (ref 3.77–5.28)
SODIUM SERPL-SCNC: 141 MMOL/L (ref 136–145)
WBC NRBC COR # BLD: 12.1 10*3/MM3 (ref 3.4–10.8)

## 2022-10-30 PROCEDURE — 84100 ASSAY OF PHOSPHORUS: CPT | Performed by: INTERNAL MEDICINE

## 2022-10-30 PROCEDURE — 92612 ENDOSCOPY SWALLOW (FEES) VID: CPT | Performed by: SPEECH-LANGUAGE PATHOLOGIST

## 2022-10-30 PROCEDURE — 99024 POSTOP FOLLOW-UP VISIT: CPT | Performed by: THORACIC SURGERY (CARDIOTHORACIC VASCULAR SURGERY)

## 2022-10-30 PROCEDURE — 63710000001 INSULIN DETEMIR PER 5 UNITS: Performed by: INTERNAL MEDICINE

## 2022-10-30 PROCEDURE — 99232 SBSQ HOSP IP/OBS MODERATE 35: CPT | Performed by: INTERNAL MEDICINE

## 2022-10-30 PROCEDURE — 63710000001 INSULIN LISPRO (HUMAN) PER 5 UNITS: Performed by: INTERNAL MEDICINE

## 2022-10-30 PROCEDURE — 93010 ELECTROCARDIOGRAM REPORT: CPT | Performed by: INTERNAL MEDICINE

## 2022-10-30 PROCEDURE — 94799 UNLISTED PULMONARY SVC/PX: CPT

## 2022-10-30 PROCEDURE — 82330 ASSAY OF CALCIUM: CPT | Performed by: THORACIC SURGERY (CARDIOTHORACIC VASCULAR SURGERY)

## 2022-10-30 PROCEDURE — 83735 ASSAY OF MAGNESIUM: CPT | Performed by: INTERNAL MEDICINE

## 2022-10-30 PROCEDURE — 92610 EVALUATE SWALLOWING FUNCTION: CPT | Performed by: SPEECH-LANGUAGE PATHOLOGIST

## 2022-10-30 PROCEDURE — 82962 GLUCOSE BLOOD TEST: CPT

## 2022-10-30 PROCEDURE — 94761 N-INVAS EAR/PLS OXIMETRY MLT: CPT

## 2022-10-30 PROCEDURE — 25010000002 FUROSEMIDE PER 20 MG: Performed by: THORACIC SURGERY (CARDIOTHORACIC VASCULAR SURGERY)

## 2022-10-30 PROCEDURE — 85027 COMPLETE CBC AUTOMATED: CPT | Performed by: PHYSICIAN ASSISTANT

## 2022-10-30 PROCEDURE — 80048 BASIC METABOLIC PNL TOTAL CA: CPT | Performed by: PHYSICIAN ASSISTANT

## 2022-10-30 PROCEDURE — 93005 ELECTROCARDIOGRAM TRACING: CPT | Performed by: PHYSICIAN ASSISTANT

## 2022-10-30 PROCEDURE — 71045 X-RAY EXAM CHEST 1 VIEW: CPT

## 2022-10-30 PROCEDURE — 25810000003 DEXTROSE 5 % WITH KCL 20 MEQ 20-5 MEQ/L-% SOLUTION: Performed by: PHYSICIAN ASSISTANT

## 2022-10-30 RX ORDER — CLOPIDOGREL BISULFATE 75 MG/1
150 TABLET ORAL ONCE
Status: COMPLETED | OUTPATIENT
Start: 2022-10-30 | End: 2022-10-30

## 2022-10-30 RX ORDER — INSULIN LISPRO 100 [IU]/ML
0-7 INJECTION, SOLUTION INTRAVENOUS; SUBCUTANEOUS EVERY 6 HOURS
Status: DISCONTINUED | OUTPATIENT
Start: 2022-10-30 | End: 2022-10-31

## 2022-10-30 RX ORDER — GABAPENTIN 300 MG/1
300 CAPSULE ORAL EVERY 8 HOURS
Status: DISCONTINUED | OUTPATIENT
Start: 2022-10-30 | End: 2022-11-01

## 2022-10-30 RX ORDER — DOCUSATE SODIUM 50 MG/5 ML
100 LIQUID (ML) ORAL 2 TIMES DAILY
Status: DISCONTINUED | OUTPATIENT
Start: 2022-10-30 | End: 2022-11-01

## 2022-10-30 RX ORDER — CLOPIDOGREL BISULFATE 75 MG/1
75 TABLET ORAL DAILY
Status: DISCONTINUED | OUTPATIENT
Start: 2022-10-31 | End: 2022-11-05 | Stop reason: HOSPADM

## 2022-10-30 RX ORDER — NICOTINE POLACRILEX 4 MG
15 LOZENGE BUCCAL
Status: DISCONTINUED | OUTPATIENT
Start: 2022-10-30 | End: 2022-11-05 | Stop reason: HOSPADM

## 2022-10-30 RX ORDER — DEXTROSE MONOHYDRATE 25 G/50ML
25 INJECTION, SOLUTION INTRAVENOUS
Status: DISCONTINUED | OUTPATIENT
Start: 2022-10-30 | End: 2022-11-05 | Stop reason: HOSPADM

## 2022-10-30 RX ORDER — FUROSEMIDE 10 MG/ML
80 INJECTION INTRAMUSCULAR; INTRAVENOUS ONCE
Status: COMPLETED | OUTPATIENT
Start: 2022-10-30 | End: 2022-10-30

## 2022-10-30 RX ORDER — ASPIRIN 81 MG/1
324 TABLET, CHEWABLE ORAL DAILY
Status: DISCONTINUED | OUTPATIENT
Start: 2022-10-30 | End: 2022-11-01

## 2022-10-30 RX ORDER — PANTOPRAZOLE SODIUM 40 MG/10ML
40 INJECTION, POWDER, LYOPHILIZED, FOR SOLUTION INTRAVENOUS
Status: DISCONTINUED | OUTPATIENT
Start: 2022-10-30 | End: 2022-11-05 | Stop reason: HOSPADM

## 2022-10-30 RX ORDER — GUAIFENESIN 200 MG/10ML
200 LIQUID ORAL EVERY 4 HOURS PRN
Status: DISCONTINUED | OUTPATIENT
Start: 2022-10-30 | End: 2022-11-05 | Stop reason: HOSPADM

## 2022-10-30 RX ADMIN — DEXMEDETOMIDINE HYDROCHLORIDE 0.2 MCG/KG/HR: 4 INJECTION, SOLUTION INTRAVENOUS at 12:01

## 2022-10-30 RX ADMIN — INSULIN LISPRO 2 UNITS: 100 INJECTION, SOLUTION INTRAVENOUS; SUBCUTANEOUS at 11:42

## 2022-10-30 RX ADMIN — OXYCODONE 10 MG: 5 TABLET ORAL at 03:20

## 2022-10-30 RX ADMIN — ACETAMINOPHEN 650 MG: 325 TABLET, FILM COATED ORAL at 17:03

## 2022-10-30 RX ADMIN — INSULIN LISPRO 2 UNITS: 100 INJECTION, SOLUTION INTRAVENOUS; SUBCUTANEOUS at 17:03

## 2022-10-30 RX ADMIN — IPRATROPIUM BROMIDE AND ALBUTEROL SULFATE 3 ML: .5; 3 SOLUTION RESPIRATORY (INHALATION) at 08:18

## 2022-10-30 RX ADMIN — GUAIFENESIN 200 MG: 100 SOLUTION ORAL at 09:52

## 2022-10-30 RX ADMIN — CLOPIDOGREL BISULFATE 150 MG: 75 TABLET ORAL at 09:48

## 2022-10-30 RX ADMIN — Medication 10 ML: at 09:53

## 2022-10-30 RX ADMIN — Medication 0.1 MCG/KG/MIN: at 00:37

## 2022-10-30 RX ADMIN — CHLORHEXIDINE GLUCONATE 0.12% ORAL RINSE 15 ML: 1.2 LIQUID ORAL at 21:49

## 2022-10-30 RX ADMIN — CHLORHEXIDINE GLUCONATE 0.12% ORAL RINSE 15 ML: 1.2 LIQUID ORAL at 09:47

## 2022-10-30 RX ADMIN — GABAPENTIN 300 MG: 300 CAPSULE ORAL at 17:04

## 2022-10-30 RX ADMIN — DEXMEDETOMIDINE HYDROCHLORIDE 0.5 MCG/KG/HR: 4 INJECTION, SOLUTION INTRAVENOUS at 02:14

## 2022-10-30 RX ADMIN — GABAPENTIN 100 MG: 100 CAPSULE ORAL at 09:48

## 2022-10-30 RX ADMIN — ESCITALOPRAM OXALATE 10 MG: 10 TABLET ORAL at 09:47

## 2022-10-30 RX ADMIN — IPRATROPIUM BROMIDE AND ALBUTEROL SULFATE 3 ML: .5; 3 SOLUTION RESPIRATORY (INHALATION) at 15:58

## 2022-10-30 RX ADMIN — SENNOSIDES AND DOCUSATE SODIUM 2 TABLET: 50; 8.6 TABLET ORAL at 09:51

## 2022-10-30 RX ADMIN — IPRATROPIUM BROMIDE AND ALBUTEROL SULFATE 3 ML: .5; 3 SOLUTION RESPIRATORY (INHALATION) at 10:43

## 2022-10-30 RX ADMIN — ATORVASTATIN CALCIUM 40 MG: 40 TABLET, FILM COATED ORAL at 21:49

## 2022-10-30 RX ADMIN — GABAPENTIN 100 MG: 100 CAPSULE ORAL at 00:30

## 2022-10-30 RX ADMIN — HYDROCODONE BITARTRATE AND ACETAMINOPHEN 1 TABLET: 7.5; 325 TABLET ORAL at 21:49

## 2022-10-30 RX ADMIN — ACETAMINOPHEN 650 MG: 325 TABLET, FILM COATED ORAL at 00:30

## 2022-10-30 RX ADMIN — FUROSEMIDE 80 MG: 10 INJECTION, SOLUTION INTRAMUSCULAR; INTRAVENOUS at 09:47

## 2022-10-30 RX ADMIN — DOCUSATE SODIUM LIQUID 100 MG: 100 LIQUID ORAL at 21:49

## 2022-10-30 RX ADMIN — ACETAMINOPHEN 650 MG: 325 TABLET, FILM COATED ORAL at 09:48

## 2022-10-30 RX ADMIN — POTASSIUM CHLORIDE AND DEXTROSE MONOHYDRATE 30 ML/HR: 150; 5 INJECTION, SOLUTION INTRAVENOUS at 10:00

## 2022-10-30 RX ADMIN — HYDROCODONE BITARTRATE AND ACETAMINOPHEN 1 TABLET: 7.5; 325 TABLET ORAL at 11:42

## 2022-10-30 RX ADMIN — PANTOPRAZOLE SODIUM 40 MG: 40 INJECTION, POWDER, FOR SOLUTION INTRAVENOUS at 10:19

## 2022-10-30 RX ADMIN — ASPIRIN 81 MG CHEWABLE TABLET 324 MG: 81 TABLET CHEWABLE at 10:19

## 2022-10-30 RX ADMIN — IPRATROPIUM BROMIDE AND ALBUTEROL SULFATE 3 ML: .5; 3 SOLUTION RESPIRATORY (INHALATION) at 19:54

## 2022-10-30 RX ADMIN — INSULIN DETEMIR 5 UNITS: 100 INJECTION, SOLUTION SUBCUTANEOUS at 11:42

## 2022-10-30 RX ADMIN — Medication 0.06 MCG/KG/MIN: at 20:10

## 2022-10-31 ENCOUNTER — APPOINTMENT (OUTPATIENT)
Dept: GENERAL RADIOLOGY | Facility: HOSPITAL | Age: 62
End: 2022-10-31

## 2022-10-31 LAB
ACT BLD: 103 SECONDS (ref 82–152)
ACT BLD: 376 SECONDS (ref 82–152)
ACT BLD: 410 SECONDS (ref 82–152)
ACT BLD: 439 SECONDS (ref 82–152)
ACT BLD: 445 SECONDS (ref 82–152)
ACT BLD: 98 SECONDS (ref 82–152)
ANION GAP SERPL CALCULATED.3IONS-SCNC: 7 MMOL/L (ref 5–15)
BASE EXCESS BLDA CALC-SCNC: -7 MMOL/L (ref -5–5)
BASE EXCESS BLDA CALC-SCNC: 1 MMOL/L (ref -5–5)
BASE EXCESS BLDA CALC-SCNC: 2 MMOL/L (ref -5–5)
BASE EXCESS BLDA CALC-SCNC: 2 MMOL/L (ref -5–5)
BASE EXCESS BLDA CALC-SCNC: 5 MMOL/L (ref -5–5)
BUN SERPL-MCNC: 19 MG/DL (ref 8–23)
BUN/CREAT SERPL: 25.3 (ref 7–25)
CA-I BLDA-SCNC: 0.94 MMOL/L (ref 1.2–1.32)
CA-I BLDA-SCNC: 0.98 MMOL/L (ref 1.2–1.32)
CA-I BLDA-SCNC: 1.02 MMOL/L (ref 1.2–1.32)
CA-I BLDA-SCNC: 1.17 MMOL/L (ref 1.2–1.32)
CA-I BLDA-SCNC: 1.26 MMOL/L (ref 1.2–1.32)
CA-I BLDA-SCNC: 1.33 MMOL/L (ref 1.2–1.32)
CA-I BLDA-SCNC: 1.45 MMOL/L (ref 1.2–1.32)
CALCIUM SPEC-SCNC: 7.9 MG/DL (ref 8.6–10.5)
CHLORIDE SERPL-SCNC: 104 MMOL/L (ref 98–107)
CO2 BLDA-SCNC: 22 MMOL/L (ref 24–29)
CO2 BLDA-SCNC: 27 MMOL/L (ref 24–29)
CO2 BLDA-SCNC: 28 MMOL/L (ref 24–29)
CO2 BLDA-SCNC: 28 MMOL/L (ref 24–29)
CO2 BLDA-SCNC: 29 MMOL/L (ref 24–29)
CO2 BLDA-SCNC: 29 MMOL/L (ref 24–29)
CO2 BLDA-SCNC: 31 MMOL/L (ref 24–29)
CO2 SERPL-SCNC: 28 MMOL/L (ref 22–29)
CREAT SERPL-MCNC: 0.75 MG/DL (ref 0.57–1)
DEPRECATED RDW RBC AUTO: 46.5 FL (ref 37–54)
EGFRCR SERPLBLD CKD-EPI 2021: 90.7 ML/MIN/1.73
ERYTHROCYTE [DISTWIDTH] IN BLOOD BY AUTOMATED COUNT: 13.2 % (ref 12.3–15.4)
GLUCOSE BLDC GLUCOMTR-MCNC: 136 MG/DL (ref 70–130)
GLUCOSE BLDC GLUCOMTR-MCNC: 147 MG/DL (ref 70–130)
GLUCOSE BLDC GLUCOMTR-MCNC: 187 MG/DL (ref 70–130)
GLUCOSE BLDC GLUCOMTR-MCNC: 193 MG/DL (ref 70–130)
GLUCOSE BLDC GLUCOMTR-MCNC: 199 MG/DL (ref 70–130)
GLUCOSE BLDC GLUCOMTR-MCNC: 203 MG/DL (ref 70–130)
GLUCOSE BLDC GLUCOMTR-MCNC: 209 MG/DL (ref 70–130)
GLUCOSE BLDC GLUCOMTR-MCNC: 240 MG/DL (ref 70–130)
GLUCOSE BLDC GLUCOMTR-MCNC: 243 MG/DL (ref 70–130)
GLUCOSE BLDC GLUCOMTR-MCNC: 251 MG/DL (ref 70–130)
GLUCOSE BLDC GLUCOMTR-MCNC: 293 MG/DL (ref 70–130)
GLUCOSE BLDC GLUCOMTR-MCNC: 92 MG/DL (ref 70–130)
GLUCOSE SERPL-MCNC: 135 MG/DL (ref 65–99)
HCO3 BLDA-SCNC: 20.2 MMOL/L (ref 22–26)
HCO3 BLDA-SCNC: 25.7 MMOL/L (ref 22–26)
HCO3 BLDA-SCNC: 26.5 MMOL/L (ref 22–26)
HCO3 BLDA-SCNC: 26.7 MMOL/L (ref 22–26)
HCO3 BLDA-SCNC: 27.3 MMOL/L (ref 22–26)
HCO3 BLDA-SCNC: 27.4 MMOL/L (ref 22–26)
HCO3 BLDA-SCNC: 29.6 MMOL/L (ref 22–26)
HCT VFR BLD AUTO: 30.4 % (ref 34–46.6)
HCT VFR BLDA CALC: 26 % (ref 38–51)
HCT VFR BLDA CALC: 26 % (ref 38–51)
HCT VFR BLDA CALC: 28 % (ref 38–51)
HCT VFR BLDA CALC: 31 % (ref 38–51)
HCT VFR BLDA CALC: 33 % (ref 38–51)
HCT VFR BLDA CALC: 36 % (ref 38–51)
HCT VFR BLDA CALC: 41 % (ref 38–51)
HGB BLD-MCNC: 9.6 G/DL (ref 12–15.9)
HGB BLDA-MCNC: 10.5 G/DL (ref 12–17)
HGB BLDA-MCNC: 11.2 G/DL (ref 12–17)
HGB BLDA-MCNC: 12.2 G/DL (ref 12–17)
HGB BLDA-MCNC: 13.9 G/DL (ref 12–17)
HGB BLDA-MCNC: 8.8 G/DL (ref 12–17)
HGB BLDA-MCNC: 8.8 G/DL (ref 12–17)
HGB BLDA-MCNC: 9.5 G/DL (ref 12–17)
MAGNESIUM SERPL-MCNC: 2 MG/DL (ref 1.6–2.4)
MCH RBC QN AUTO: 30.6 PG (ref 26.6–33)
MCHC RBC AUTO-ENTMCNC: 31.6 G/DL (ref 31.5–35.7)
MCV RBC AUTO: 96.8 FL (ref 79–97)
PCO2 BLDA: 42.7 MM HG (ref 35–45)
PCO2 BLDA: 43.9 MM HG (ref 35–45)
PCO2 BLDA: 46.3 MM HG (ref 35–45)
PCO2 BLDA: 46.7 MM HG (ref 35–45)
PCO2 BLDA: 47.2 MM HG (ref 35–45)
PCO2 BLDA: 47.2 MM HG (ref 35–45)
PCO2 BLDA: 54.3 MM HG (ref 35–45)
PH BLDA: 7.24 PH UNITS (ref 7.35–7.6)
PH BLDA: 7.31 PH UNITS (ref 7.35–7.6)
PH BLDA: 7.36 PH UNITS (ref 7.35–7.6)
PH BLDA: 7.37 PH UNITS (ref 7.35–7.6)
PH BLDA: 7.39 PH UNITS (ref 7.35–7.6)
PH BLDA: 7.39 PH UNITS (ref 7.35–7.6)
PH BLDA: 7.41 PH UNITS (ref 7.35–7.6)
PHOSPHATE SERPL-MCNC: 3.2 MG/DL (ref 2.5–4.5)
PLATELET # BLD AUTO: 132 10*3/MM3 (ref 140–450)
PMV BLD AUTO: 11 FL (ref 6–12)
PO2 BLDA: 320 MMHG (ref 80–105)
PO2 BLDA: 365 MMHG (ref 80–105)
PO2 BLDA: 410 MMHG (ref 80–105)
PO2 BLDA: 435 MMHG (ref 80–105)
PO2 BLDA: 46 MMHG (ref 80–105)
PO2 BLDA: 61 MMHG (ref 80–105)
PO2 BLDA: 75 MMHG (ref 80–105)
POTASSIUM BLDA-SCNC: 3.7 MMOL/L (ref 3.5–4.9)
POTASSIUM BLDA-SCNC: 4.3 MMOL/L (ref 3.5–4.9)
POTASSIUM BLDA-SCNC: 4.5 MMOL/L (ref 3.5–4.9)
POTASSIUM BLDA-SCNC: 5.4 MMOL/L (ref 3.5–4.9)
POTASSIUM BLDA-SCNC: 5.6 MMOL/L (ref 3.5–4.9)
POTASSIUM BLDA-SCNC: 6 MMOL/L (ref 3.5–4.9)
POTASSIUM BLDA-SCNC: 6.4 MMOL/L (ref 3.5–4.9)
POTASSIUM SERPL-SCNC: 4.1 MMOL/L (ref 3.5–5.2)
RBC # BLD AUTO: 3.14 10*6/MM3 (ref 3.77–5.28)
SAO2 % BLDA: 100 % (ref 95–98)
SAO2 % BLDA: 79 % (ref 95–98)
SAO2 % BLDA: 90 % (ref 95–98)
SAO2 % BLDA: 92 % (ref 95–98)
SODIUM BLD-SCNC: 138 MMOL/L (ref 138–146)
SODIUM BLD-SCNC: 139 MMOL/L (ref 138–146)
SODIUM BLD-SCNC: 139 MMOL/L (ref 138–146)
SODIUM BLD-SCNC: 140 MMOL/L (ref 138–146)
SODIUM BLD-SCNC: 144 MMOL/L (ref 138–146)
SODIUM SERPL-SCNC: 139 MMOL/L (ref 136–145)
WBC NRBC COR # BLD: 10.04 10*3/MM3 (ref 3.4–10.8)

## 2022-10-31 PROCEDURE — 25010000002 FUROSEMIDE PER 20 MG: Performed by: INTERNAL MEDICINE

## 2022-10-31 PROCEDURE — 94799 UNLISTED PULMONARY SVC/PX: CPT

## 2022-10-31 PROCEDURE — 71045 X-RAY EXAM CHEST 1 VIEW: CPT

## 2022-10-31 PROCEDURE — 94761 N-INVAS EAR/PLS OXIMETRY MLT: CPT

## 2022-10-31 PROCEDURE — 85027 COMPLETE CBC AUTOMATED: CPT | Performed by: PHYSICIAN ASSISTANT

## 2022-10-31 PROCEDURE — 80048 BASIC METABOLIC PNL TOTAL CA: CPT | Performed by: PHYSICIAN ASSISTANT

## 2022-10-31 PROCEDURE — 25010000002 HEPARIN (PORCINE) PER 1000 UNITS: Performed by: INTERNAL MEDICINE

## 2022-10-31 PROCEDURE — 99024 POSTOP FOLLOW-UP VISIT: CPT | Performed by: THORACIC SURGERY (CARDIOTHORACIC VASCULAR SURGERY)

## 2022-10-31 PROCEDURE — 97162 PT EVAL MOD COMPLEX 30 MIN: CPT

## 2022-10-31 PROCEDURE — 82962 GLUCOSE BLOOD TEST: CPT

## 2022-10-31 PROCEDURE — 63710000001 INSULIN DETEMIR PER 5 UNITS: Performed by: INTERNAL MEDICINE

## 2022-10-31 PROCEDURE — 99232 SBSQ HOSP IP/OBS MODERATE 35: CPT | Performed by: INTERNAL MEDICINE

## 2022-10-31 PROCEDURE — 83735 ASSAY OF MAGNESIUM: CPT | Performed by: INTERNAL MEDICINE

## 2022-10-31 PROCEDURE — 63710000001 INSULIN LISPRO (HUMAN) PER 5 UNITS: Performed by: INTERNAL MEDICINE

## 2022-10-31 PROCEDURE — 84100 ASSAY OF PHOSPHORUS: CPT | Performed by: INTERNAL MEDICINE

## 2022-10-31 RX ORDER — INSULIN LISPRO 100 [IU]/ML
2 INJECTION, SOLUTION INTRAVENOUS; SUBCUTANEOUS
Status: DISCONTINUED | OUTPATIENT
Start: 2022-11-01 | End: 2022-11-05 | Stop reason: HOSPADM

## 2022-10-31 RX ORDER — HEPARIN SODIUM 5000 [USP'U]/ML
5000 INJECTION, SOLUTION INTRAVENOUS; SUBCUTANEOUS EVERY 8 HOURS SCHEDULED
Status: DISCONTINUED | OUTPATIENT
Start: 2022-10-31 | End: 2022-11-05 | Stop reason: HOSPADM

## 2022-10-31 RX ORDER — INSULIN LISPRO 100 [IU]/ML
0-7 INJECTION, SOLUTION INTRAVENOUS; SUBCUTANEOUS
Status: DISCONTINUED | OUTPATIENT
Start: 2022-10-31 | End: 2022-10-31

## 2022-10-31 RX ORDER — FUROSEMIDE 10 MG/ML
40 INJECTION INTRAMUSCULAR; INTRAVENOUS ONCE
Status: COMPLETED | OUTPATIENT
Start: 2022-10-31 | End: 2022-10-31

## 2022-10-31 RX ORDER — INSULIN LISPRO 100 [IU]/ML
0-7 INJECTION, SOLUTION INTRAVENOUS; SUBCUTANEOUS
Status: DISCONTINUED | OUTPATIENT
Start: 2022-10-31 | End: 2022-11-05 | Stop reason: HOSPADM

## 2022-10-31 RX ADMIN — IPRATROPIUM BROMIDE AND ALBUTEROL SULFATE 3 ML: .5; 3 SOLUTION RESPIRATORY (INHALATION) at 20:03

## 2022-10-31 RX ADMIN — OXYCODONE 10 MG: 5 TABLET ORAL at 20:57

## 2022-10-31 RX ADMIN — IPRATROPIUM BROMIDE AND ALBUTEROL SULFATE 3 ML: .5; 3 SOLUTION RESPIRATORY (INHALATION) at 07:17

## 2022-10-31 RX ADMIN — SENNOSIDES 10 ML: 8.8 LIQUID ORAL at 20:57

## 2022-10-31 RX ADMIN — HYDROCODONE BITARTRATE AND ACETAMINOPHEN 1 TABLET: 7.5; 325 TABLET ORAL at 21:52

## 2022-10-31 RX ADMIN — ACETAMINOPHEN 650 MG: 325 TABLET, FILM COATED ORAL at 00:26

## 2022-10-31 RX ADMIN — INSULIN DETEMIR 5 UNITS: 100 INJECTION, SOLUTION SUBCUTANEOUS at 10:11

## 2022-10-31 RX ADMIN — ACETAMINOPHEN 650 MG: 325 TABLET, FILM COATED ORAL at 10:13

## 2022-10-31 RX ADMIN — PANTOPRAZOLE SODIUM 40 MG: 40 INJECTION, POWDER, FOR SOLUTION INTRAVENOUS at 06:54

## 2022-10-31 RX ADMIN — ESCITALOPRAM OXALATE 10 MG: 10 TABLET ORAL at 10:13

## 2022-10-31 RX ADMIN — INSULIN LISPRO 3 UNITS: 100 INJECTION, SOLUTION INTRAVENOUS; SUBCUTANEOUS at 12:21

## 2022-10-31 RX ADMIN — FUROSEMIDE 40 MG: 10 INJECTION, SOLUTION INTRAMUSCULAR; INTRAVENOUS at 22:55

## 2022-10-31 RX ADMIN — HEPARIN SODIUM 5000 UNITS: 5000 INJECTION INTRAVENOUS; SUBCUTANEOUS at 20:57

## 2022-10-31 RX ADMIN — HEPARIN SODIUM 5000 UNITS: 5000 INJECTION INTRAVENOUS; SUBCUTANEOUS at 14:56

## 2022-10-31 RX ADMIN — METOPROLOL TARTRATE 12.5 MG: 25 TABLET, FILM COATED ORAL at 10:12

## 2022-10-31 RX ADMIN — SENNOSIDES 10 ML: 8.8 LIQUID ORAL at 10:12

## 2022-10-31 RX ADMIN — GUAIFENESIN 200 MG: 100 SOLUTION ORAL at 21:00

## 2022-10-31 RX ADMIN — DOCUSATE SODIUM LIQUID 100 MG: 100 LIQUID ORAL at 10:11

## 2022-10-31 RX ADMIN — CLOPIDOGREL BISULFATE 75 MG: 75 TABLET ORAL at 10:13

## 2022-10-31 RX ADMIN — INSULIN LISPRO 2 UNITS: 100 INJECTION, SOLUTION INTRAVENOUS; SUBCUTANEOUS at 20:57

## 2022-10-31 RX ADMIN — ASPIRIN 81 MG CHEWABLE TABLET 324 MG: 81 TABLET CHEWABLE at 10:12

## 2022-10-31 RX ADMIN — IPRATROPIUM BROMIDE AND ALBUTEROL SULFATE 3 ML: .5; 3 SOLUTION RESPIRATORY (INHALATION) at 10:51

## 2022-10-31 RX ADMIN — METOPROLOL TARTRATE 12.5 MG: 25 TABLET, FILM COATED ORAL at 20:57

## 2022-10-31 RX ADMIN — GUAIFENESIN 200 MG: 100 SOLUTION ORAL at 00:26

## 2022-10-31 RX ADMIN — IPRATROPIUM BROMIDE AND ALBUTEROL SULFATE 3 ML: .5; 3 SOLUTION RESPIRATORY (INHALATION) at 15:30

## 2022-10-31 RX ADMIN — GABAPENTIN 300 MG: 300 CAPSULE ORAL at 00:26

## 2022-10-31 RX ADMIN — ACETAMINOPHEN 650 MG: 325 TABLET, FILM COATED ORAL at 16:17

## 2022-10-31 RX ADMIN — ATORVASTATIN CALCIUM 40 MG: 40 TABLET, FILM COATED ORAL at 20:57

## 2022-11-01 LAB
ANION GAP SERPL CALCULATED.3IONS-SCNC: 6 MMOL/L (ref 5–15)
BH BB BLOOD EXPIRATION DATE: NORMAL
BH BB BLOOD TYPE BARCODE: 5100
BH BB DISPENSE STATUS: NORMAL
BH BB PRODUCT CODE: NORMAL
BH BB UNIT NUMBER: NORMAL
BUN SERPL-MCNC: 19 MG/DL (ref 8–23)
BUN/CREAT SERPL: 28.4 (ref 7–25)
CALCIUM SPEC-SCNC: 8.2 MG/DL (ref 8.6–10.5)
CHLORIDE SERPL-SCNC: 100 MMOL/L (ref 98–107)
CO2 SERPL-SCNC: 31 MMOL/L (ref 22–29)
CREAT SERPL-MCNC: 0.67 MG/DL (ref 0.57–1)
CROSSMATCH INTERPRETATION: NORMAL
DEPRECATED RDW RBC AUTO: 46.2 FL (ref 37–54)
EGFRCR SERPLBLD CKD-EPI 2021: 99.6 ML/MIN/1.73
ERYTHROCYTE [DISTWIDTH] IN BLOOD BY AUTOMATED COUNT: 13 % (ref 12.3–15.4)
GLUCOSE BLDC GLUCOMTR-MCNC: 132 MG/DL (ref 70–130)
GLUCOSE BLDC GLUCOMTR-MCNC: 158 MG/DL (ref 70–130)
GLUCOSE BLDC GLUCOMTR-MCNC: 186 MG/DL (ref 70–130)
GLUCOSE BLDC GLUCOMTR-MCNC: 202 MG/DL (ref 70–130)
GLUCOSE SERPL-MCNC: 144 MG/DL (ref 65–99)
HCT VFR BLD AUTO: 31.5 % (ref 34–46.6)
HGB BLD-MCNC: 10 G/DL (ref 12–15.9)
MAGNESIUM SERPL-MCNC: 1.8 MG/DL (ref 1.6–2.4)
MCH RBC QN AUTO: 30.7 PG (ref 26.6–33)
MCHC RBC AUTO-ENTMCNC: 31.7 G/DL (ref 31.5–35.7)
MCV RBC AUTO: 96.6 FL (ref 79–97)
PA ADP PRP-ACNC: 182 PRU
PLATELET # BLD AUTO: 180 10*3/MM3 (ref 140–450)
PMV BLD AUTO: 11.2 FL (ref 6–12)
POTASSIUM SERPL-SCNC: 3.8 MMOL/L (ref 3.5–5.2)
RBC # BLD AUTO: 3.26 10*6/MM3 (ref 3.77–5.28)
SODIUM SERPL-SCNC: 137 MMOL/L (ref 136–145)
UNIT  ABO: NORMAL
UNIT  RH: NORMAL
WBC NRBC COR # BLD: 10.4 10*3/MM3 (ref 3.4–10.8)

## 2022-11-01 PROCEDURE — 63710000001 INSULIN LISPRO (HUMAN) PER 5 UNITS: Performed by: INTERNAL MEDICINE

## 2022-11-01 PROCEDURE — 25010000002 FUROSEMIDE PER 20 MG: Performed by: INTERNAL MEDICINE

## 2022-11-01 PROCEDURE — 82962 GLUCOSE BLOOD TEST: CPT

## 2022-11-01 PROCEDURE — 25010000002 HYDRALAZINE PER 20 MG: Performed by: NURSE PRACTITIONER

## 2022-11-01 PROCEDURE — 97530 THERAPEUTIC ACTIVITIES: CPT

## 2022-11-01 PROCEDURE — 99232 SBSQ HOSP IP/OBS MODERATE 35: CPT | Performed by: INTERNAL MEDICINE

## 2022-11-01 PROCEDURE — 94799 UNLISTED PULMONARY SVC/PX: CPT

## 2022-11-01 PROCEDURE — 63710000001 INSULIN DETEMIR PER 5 UNITS: Performed by: INTERNAL MEDICINE

## 2022-11-01 PROCEDURE — 97166 OT EVAL MOD COMPLEX 45 MIN: CPT

## 2022-11-01 PROCEDURE — 83735 ASSAY OF MAGNESIUM: CPT | Performed by: THORACIC SURGERY (CARDIOTHORACIC VASCULAR SURGERY)

## 2022-11-01 PROCEDURE — 80048 BASIC METABOLIC PNL TOTAL CA: CPT | Performed by: PHYSICIAN ASSISTANT

## 2022-11-01 PROCEDURE — 25010000002 HEPARIN (PORCINE) PER 1000 UNITS: Performed by: INTERNAL MEDICINE

## 2022-11-01 PROCEDURE — 0 MAGNESIUM SULFATE 4 GM/100ML SOLUTION: Performed by: PHYSICIAN ASSISTANT

## 2022-11-01 PROCEDURE — 85576 BLOOD PLATELET AGGREGATION: CPT | Performed by: PHYSICIAN ASSISTANT

## 2022-11-01 PROCEDURE — 99024 POSTOP FOLLOW-UP VISIT: CPT | Performed by: THORACIC SURGERY (CARDIOTHORACIC VASCULAR SURGERY)

## 2022-11-01 PROCEDURE — 85027 COMPLETE CBC AUTOMATED: CPT | Performed by: THORACIC SURGERY (CARDIOTHORACIC VASCULAR SURGERY)

## 2022-11-01 RX ORDER — HYDRALAZINE HYDROCHLORIDE 20 MG/ML
10 INJECTION INTRAMUSCULAR; INTRAVENOUS EVERY 8 HOURS PRN
Status: DISCONTINUED | OUTPATIENT
Start: 2022-11-01 | End: 2022-11-05 | Stop reason: HOSPADM

## 2022-11-01 RX ORDER — ASPIRIN 81 MG/1
324 TABLET, CHEWABLE ORAL DAILY
Status: DISCONTINUED | OUTPATIENT
Start: 2022-11-02 | End: 2022-11-05 | Stop reason: HOSPADM

## 2022-11-01 RX ORDER — FUROSEMIDE 10 MG/ML
40 INJECTION INTRAMUSCULAR; INTRAVENOUS ONCE
Status: COMPLETED | OUTPATIENT
Start: 2022-11-01 | End: 2022-11-01

## 2022-11-01 RX ORDER — HYDROCODONE BITARTRATE AND ACETAMINOPHEN 5; 325 MG/1; MG/1
1 TABLET ORAL EVERY 6 HOURS PRN
Status: DISCONTINUED | OUTPATIENT
Start: 2022-11-01 | End: 2022-11-04

## 2022-11-01 RX ORDER — LISINOPRIL 10 MG/1
10 TABLET ORAL
Status: DISCONTINUED | OUTPATIENT
Start: 2022-11-01 | End: 2022-11-05 | Stop reason: HOSPADM

## 2022-11-01 RX ORDER — AMOXICILLIN 250 MG
2 CAPSULE ORAL 2 TIMES DAILY
Status: DISCONTINUED | OUTPATIENT
Start: 2022-11-01 | End: 2022-11-05 | Stop reason: HOSPADM

## 2022-11-01 RX ADMIN — INSULIN LISPRO 2 UNITS: 100 INJECTION, SOLUTION INTRAVENOUS; SUBCUTANEOUS at 12:05

## 2022-11-01 RX ADMIN — SENNOSIDES AND DOCUSATE SODIUM 2 TABLET: 50; 8.6 TABLET ORAL at 22:11

## 2022-11-01 RX ADMIN — INSULIN LISPRO 2 UNITS: 100 INJECTION, SOLUTION INTRAVENOUS; SUBCUTANEOUS at 17:46

## 2022-11-01 RX ADMIN — ACETAMINOPHEN 650 MG: 325 TABLET, FILM COATED ORAL at 14:09

## 2022-11-01 RX ADMIN — ATORVASTATIN CALCIUM 40 MG: 40 TABLET, FILM COATED ORAL at 22:10

## 2022-11-01 RX ADMIN — OXYCODONE 10 MG: 5 TABLET ORAL at 00:48

## 2022-11-01 RX ADMIN — SENNOSIDES 10 ML: 8.8 LIQUID ORAL at 08:10

## 2022-11-01 RX ADMIN — MAGNESIUM SULFATE HEPTAHYDRATE 4 G: 40 INJECTION, SOLUTION INTRAVENOUS at 06:04

## 2022-11-01 RX ADMIN — IPRATROPIUM BROMIDE AND ALBUTEROL SULFATE 3 ML: .5; 3 SOLUTION RESPIRATORY (INHALATION) at 15:57

## 2022-11-01 RX ADMIN — INSULIN LISPRO 2 UNITS: 100 INJECTION, SOLUTION INTRAVENOUS; SUBCUTANEOUS at 12:06

## 2022-11-01 RX ADMIN — INSULIN LISPRO 2 UNITS: 100 INJECTION, SOLUTION INTRAVENOUS; SUBCUTANEOUS at 08:09

## 2022-11-01 RX ADMIN — LISINOPRIL 10 MG: 10 TABLET ORAL at 10:48

## 2022-11-01 RX ADMIN — ACETAMINOPHEN 650 MG: 325 TABLET, FILM COATED ORAL at 08:10

## 2022-11-01 RX ADMIN — HEPARIN SODIUM 5000 UNITS: 5000 INJECTION INTRAVENOUS; SUBCUTANEOUS at 14:03

## 2022-11-01 RX ADMIN — IPRATROPIUM BROMIDE AND ALBUTEROL SULFATE 3 ML: .5; 3 SOLUTION RESPIRATORY (INHALATION) at 19:34

## 2022-11-01 RX ADMIN — HYDRALAZINE HYDROCHLORIDE 10 MG: 20 INJECTION INTRAMUSCULAR; INTRAVENOUS at 00:15

## 2022-11-01 RX ADMIN — DOCUSATE SODIUM LIQUID 100 MG: 100 LIQUID ORAL at 08:32

## 2022-11-01 RX ADMIN — Medication 10 ML: at 22:12

## 2022-11-01 RX ADMIN — METOPROLOL TARTRATE 12.5 MG: 25 TABLET, FILM COATED ORAL at 08:10

## 2022-11-01 RX ADMIN — OXYCODONE 10 MG: 5 TABLET ORAL at 18:31

## 2022-11-01 RX ADMIN — ACETAMINOPHEN 650 MG: 325 TABLET, FILM COATED ORAL at 22:27

## 2022-11-01 RX ADMIN — HEPARIN SODIUM 5000 UNITS: 5000 INJECTION INTRAVENOUS; SUBCUTANEOUS at 22:12

## 2022-11-01 RX ADMIN — HEPARIN SODIUM 5000 UNITS: 5000 INJECTION INTRAVENOUS; SUBCUTANEOUS at 06:04

## 2022-11-01 RX ADMIN — ASPIRIN 81 MG CHEWABLE TABLET 324 MG: 81 TABLET CHEWABLE at 08:10

## 2022-11-01 RX ADMIN — ESCITALOPRAM OXALATE 10 MG: 10 TABLET ORAL at 08:10

## 2022-11-01 RX ADMIN — METOPROLOL TARTRATE 12.5 MG: 25 TABLET, FILM COATED ORAL at 22:11

## 2022-11-01 RX ADMIN — INSULIN LISPRO 3 UNITS: 100 INJECTION, SOLUTION INTRAVENOUS; SUBCUTANEOUS at 22:11

## 2022-11-01 RX ADMIN — ACETAMINOPHEN 650 MG: 325 TABLET, FILM COATED ORAL at 00:15

## 2022-11-01 RX ADMIN — FUROSEMIDE 40 MG: 10 INJECTION, SOLUTION INTRAMUSCULAR; INTRAVENOUS at 10:46

## 2022-11-01 RX ADMIN — CLOPIDOGREL BISULFATE 75 MG: 75 TABLET ORAL at 08:10

## 2022-11-01 RX ADMIN — INSULIN DETEMIR 5 UNITS: 100 INJECTION, SOLUTION SUBCUTANEOUS at 08:10

## 2022-11-01 RX ADMIN — GUAIFENESIN 200 MG: 100 SOLUTION ORAL at 08:10

## 2022-11-01 RX ADMIN — PANTOPRAZOLE SODIUM 40 MG: 40 INJECTION, POWDER, FOR SOLUTION INTRAVENOUS at 06:04

## 2022-11-01 NOTE — PLAN OF CARE
Goal Outcome Evaluation:  Plan of Care Reviewed With: patient        Progress: improving  Outcome Evaluation: VSS at this time.  Complaints of pain treated with PO analgesics with relief.  Remains intermittently disoriented/drowsy.  Able to state she is in [facility] for open heart surgery and that it is October 2022.  Pt did better by abiding by sternal precautions this shift compared to last.  Ambulated 60 feet last night before bed, slept most of the night.  NSR on cardiac monitor with frequent ectopy, remains on 2LNC . SBP elevated up as high as 160 this shift, provider notified and ordered PRN Hydralazine which improved BP.  Diuresed with 40 mg Lasix last night, 800 mL UOP this shift with 2 occurences as well.  Up to the chair this morning with minimal assist.  No acute overnight events.

## 2022-11-01 NOTE — PLAN OF CARE
Goal Outcome Evaluation:  Plan of Care Reviewed With: patient        Progress: no change  Outcome Evaluation: Pt increased ambulation distance to 60ft with Seth x2 +1 and B UE support. Chair follow for safety. Pt required max cueing for upright posture and increased stride length. Pt impulsive with very poor safety awareness and minimal command following. Attempting to sit multiple times without warning. Pt refused additional ambulation despite max encouragement. Continue to progress as appropriate.

## 2022-11-01 NOTE — DISCHARGE PLACEMENT REQUEST
"Case management - [facility] [phone]  Shantel Nance (62 y.o. Female)     Date of Birth   1960    Social Security Number       Address   [address] [place] [id number]    Home Phone   [phone]    MRN   [id number]       Mormon   Unknown    Marital Status                               Admission Date   10/27/22    Admission Type   Urgent    Admitting Provider   Bayron Diaz MD    Attending Provider   Bayron Diaz MD    Department, Room/Bed   [facility]       Discharge Date       Discharge Disposition       Discharge Destination                               Attending Provider: Bayron Diaz MD    Allergies: Adhesive Tape, Contrast Dye, Hydrochlorothiazide, Keflex [Cephalexin], Penicillins, Sulfa Antibiotics    Isolation: None   Infection: None   Code Status: CPR    Ht: 160 cm (62.99\")   Wt: 82.1 kg (181 lb)    Admission Cmt: None   Principal Problem: I21.4 CABG 10/28/22 [I21.4]                 Active Insurance as of 10/27/2022     Primary Coverage     Payor Plan Insurance Group Employer/Plan Group    ANTHEM MEDICARE REPLACEMENT ANTHEM MEDICARE ADVANTAGE [id number]     Payor Plan Address Payor Plan Phone Number Payor Plan Fax Number Effective Dates    [address] [phone]  5/1/2022 - None Entered    [facility] [id number]       Subscriber Name Subscriber Birth Date Member ID       SHANTEL NANCE 1960 [id number]           Secondary Coverage     Payor Plan Insurance Group Employer/Plan Group    [employer]LL[employer] [place] WELL[place]ARE MEDICAID      Payor Plan Address Payor Plan Phone Number Payor Plan Fax Number Effective Dates    [address] [address] [phone]  6/30/2016 - None Entered    [facility] [id number]       Subscriber Name Subscriber Birth Date Member ID       SHANTEL NANCE 1960 [id number]                 Emergency Contacts      (Rel.) Home Phone Work Phone Mobile Phone    Patricia,Mary Beth (Sister) [phone] -- [phone]    "            History & Physical      Bayron Diaz MD at 10/27/22 2310                CTS H&P     Patient Care Team:  Osiel Peña MD as PCP - General (Family Medicine)  Referring MD:[facility]    Chief Complaint:  MVCAD    HPI  Patient is a 62 y.o. female with hypertension, hyperlipidemia, uncontrolled diabetes and known coronary artery disease.  Patient has had diagnosis of early onset coronary disease and has multiple stents placed in the past.  She also has COPD and a long history of smoking tobacco use.  Patient has been complaining of chest pain for the past 2 months.  On 10/26/2022 the patient's pain had progressed and was radiating down her arm and up her neck.  At that point she presented outside hospital and a cardiac work-up ensued.  She had a noted elevated D-dimer but a CTA of the chest was reportedly negative for PE.  Also reportedly the patient had a drug screen which tested positive for cocaine and amphetamines.  She was referred here for further evaluation and consideration for coronary bypass grafting.  Upon admission and questioning the patient was found to be groggy.  She is alert and oriented.  States she does use cocaine and meth when she needs to pick me up.  States the last time she used was the day prior to admission at [facility]    Review of Systems  Pertinent items are noted in HPI.    History  Past Medical History:   Diagnosis Date   • Anxiety    • CAD (coronary artery disease)    • Cancer of stomach (HCC)     Hx of stomach and uterine ca   • Depression    • Diabetes mellitus (HCC)     Type 2   • GERD (gastroesophageal reflux disease)    • History of radical hysterectomy    • Hyperlipidemia    • Hypertension    • MI (mitral incompetence)     Hx   • Pancreatitis     Chronic   • PTSD (post-traumatic stress disorder)    • Tumors     Stomach tumors     Past Surgical History:   Procedure Laterality Date   • CARDIOVASCULAR STRESS TEST  11/08/2014    L.  Myoview-([facility]. Dr. Floyd) EF44%. Inferolateral Infarct with Ischemia.   • CARDIOVASCULAR STRESS TEST  03/10/2015    L. Myoview:EF48% inferior ischemia, lateral infarctm ranexa added   • CATH LAB PROCEDURE  2000    Cath-(FI)7 stents   • CATH LAB PROCEDURE  2004    Cath-(Vand) 1 stent   • CATH LAB PROCEDURE  03/12/2014    Cath-(Dr. Floyd) 50-70%LAD. 30-50%Instent stenosis of RCA.   • ECHO - CONVERTED  11/07/2014    Echo-([facility]. ) Mild depressed EF   • ECHO - CONVERTED  03/10/2015    Echo: EF: 50-55%, RVSP 19mmHg, mild MR   • HYSTERECTOMY      Radical     Family History   Problem Relation Age of Onset   • Heart disease Mother    • Hypertension Mother    • Cancer Mother    • Parkinsonism Father    • Hypertension Father    • Heart attack Brother    • Stroke Brother    • Hypertension Sister    • Hypertension Brother    • Hypertension Sister      Social History     Tobacco Use   • Smoking status: Every Day     Packs/day: 1.00     Years: 47.00     Pack years: 47.00     Types: Cigarettes   • Smokeless tobacco: Never   • Tobacco comments:     9/3/15   Substance Use Topics   • Alcohol use: Yes     Comment: socially, drink one beer a month   • Drug use: No     Medications Prior to Admission   Medication Sig Dispense Refill Last Dose   • aspirin 81 MG EC tablet Take 81 mg by mouth daily.      • atorvastatin (LIPITOR) 10 MG tablet Take 10 mg by mouth daily.      • atorvastatin (LIPITOR) 20 MG tablet TAKE ONE TABLET BY MOUTH EVERY NIGHT AT BEDTIME 90 tablet 1    • escitalopram (LEXAPRO) 10 MG tablet Take 10 mg by mouth daily.      • gabapentin (NEURONTIN) 800 MG tablet Take 800 mg by mouth 3 (three) times a day.      • lisinopril (PRINIVIL,ZESTRIL) 10 MG tablet Take 10 mg by mouth daily.      • metoprolol tartrate (LOPRESSOR) 50 MG tablet Take 50 mg by mouth 2 (two) times a day.      • RANEXA 500 MG 12 hr tablet TAKE ONE TABLET BY MOUTH TWICE A  tablet 1      Allergies:  Adhesive tape, Contrast dye,  Hydrochlorothiazide, Keflex [cephalexin], Penicillins, and Sulfa antibiotics    Objective    Vital Signs  Temp:  [97.7 °F (36.5 °C)-98.6 °F (37 °C)] 97.7 °F (36.5 °C)  Heart Rate:  [71-96] 73  Resp:  [18] 18  BP: (115-133)/(71-89) 125/74    Physical Exam:  General Appearance: Well-developed well-nourished  Head: Atraumatic normocephalic  Neck: Supple  Lungs: Unlabored patient currently on 4 L nasal cannula with 94% oxygen saturation  Heart: Regular rate and rhythm  Abdomen: Obese, nontender no masses bruits  Extremities: Warm to the touch no significant peripheral edema  Pulses: Good palpable pedal pulses and radial artery pulses bilaterally  Skin: Warm and dry without rashes or lesions noted  Neurologic: Groggy and somnolent.  Moves all extremities.  Alert and oriented to person place          Data Review:        Assessment:    MVCAD with history of multiple stents    I25.10 MV CAD s/p stents     E11.9 T2DM     F19.90 Illicit drug use    I21.4 NSTEMI    I10 Hypertension    E78.5 Dyslipidemia    K21.9 GERD    Z98.84 Hx of laparoscopic gastric banding    Z72 Tobacco use  Tobacco use with heavy smoking history with  COPD  Uncontrolled diabetes with a hemoglobin A1c of 9.10      Plan:  Patient is a late arrival for admission and preoperative workup for tentative CABG 10- with Dr Diaz. Preop orders have been placed in Epic and I have asked to have the Cardiac films/images  be uploaded into Epic ASAP for Dr Diaz's viewing.   P2Y12, Echo, carotid duplex, CXR, PFTs and blood work ordered and Pending.  Recheck urine drug screen was positive for amphetamine, methamphetamine and cocaine    I agree with the above assessment.  I saw the patient earlier this morning and have also discussed her case with her cardiologist in [place] on 2 occasions.  She states that for nearly 1 year she cannot walk from her car to the grocery store without having chest pain.  She has significant coronary artery disease and is an  ongoing smoker.  She has no advanced directive on file and declines such.  She is positive on her urine drug screen for cocaine, amphetamines, and methamphetamines, I explained that her surgery has a risk of fatal stroke bleeding infection and death and she is agreeable to proceed but she is quite nervous.    MATIAS Hayden  10/28/22  08:05 EDT          Electronically signed by Bayron Diaz MD at 10/28/22 1028          Physician Progress Notes (last 24 hours)      Bayron Diaz MD at 11/01/22 0649          CTS Progress Note       LOS: 5 days   Patient Care Team:  Osiel Peña MD as PCP - General (Family Medicine)    Chief Complaint: NSTEMI (non-ST elevated myocardial infarction) ([facility])    Vital Signs:  Temp:  [97.6 °F (36.4 °C)-98.6 °F (37 °C)] 97.6 °F (36.4 °C)  Heart Rate:  [62-76] 66  Resp:  [12-26] 12  BP: (106-161)/(49-87) 127/79    Physical Exam:       Results:   Results from last 7 days   Lab Units 11/01/22  0342   WBC 10*3/mm3 10.40   HEMOGLOBIN g/dL 10.0*   HEMATOCRIT % 31.5*   PLATELETS 10*3/mm3 180     Results from last 7 days   Lab Units 11/01/22  0342   SODIUM mmol/L 137   POTASSIUM mmol/L 3.8   CHLORIDE mmol/L 100   CO2 mmol/L 31.0*   BUN mg/dL 19   CREATININE mg/dL 0.67   GLUCOSE mg/dL 144*   CALCIUM mg/dL 8.2*           Imaging Results (Last 24 Hours)     ** No results found for the last 24 hours. **          Assessment      I21.4 CABG 10/28/22    I25.10 MV CAD s/p stents     E11.9 T2DM     F19.90 Illicit drug use    I10 Hypertension    E78.5 Dyslipidemia    K21.9 GERD    Z98.84 Hx of laparoscopic gastric banding    Z72 Tobacco use        Plan   Begin to reduce narcotic dosage  P2 Y 12 today    Please note that portions of this note were completed with a voice recognition program. Efforts were made to edit the dictations, but occasionally words are mistranscribed.    Bayrno Diaz MD  11/01/22  06:50 EDT        Electronically signed by Bayron Diaz MD at 11/01/22  0650     Kerwin Bella MD at 10/31/22 2050          Pulmonary/Critical Care Follow-up     LOS: 4 days   Patient Care Team:  Osiel Peña MD as PCP - General (Family Medicine)    Chief Complaint/reason: NSTEMI/medical management of issues postoperatively after CABG including, but not limited to: Respiratory, hemic, and electrolyte management.      Subjective      History reviewed and updated as indicated.    Interval History:     Patient was on Precedex overnight and was drowsy this morning.  Precedex was stopped and she became more alert.  She is still fairly confused and at times moans.  She did ambulate in the robles with physical therapy.  No fevers or chills.    History taken from: Patient, staff    PMH/FH/Social History were reviewed and updated appropriately in the electronic medical record.     Review of Systems:    Review of 14 systems was completed with positives and pertinent negatives noted in the subjective section.  All other systems reviewed and are negative.         Objective     Vital Signs  Temp:  [97.9 °F (36.6 °C)-98.6 °F (37 °C)] 98.6 °F (37 °C)  Heart Rate:  [62-76] 73  Resp:  [12-26] 14  BP: (102-145)/(49-86) 145/76  10/30 0701 - 10/31 0700  In: 1884 [P.O.:120; I.V.:1614]  Out: 2330 [Urine:2130]  Body mass index is 32.07 kg/m².     IV drips:  dexmedetomidine, Last Rate: Stopped (10/31/22 0800)  niCARdipine  nitroglycerin  norepinephrine, Last Rate: Stopped (10/31/22 0200)       Physical Exam:     Constitutional:   Alert, in no acute distress   Head:   Normocephalic, atraumatic   Eyes:           Lids and lashes normal, conjunctivae and sclerae normal.  PER   ENMT:  Ears appear intact with no abnormalities noted     Lips normal.     Neck:  Trachea midline, no JVD   Lungs/Resp:    Normal effort, symmetric chest rise, no crepitus, diminished breath sounds at bases bilaterally bilaterally.               Heart/CV:   Regular rhythm and normal rate, no murmur   Abdomen/GI:    Soft, nontender,  nondistended   :    Deferred   Extremities/MSK:  No clubbing or cyanosis.  Trace bilateral lower extremity edema.     Pulses:  Pulses palpable and equal bilaterally   Skin:  No bleeding, bruising or rash   Heme/Lymph:  No cervical or supraclavicular adenopathy.   Neurologic:    Psychiatric:    Moves all extremities with no obvious focal motor deficit.  Cranial nerves 2 - 12 grossly intact  Non-agitated, normal affect.    The above physical exam findings were reviewed and reflect my exam findings as of today's exam.   Electronically signed by:  Kerwin Bella MD  10/31/22  20:50 EDT      Results Review:     I reviewed the patient's new clinical results.   Results from last 7 days   Lab Units 10/31/22  0308 10/30/22  0216 10/29/22  1419 10/28/22  1621 10/28/22  0012   SODIUM mmol/L 139 141 142   < > 139   POTASSIUM mmol/L 4.1 4.6 3.9   < > 4.3   CHLORIDE mmol/L 104 107 108*   < > 101   CO2 mmol/L 28.0 27.0 27.0   < > 26.0   BUN mg/dL 19 22 22   < > 15   CREATININE mg/dL 0.75 0.81 0.93   < > 0.80   CALCIUM mg/dL 7.9* 7.7* 8.1*   < > 9.4   BILIRUBIN mg/dL  --   --   --   --  0.4   ALK PHOS U/L  --   --   --   --  110   ALT (SGPT) U/L  --   --   --   --  22   AST (SGOT) U/L  --   --   --   --  21   GLUCOSE mg/dL 135* 158* 127*   < > 382*    < > = values in this interval not displayed.     Results from last 7 days   Lab Units 10/31/22  0308 10/30/22  0216 10/29/22  0209   WBC 10*3/mm3 10.04 12.10* 17.82*   HEMOGLOBIN g/dL 9.6* 10.0* 10.8*   HEMATOCRIT % 30.4* 31.5* 33.8*   PLATELETS 10*3/mm3 132* 152 237     Results from last 7 days   Lab Units 10/29/22  1418   PH, ARTERIAL pH units 7.400   PO2 ART mm Hg 62.7*   PCO2, ARTERIAL mm Hg 45.3*   HCO3 ART mmol/L 28.0*     Results from last 7 days   Lab Units 10/31/22  0308 10/30/22  0216 10/29/22  1419   MAGNESIUM mg/dL 2.0 2.1 2.3   PHOSPHORUS mg/dL 3.2 3.9 3.4       I reviewed the patient's new imaging including images and reports.    Chest x-ray 10/31/2022: Right  internal jugular catheter in place with median sternotomy wires, cardiomegaly with moderate pulmonary congestion and small bilateral pleural effusions which are stable.  No definite pneumothorax.  Left-sided chest changes been removed.    Medication Review:   acetaminophen, 650 mg, Oral, Q8H  aspirin, 324 mg, Nasogastric, Daily  atorvastatin, 40 mg, Oral, Nightly  clopidogrel, 75 mg, Oral, Daily  docusate sodium, 100 mg, Nasogastric, BID   And  sennosides, 10 mL, Nasogastric, BID  escitalopram, 10 mg, Oral, Daily  gabapentin, 300 mg, Oral, Q8H  heparin (porcine), 5,000 Units, Subcutaneous, Q8H  insulin detemir, 5 Units, Subcutaneous, Daily  insulin lispro, 0-7 Units, Subcutaneous, 4x Daily With Meals & Nightly  ipratropium-albuterol, 3 mL, Nebulization, 4x Daily - RT  metoprolol tartrate, 12.5 mg, Oral, Q12H  pantoprazole, 40 mg, Intravenous, Q AM  pharmacy consult - [place], , Does not apply, Daily  sodium chloride, 10 mL, Intravenous, Q12H      dexmedetomidine, 0.2-1.5 mcg/kg/hr, Last Rate: Stopped (10/31/22 0800)  niCARdipine, 5-15 mg/hr  nitroglycerin, 5-200 mcg/min  norepinephrine, 0.02-0.3 mcg/kg/min, Last Rate: Stopped (10/31/22 0200)        Assessment & Plan         I21.4 CABG 10/28/22    I25.10 MV CAD s/p stents     E11.9 T2DM     F19.90 Illicit drug use    I10 Hypertension    E78.5 Dyslipidemia    K21.9 GERD    Z98.84 Hx of laparoscopic gastric banding    Z72 Tobacco use    62 y.o. female admitted in transfer from [facility] on October 27 with initial complaint of exertional chest pain and found to have a non-STEMI.  Patient has known coronary artery disease and prior stents.  CTA chest at outside hospital was negative for pulmonary embolism.  Urine drug screen was positive for cocaine, amphetamine, and methamphetamine.  Outside hospital left heart catheterization showed multivessel coronary disease.  She was transferred for surgical revascularization.  She underwent CABG x3 by   Joe on 10/28/2022.  Patient was extubated on 2022.    Patient was on Precedex overnight.  She is now off of Precedex.  She ambulated in the robles.  Oxygen requirement is down significantly to 2 L nasal cannula.    Plan:  1.  For coronary artery disease: Status post CABG.  Postoperative surgical management per CT surgery.  2.  For diabetes: Continue Levemir and correction insulin.  Add low-dose prandial insulin.  3.  For depression: Continue Lexapro.  4.  Respiratory: Wean supplemental oxygen as tolerated.  Mobilize.  Encourage incentive spirometer use.  Diuresis as tolerated.  5.  For tobacco use: Encouraged smoking cessation.  6.  For agitation/illicit drug use: Wean off of Precedex.  Chemical dependency consult ongoing.    Electronically signed by:    Kerwin Bella MD  10/31/22  20:50 EDT      *. Please note that portions of this note were completed with [employer] [facility] voice recognition program.     Electronically signed by Kerwin Bella MD at 10/31/22 3694          Physical Therapy Notes (last 48 hours)      Harleen Das PT at 10/31/22 0907  Version 1 of 1       Goal Outcome Evaluation:  Plan of Care Reviewed With: patient           Outcome Evaluation: PT initial evaluation completed for pt s/p CABG x3 presenting with generalized weakness, SOA, confusion, impaired balance, and decreased functional mobility. Pt ambulated 50ft with Seth x2 and B UE support on tele monitor. Pt's decreased independence warrants PT skilled care. Recommend D/C to IP rehab facility.    Electronically signed by Harleen Das PT at 10/31/22 1129     Harleen Das PT at 10/31/22 0907  Version 1 of 1         Patient Name: Shantel Ríos  : 1960    MRN: [id number]                              Today's Date: 10/31/2022       Admit Date: 10/27/2022    Visit Dx:     ICD-10-CM ICD-9-CM   1. Oropharyngeal dysphagia  R13.12 787.22   2. Encounter for examination for normal  comparison and control in clinical research program  Z00.6 V70.7     Patient Active Problem List   Diagnosis   • I25.10 MV CAD s/p stents    • E11.9 T2DM    • F19.90 Illicit drug use   • I21.4 CABG 10/28/22   • I10 Hypertension   • E78.5 Dyslipidemia   • K21.9 GERD   • Z98.84 Hx of laparoscopic gastric banding   • Z72 Tobacco use     Past Medical History:   Diagnosis Date   • Anxiety    • CAD (coronary artery disease)    • Cancer of stomach (HCC)     Hx of stomach and uterine ca   • Depression    • Diabetes mellitus (HCC)     Type 2   • GERD (gastroesophageal reflux disease)    • History of radical hysterectomy    • Hyperlipidemia    • Hypertension    • MI (mitral incompetence)     Hx   • Pancreatitis     Chronic   • PTSD (post-traumatic stress disorder)    • Tumors     Stomach tumors     Past Surgical History:   Procedure Laterality Date   • CARDIOVASCULAR STRESS TEST  11/08/2014    L. Myoview-([facility]. Dr. Floyd) EF44%. Inferolateral Infarct with Ischemia.   • CARDIOVASCULAR STRESS TEST  03/10/2015    L. Myoview:EF48% inferior ischemia, lateral infarctm ranexa added   • CATH LAB PROCEDURE  2000    Cath-(FI)7 stents   • CATH LAB PROCEDURE  2004    Cath-(Vand) 1 stent   • CATH LAB PROCEDURE  03/12/2014    Cath-(Dr. Floyd) 50-70%LAD. 30-50%Instent stenosis of RCA.   • CORONARY ARTERY BYPASS GRAFT N/A 10/28/2022    Procedure: MEDIAN STERNOTOMY, CORONARY ARTERY BYPASS GRAFTING X 3, UTILIZING THE LEFT INTERNAL MAMMARY ARTERY, ENDOSCOPIC VEIN HARVEST OF THE RIGHT GREATER SAPHENOUS VEIN;  Surgeon: Bayron Diaz MD;  [facility]: [facility];  Service: Cardiothoracic;  Laterality: N/A;   • ECHO - CONVERTED  11/07/2014    Echo-([facility]. ) Mild depressed EF   • ECHO - CONVERTED  03/10/2015    Echo: EF: 50-55%, RVSP 19mmHg, mild MR   • HYSTERECTOMY      Radical      General Information     Row Name 10/31/22 1122          Physical Therapy Time and Intention    Document Type evaluation  -KG     Mode of Treatment  physical therapy  -KG     Row Name 10/31/22 1122          General Information    Patient Profile Reviewed yes  -KG     Prior Level of Function independent:;all household mobility;gait;transfer;ADL's;dressing;bathing  -KG     Existing Precautions/Restrictions cardiac;fall;oxygen therapy device and L/min;sternal;other (see comments)  NG; confusion  -KG     Barriers to Rehab medically complex;cognitive status  -KG     Row Name 10/31/22 1122          Living Environment    People in Home alone  -KG     Row Name 10/31/22 1122          Home Main Entrance    Number of Stairs, Main Entrance none  -KG     Row Name 10/31/22 1122          Stairs Within Home, Primary    Number of Stairs, Within Home, Primary none  -KG     Row Name 10/31/22 1122          Cognition    Orientation Status (Cognition) oriented to;person;place  -KG     Row Name 10/31/22 1122          Safety Issues, Functional Mobility    Safety Issues Affecting Function (Mobility) ability to follow commands;at risk behavior observed;awareness of need for assistance;impulsivity;insight into deficits/self-awareness;safety precaution awareness;safety precautions follow-through/compliance;sequencing abilities  -KG     Impairments Affecting Function (Mobility) balance;cognition;coordination;endurance/activity tolerance;motor control;motor planning;pain;postural/trunk control;shortness of breath;strength  -KG     Cognitive Impairments, Mobility Safety/Performance attention;awareness, need for assistance;impulsivity;insight into deficits/self-awareness;safety precaution awareness;safety precaution follow-through;sequencing abilities  -KG           User Key  (r) = Recorded By, (t) = Taken By, (c) = Cosigned By    Initials Name Provider Type    KG Harleen Das, PT Physical Therapist               Mobility     Row Name 10/31/22 1123          Bed Mobility    Comment, (Bed Mobility) UIC  -KG     Row Name 10/31/22 1123          Transfers    Comment, (Transfers) VC's for  sequencing and technique. Pt required max cueing for safe hand placement to maintain sternal precautions.  -KG     Row Name 10/31/22 1123          Sit-Stand Transfer    Sit-Stand [place] (Transfers) minimum assist (75% patient effort);2 person assist;verbal cues  -KG     Row Name 10/31/22 1123          Gait/Stairs (Locomotion)    [place] Level (Gait) minimum assist (75% patient effort);2 person assist;verbal cues  -KG     Assistive Device (Gait) other (see comments)  B UE support on tele monitor  -KG     Distance in Feet (Gait) 50  -KG     Deviations/Abnormal Patterns (Gait) bilateral deviations;richard decreased;festinating/shuffling;stride length decreased  -KG     Bilateral Gait Deviations forward flexed posture;heel strike decreased  -KG     Comment, (Gait/Stairs) Pt demonstrated step to gait pattern with slow richard and short, shuffled steps. Max cueing for upright posture and increased stride length. Pt required frequent cues for proper breathing technique. One standing rest break due to c/o increased fatigue and incisional pain. Pt with diminished quality of gait mechanics with onset of fatigue. Impulsively tried to return to seated position multiple times prior to reaching chair.  -KG           User Key  (r) = Recorded By, (t) = Taken By, (c) = Cosigned By    Initials Name Provider Type    KG Harleen Das, PT Physical Therapist               Obj/Interventions     Row Name 10/31/22 1125          Range of Motion Comprehensive    General Range of Motion no range of motion deficits identified  -KG     Comment, General Range of Motion B LE WFL  -KG     Row Name 10/31/22 1125          Strength Comprehensive (MMT)    Comment, General Manual Muscle Testing (MMT) Assessment B LE grossly 3+/5  -KG     Row Name 10/31/22 1125          Balance    Balance Assessment sitting static balance;standing static balance;standing dynamic balance  -KG     Static Sitting Balance minimal assist  -KG     Position,  Sitting Balance supported;sitting in chair  -KG     Static Standing Balance minimal assist;2-person assist  -KG     Dynamic Standing Balance moderate assist;2-person assist  -KG     Position/Device Used, Standing Balance supported  -KG     Row Name 10/31/22 1125          Sensory Assessment (Somatosensory)    Sensory Assessment (Somatosensory) LE sensation intact  -KG           User Key  (r) = Recorded By, (t) = Taken By, (c) = Cosigned By    Initials Name Provider Type    KG Harleen Das N, PT Physical Therapist               Goals/Plan     Row Name 10/31/22 1128          Bed Mobility Goal 1 (PT)    Activity/Assistive Device (Bed Mobility Goal 1, PT) sit to supine;supine to sit  -KG     [place] Level/Cues Needed (Bed Mobility Goal 1, PT) minimum assist (75% or more patient effort)  -KG     Time Frame (Bed Mobility Goal 1, PT) 2 weeks  -KG     Progress/Outcomes (Bed Mobility Goal 1, PT) goal ongoing  -KG     Row Name 10/31/22 1128          Transfer Goal 1 (PT)    Activity/Assistive Device (Transfer Goal 1, PT) sit-to-stand/stand-to-sit;bed-to-chair/chair-to-bed  -KG     [place] Level/Cues Needed (Transfer Goal 1, PT) contact guard required  -KG     Time Frame (Transfer Goal 1, PT) 2 weeks  -KG     Progress/Outcome (Transfer Goal 1, PT) goal ongoing  -KG     Row Name 10/31/22 1128          Gait Training Goal 1 (PT)    Activity/Assistive Device (Gait Training Goal 1, PT) gait (walking locomotion)  -KG     [place] Level (Gait Training Goal 1, PT) minimum assist (75% or more patient effort)  -KG     Distance (Gait Training Goal 1, PT) 250 feet  -KG     Time Frame (Gait Training Goal 1, PT) 2 weeks  -KG     Progress/Outcome (Gait Training Goal 1, PT) goal ongoing  -KG     Row Name 10/31/22 1128          Therapy Assessment/Plan (PT)    Planned Therapy Interventions (PT) balance training;bed mobility training;gait training;strengthening;transfer training  -KG           User Key  (r) = Recorded By, (t)  = Taken By, (c) = Cosigned By    Initials Name Provider Type    KG Harleen Das N, PT Physical Therapist               Clinical Impression     Row Name 10/31/22 1126          Pain    Additional Documentation Pain Scale: FACES Pre/Post-Treatment (Group)  -KG     Row Name 10/31/22 1126          Pain Scale: FACES Pre/Post-Treatment    Pain: FACES Scale, Pretreatment 2-->hurts little bit  -KG     Posttreatment Pain Rating 6-->hurts even more  -KG     Pain Location incisional  -KG     Pain Location - chest  -KG     Row Name 10/31/22 1126          Plan of Care Review    Plan of Care Reviewed With patient  -KG     Outcome Evaluation PT initial evaluation completed for pt s/p CABG x3 presenting with generalized weakness, SOA, confusion, impaired balance, and decreased functional mobility. Pt ambulated 50ft with Seth x2 and B UE support on tele monitor. Pt's decreased independence warrants PT skilled care. Recommend D/C to IP rehab facility.  -KG     Row Name 10/31/22 1126          Therapy Assessment/Plan (PT)    Patient/Family Therapy Goals Statement (PT) return to PLOF  -KG     Rehab Potential (PT) good, to achieve stated therapy goals  -KG     Criteria for Skilled Interventions Met (PT) yes;skilled treatment is necessary  -KG     Therapy Frequency (PT) daily  -KG     Row Name 10/31/22 1126          Vital Signs    Pre Systolic BP Rehab 123  -KG     Pre Treatment Diastolic BP 86  -KG     Post Systolic BP Rehab 139  -KG     Post Treatment Diastolic BP 79  -KG     Pretreatment Heart Rate (beats/min) 72  -KG     Posttreatment Heart Rate (beats/min) 77  -KG     Pre SpO2 (%) 96  -KG     O2 Delivery Pre Treatment supplemental O2  -KG     Post SpO2 (%) 87  -KG     O2 Delivery Post Treatment supplemental O2  -KG     Pre Patient Position Sitting  -KG     Intra Patient Position Standing  -KG     Post Patient Position Sitting  -KG     Row Name 10/31/22 1126          Positioning and Restraints    Pre-Treatment Position sitting  in chair/recliner  -KG     Post Treatment Position chair  -KG     In Chair notified nsg;reclined;call light within reach;encouraged to call for assist;exit alarm on;RUE elevated;LUE elevated;legs elevated  -KG           User Key  (r) = Recorded By, (t) = Taken By, (c) = Cosigned By    Initials Name Provider Type    Harleen Pizarro PT Physical Therapist               Outcome Measures     Row Name 10/31/22 1128          How much help from another person do you currently need...    Turning from your back to your side while in flat bed without using bedrails? 2  -KG     Moving from lying on back to sitting on the side of a flat bed without bedrails? 2  -KG     Moving to and from a bed to a chair (including a wheelchair)? 3  -KG     Standing up from a chair using your arms (e.g., wheelchair, bedside chair)? 3  -KG     Climbing 3-5 steps with a railing? 1  -KG     To walk in hospital room? 2  -KG     AM-PAC 6 Clicks Score (PT) 13  -KG     Highest level of mobility 4 --> Transferred to chair/commode  -KG     Row Name 10/31/22 1128          Functional Assessment    Outcome Measure Options AM-PAC 6 Clicks Basic Mobility (PT)  -KG           User Key  (r) = Recorded By, (t) = Taken By, (c) = Cosigned By    Initials Name Provider Type    Harleen Pizarro PT Physical Therapist                             Physical Therapy Education     Title: PT OT SLP Therapies (In Progress)     Topic: Physical Therapy (In Progress)     Point: Mobility training (In Progress)     Learning Progress Summary           Patient Acceptance, E, NR by KG at 10/31/2022 0907                   Point: Home exercise program (Not Started)     Learner Progress:  Not documented in this visit.          Point: Body mechanics (In Progress)     Learning Progress Summary           Patient Acceptance, E, NR by KG at 10/31/2022 0907                   Point: Precautions (In Progress)     Learning Progress Summary           Patient Acceptance, E, NR by  KG at 10/31/2022 0907                               User Key     Initials Effective Dates Name Provider Type Discipline    KG 20 -  Hraleen Das PT Physical Therapist PT              PT Recommendation and Plan  Planned Therapy Interventions (PT): balance training, bed mobility training, gait training, strengthening, transfer training  Plan of Care Reviewed With: patient  Outcome Evaluation: PT initial evaluation completed for pt s/p CABG x3 presenting with generalized weakness, SOA, confusion, impaired balance, and decreased functional mobility. Pt ambulated 50ft with Seth x2 and B UE support on tele monitor. Pt's decreased independence warrants PT skilled care. Recommend D/C to  rehab facility.     Time Calculation:    PT Charges     Row Name 10/31/22 0907             Time Calculation    Start Time 0907  -KG      PT Received On 10/31/22  -KG      PT Goal Re-Cert Due Date 11/10/22  -KG         Untimed Charges    PT Eval/Re-eval Minutes 48  -KG         Total Minutes    Untimed Charges Total Minutes 48  -KG       Total Minutes 48  -KG            User Key  (r) = Recorded By, (t) = Taken By, (c) = Cosigned By    Initials Name Provider Type    KG Harleen Das, PT Physical Therapist              Therapy Charges for Today     Code Description Service Date Service Provider Modifiers Qty    87090554669 HC PT EVAL MOD COMPLEXITY 4 10/31/2022 Harleen Das, PT GP 1    82071606194 HC PT THER SUPP EA 15 MIN 10/31/2022 Harleen Das, PT GP 3          PT G-Codes  Outcome Measure Options: AM-PAC 6 Clicks Basic Mobility (PT)  AM-PAC 6 Clicks Score (PT): 13    Cece Das PT  10/31/2022      Electronically signed by Harleen Das PT at 10/31/22 1130          Occupational Therapy Notes (last 48 hours)      Tiffany Wahl, OT at 22 0903          Patient Name: Shantel Ríos  : 1960    MRN: [id number]                              Today's Date:  11/1/2022       Admit Date: 10/27/2022    Visit Dx:     ICD-10-CM ICD-9-CM   1. Oropharyngeal dysphagia  R13.12 787.22   2. Encounter for examination for normal comparison and control in clinical research program  Z00.6 V70.7     Patient Active Problem List   Diagnosis   • I25.10 MV CAD s/p stents    • E11.9 T2DM    • F19.90 Illicit drug use   • I21.4 CABG 10/28/22   • I10 Hypertension   • E78.5 Dyslipidemia   • K21.9 GERD   • Z98.84 Hx of laparoscopic gastric banding   • Z72 Tobacco use     Past Medical History:   Diagnosis Date   • Anxiety    • CAD (coronary artery disease)    • Cancer of stomach (HCC)     Hx of stomach and uterine ca   • Depression    • Diabetes mellitus (HCC)     Type 2   • GERD (gastroesophageal reflux disease)    • History of radical hysterectomy    • Hyperlipidemia    • Hypertension    • MI (mitral incompetence)     Hx   • Pancreatitis     Chronic   • PTSD (post-traumatic stress disorder)    • Tumors     Stomach tumors     Past Surgical History:   Procedure Laterality Date   • CARDIOVASCULAR STRESS TEST  11/08/2014    L. Myoview-([facility]. Dr. Floyd) EF44%. Inferolateral Infarct with Ischemia.   • CARDIOVASCULAR STRESS TEST  03/10/2015    L. Myoview:EF48% inferior ischemia, lateral infarctm ranexa added   • CATH LAB PROCEDURE  2000    Cath-([facility])7 stents   • CATH LAB PROCEDURE  2004    Cath-([facility]) 1 stent   • CATH LAB PROCEDURE  03/12/2014    Cath-(Dr. Floyd) 50-70%LAD. 30-50%Instent stenosis of RCA.   • CORONARY ARTERY BYPASS GRAFT N/A 10/28/2022    Procedure: MEDIAN STERNOTOMY, CORONARY ARTERY BYPASS GRAFTING X 3, UTILIZING THE LEFT INTERNAL MAMMARY ARTERY, ENDOSCOPIC VEIN HARVEST OF THE RIGHT GREATER SAPHENOUS VEIN;  Surgeon: Bayron Diaz MD;  Location: [facility];  Service: Cardiothoracic;  Laterality: N/A;   • [facility]  [facility]    [facility]   • ECHO - CONVERTED  03/10/2015    Echo: EF: 50-55%, RVSP 19mmHg, mild MR   • HYSTERECTOMY       Radical      General Information     Row Name 11/01/22 0952          OT Time and Intention    Document Type evaluation  -KF     Mode of Treatment occupational therapy  -     Row Name 11/01/22 0952          General Information    Patient Profile Reviewed yes  -KF     Existing Precautions/Restrictions cardiac;fall;oxygen therapy device and L/min;sternal;other (see comments)  impulsive  -KF     Barriers to Rehab cognitive status;previous functional deficit;medically complex;ineffective coping  -     Row Name 11/01/22 0952          Occupational Profile    Environmental Supports and Barriers (Occupational Profile) Pt with limited interaction with questions today, cont to assess for equipment needs- per chart homeless and Pt not providing much history at this time  -     Row Name 11/01/22 0952          Living Environment    People in Home alone;other (see comments)  homelessness per chart  -     Row Name 11/01/22 0952          Home Main Entrance    Number of Stairs, Main Entrance none  -KF     Row Name 11/01/22 0952          Stairs Within Home, Primary    Number of Stairs, Within Home, Primary none  -     Row Name 11/01/22 0952          Cognition    Orientation Status (Cognition) oriented to;person;place;verbal cues/prompts needed for orientation;time  -KF     Row Name 11/01/22 0952          Safety Issues, Functional Mobility    Safety Issues Affecting Function (Mobility) ability to follow commands;awareness of need for assistance;safety precaution awareness;safety precautions follow-through/compliance;insight into deficits/self-awareness;judgment  -KF     Impairments Affecting Function (Mobility) balance;cognition;coordination;endurance/activity tolerance;motor control;motor planning;pain;postural/trunk control;shortness of breath;strength  -KF     Cognitive Impairments, Mobility Safety/Performance attention;awareness, need for assistance;insight into deficits/self-awareness;problem-solving/reasoning;safety  precaution follow-through  -     Comment, Safety Issues/Impairments (Mobility) education provided on sternal precautions to reinforce with repositioning and functional transfers included within ADL tasks  -           User Key  (r) = Recorded By, (t) = Taken By, (c) = Cosigned By    Initials Name Provider Type     Tiffany Wahl OT Occupational Therapist                 Mobility/ADL's     Row Name 11/01/22 0955          Bed Mobility    Comment, (Bed Mobility) UIC  -     Row Name 11/01/22 0955          Transfers    Transfers sit-stand transfer;stand-sit transfer  -     Comment, (Transfers) STS x2 with episode of urinary incontinence due to potential urgency; cues throughout for sternal precaution management  -     Row Name 11/01/22 0955          Sit-Stand Transfer    Sit-Stand [place] (Transfers) minimum assist (75% patient effort);2 person assist;verbal cues  -     Assistive Device (Sit-Stand Transfers) other (see comments)  -     Comment, (Sit-Stand Transfer) BUE support  -     Row Name 11/01/22 0955          Stand-Sit Transfer    Stand-Sit [place] (Transfers) contact guard;1 person to manage equipment  -     Row Name 11/01/22 0955          Functional Mobility    Functional Mobility- Safety Issues supplemental O2;weight-shifting ability decreased;step length decreased  -     Functional Mobility- Comment deferred to PT  -     Row Name 11/01/22 0955          Activities of Daily Living    BADL Assessment/Intervention upper body dressing;lower body dressing;toileting;bathing  -     Row Name 11/01/22 0955          Upper Body Dressing Assessment/Training    [place] Level (Upper Body Dressing) don;doff;front opening garment;moderate assist (50% patient effort)  -     Position (Upper Body Dressing) unsupported sitting  -     Row Name 11/01/22 0955          Lower Body Dressing Assessment/Training    [place] Level (Lower Body Dressing) don;doff;socks;dependent (less than  25% patient effort)  -KF     Position (Lower Body Dressing) unsupported sitting  -KF     Row Name 11/01/22 0955          Toileting Assessment/Training    [place] Level (Toileting) change pad/brief;dependent (less than 25% patient effort)  -KF     Position (Toileting) supported standing;unsupported sitting  -KF     Row Name 11/01/22 0955          Bathing Assessment/Intervention    [place] Level (Bathing) lower body;distal lower extremities/feet;dependent (less than 25% patient effort)  -KF     Position (Bathing) unsupported sitting  -KF           User Key  (r) = Recorded By, (t) = Taken By, (c) = Cosigned By    Initials Name Provider Type    KF Tiffany Wahl, OT Occupational Therapist               Obj/Interventions     Row Name 11/01/22 0957          Sensory Assessment (Somatosensory)    Sensory Assessment (Somatosensory) unable/difficult to assess  Pt not able to respond appropriately at this time despite multiple attempts  -     Row Name 11/01/22 0957          Vision Assessment/Intervention    Visual Impairment/Limitations WFL;corrective lenses full-time  -KF     Row Name 11/01/22 0957          Range of Motion Comprehensive    General Range of Motion bilateral upper extremity ROM WFL  -KF     Comment, General Range of Motion within sternal precautions  -KF     Row Name 11/01/22 0957          Strength Comprehensive (MMT)    General Manual Muscle Testing (MMT) Assessment upper extremity strength deficits identified  -KF     Comment, General Manual Muscle Testing (MMT) Assessment unable to assess  due to poor command following of this task, deferred other MMT 2/2 sternal precautions  -KF     Row Name 11/01/22 0957          Balance    Balance Assessment sitting static balance;sitting dynamic balance;standing static balance;standing dynamic balance  -KF     Static Sitting Balance contact guard  -KF     Dynamic Sitting Balance contact guard  -KF     Position, Sitting Balance unsupported;sitting in  chair  -KF     Static Standing Balance contact guard;1 person to manage equipment;verbal cues  -KF     Dynamic Standing Balance minimal assist;2-person assist;verbal cues  -KF     Position/Device Used, Standing Balance supported  -KF     Balance Interventions standing;sit to stand;supported;minimal challenge;weight shifting activity;occupation based/functional task  -KF     Comment, Balance maintains balance with BUE supported intially then single UE support  -KF           User Key  (r) = Recorded By, (t) = Taken By, (c) = Cosigned By    Initials Name Provider Type    KF Tiffany Wahl, OT Occupational Therapist               Goals/Plan     Row Name 11/01/22 1003          Bed Mobility Goal 1 (OT)    Activity/Assistive Device (Bed Mobility Goal 1, OT) sit to supine/supine to sit  -KF     [place] Level/Cues Needed (Bed Mobility Goal 1, OT) minimum assist (75% or more patient effort)  -KF     Time Frame (Bed Mobility Goal 1, OT) long term goal (LTG);10 days  -KF     Strategies/Barriers (Bed Mobility Goal 1, OT) with sternal precaution management  -KF     Progress/Outcomes (Bed Mobility Goal 1, OT) new goal;goal ongoing  -KF     Row Name 11/01/22 1003          Transfer Goal 1 (OT)    Activity/Assistive Device (Transfer Goal 1, OT) toilet;walker, rolling  -KF     [place] Level/Cues Needed (Transfer Goal 1, OT) minimum assist (75% or more patient effort)  -KF     Time Frame (Transfer Goal 1, OT) long term goal (LTG);10 days  -KF     Progress/Outcome (Transfer Goal 1, OT) new goal;goal ongoing  -KF     Row Name 11/01/22 1003          Dressing Goal 1 (OT)    Activity/Device (Dressing Goal 1, OT) lower body dressing  socks/undergarment reacher PRN  -KF     [place]/Cues Needed (Dressing Goal 1, OT) minimum assist (75% or more patient effort)  -KF     Time Frame (Dressing Goal 1, OT) long term goal (LTG);10 days  -KF     Progress/Outcome (Dressing Goal 1, OT) goal ongoing;new goal  -     Row Name 11/01/22  1003          Therapy Assessment/Plan (OT)    Planned Therapy Interventions (OT) activity tolerance training;adaptive equipment training;BADL retraining;occupation/activity based interventions;strengthening exercise;transfer/mobility retraining;functional balance retraining;ROM/therapeutic exercise;patient/caregiver education/training  -           User Key  (r) = Recorded By, (t) = Taken By, (c) = Cosigned By    Initials Name Provider Type    KF Tiffany Wahl, OT Occupational Therapist               Clinical Impression     Row Name 11/01/22 0959          Pain Assessment    Pretreatment Pain Rating 10/10  -KF     Posttreatment Pain Rating 10/10  -KF     Pain Location incisional  -KF     Pain Location - chest  -KF     Pre/Posttreatment Pain Comment RN notified  -KF     Pain Intervention(s) Repositioned;Ambulation/increased activity  -     Row Name 11/01/22 0959          Plan of Care Review    Plan of Care Reviewed With patient  -     Progress improving  -     Outcome Evaluation OT eval completed, Pt presents with deficits in strength, activity tolerance, safety awareness and compliance for ADL and functional transfer completion, recom IPOT d/c IRF pending progress  -     Row Name 11/01/22 0959          Therapy Assessment/Plan (OT)    Rehab Potential (OT) good, to achieve stated therapy goals  -     Criteria for Skilled Therapeutic Interventions Met (OT) yes;meets criteria;skilled treatment is necessary  -KF     Therapy Frequency (OT) daily  -     Row Name 11/01/22 0959          Therapy Plan Review/Discharge Plan (OT)    Equipment Needs Upon Discharge (OT) --  TBD  -KF     Anticipated Discharge Disposition (OT) inpatient rehabilitation facility  -     Row Name 11/01/22 0959          Vital Signs    Pre Systolic BP Rehab 115  RN cleared VSS  -KF     Pre Treatment Diastolic BP 68  -KF     Pre SpO2 (%) 95  -KF     O2 Delivery Pre Treatment supplemental O2  -KF     Post SpO2 (%) 94  -KF     O2 Delivery  Post Treatment supplemental O2  -KF     Pre Patient Position Sitting  -KF     Intra Patient Position Standing  -KF     Post Patient Position Sitting  -KF     Rest Breaks  1  -KF     Row Name 11/01/22 0959          Positioning and Restraints    Pre-Treatment Position sitting in chair/recliner  -KF     Post Treatment Position chair  -KF     In Chair notified nsg;reclined;sitting;call light within reach;encouraged to call for assist;exit alarm on;[place] cushion;legs elevated  -KF           User Key  (r) = Recorded By, (t) = Taken By, (c) = Cosigned By    Initials Name Provider Type    Tiffany Benson OT Occupational Therapist               Outcome Measures     Row Name 11/01/22 1004          How much help from another is currently needed...    Putting on and taking off regular lower body clothing? 1  -KF     Bathing (including washing, rinsing, and drying) 2  -KF     Toileting (which includes using toilet bed pan or urinal) 2  -KF     Putting on and taking off regular upper body clothing 2  -KF     Taking care of personal grooming (such as brushing teeth) 3  -KF     Eating meals 3  -KF     AM-PAC 6 Clicks Score (OT) 13  -KF     Row Name 11/01/22 1004          Functional Assessment    Outcome Measure Options AM-PAC 6 Clicks Daily Activity (OT)  -KF           User Key  (r) = Recorded By, (t) = Taken By, (c) = Cosigned By    Initials Name Provider Type    Tiffany Benson OT Occupational Therapist                Occupational Therapy Education     Title: PT OT SLP Therapies (In Progress)     Topic: Occupational Therapy (In Progress)     Point: ADL training (Done)     Description:   Instruct learner(s) on proper safety adaptation and remediation techniques during self care or transfers.   Instruct in proper use of assistive devices.              Learning Progress Summary           Patient Acceptance, E,D,TB, VU,DU,NR by SALTY at 11/1/2022 1005                   Point: Home exercise program (Not Started)      Description:   Instruct learner(s) on appropriate technique for monitoring, assisting and/or progressing therapeutic exercises/activities.              Learner Progress:  Not documented in this visit.          Point: Precautions (Done)     Description:   Instruct learner(s) on prescribed precautions during self-care and functional transfers.              Learning Progress Summary           Patient Acceptance, E,D,TB, VU,DU,NR by  at 11/1/2022 1005                   Point: Body mechanics (Done)     Description:   Instruct learner(s) on proper positioning and spine alignment during self-care, functional mobility activities and/or exercises.              Learning Progress Summary           Patient Acceptance, E,D,TB, VU,DU,NR by  at 11/1/2022 1005                               User Key     Initials Effective Dates Name Provider Type Discipline     06/16/21 -  Tiffany Wahl OT Occupational Therapist OT              OT Recommendation and Plan  Planned Therapy Interventions (OT): activity tolerance training, adaptive equipment training, BADL retraining, occupation/activity based interventions, strengthening exercise, transfer/mobility retraining, functional balance retraining, ROM/therapeutic exercise, patient/caregiver education/training  Therapy Frequency (OT): daily  Plan of Care Review  Plan of Care Reviewed With: patient  Progress: improving  Outcome Evaluation: OT eval completed, Pt presents with deficits in strength, activity tolerance, safety awareness and compliance for ADL and functional transfer completion, recom IPOT d/c IRF pending progress     Time Calculation:    Time Calculation- OT     Row Name 11/01/22 0903             Time Calculation- OT    OT Start Time 0903  -KF      OT Received On 11/01/22  -      OT Goal Re-Cert Due Date 11/11/22  -         Timed Charges    84013 - OT Therapeutic Activity Minutes 5 -KF      55146 - OT Self Care/Mgmt Minutes 5  -KF         Untimed Charges    OT  Eval/Re-eval Minutes 39  -KF         Total Minutes    Timed Charges Total Minutes 10  -KF      Untimed Charges Total Minutes 39  -KF       Total Minutes 49  -KF            User Key  (r) = Recorded By, (t) = Taken By, (c) = Cosigned By    Initials Name Provider Type    Tiffany Benson OT Occupational Therapist              Therapy Charges for Today     Code Description Service Date Service Provider Modifiers Qty    42294886918  OT THERAPEUTIC ACT EA 15 MIN 11/1/2022 Tiffany Wahl OT GO 1    31173411437 HC OT THER SUPP EA 15 MIN 11/1/2022 Tiffany Wahl OT GO 2    [id number]  OT EVAL MOD COMPLEXITY 3 11/1/2022 Tiffany Wahl OT GO 1               Tiffany Wahl OT  11/1/2022    Electronically signed by Tiffany Wahl OT at 11/01/22 1007     Tiffany Wahl OT at 11/01/22 0903        Goal Outcome Evaluation:  Plan of Care Reviewed With: patient        Progress: improving  Outcome Evaluation: OT eval completed, Pt presents with deficits in strength, activity tolerance, safety awareness and compliance for ADL and functional transfer completion, recom IPOT d/c IRF pending progress    Electronically signed by Tiffany Wahl OT at 11/01/22 1005

## 2022-11-01 NOTE — THERAPY TREATMENT NOTE
Patient Name: Shantel Ríos  : 1960    MRN: [id number]                              Today's Date: 2022       Admit Date: 10/27/2022    Visit Dx:     ICD-10-CM ICD-9-CM   1. Oropharyngeal dysphagia  R13.12 787.22   2. 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 Myoview-([facility]. Dr. Floyd) EF44%. Inferolateral Infarct with Ischemia.   • CARDIOVASCULAR STRESS TEST  03/10/2015    L. Myoview:EF48% inferior ischemia, lateral infarctm ranexa added   • CATH LAB PROCEDURE      Cath-(FI)7 stents   • CATH LAB PROCEDURE      Cath-([facility]) 1 stent   • CATH LAB PROCEDURE  2014    Cath-(Dr. Floyd) 50-70%LAD. 30-50%Instent stenosis of RCA.   • CORONARY ARTERY BYPASS GRAFT N/A 10/28/2022    Procedure: MEDIAN STERNOTOMY, CORONARY ARTERY BYPASS GRAFTING X 3, UTILIZING THE LEFT INTERNAL MAMMARY ARTERY, ENDOSCOPIC VEIN HARVEST OF THE RIGHT GREATER SAPHENOUS VEIN;  Surgeon: Bayron Diaz MD;  Location: [facility];  Service: Cardiothoracic;  Laterality: N/A;   • [facility]  [facility]    [facility]  [facility]   • ECHO - CONVERTED  03/10/2015    Echo: EF: 50-55%, RVSP 19mmHg, mild MR   • HYSTERECTOMY      Radical      General Information     Row Name 11/01/22 1143          Physical Therapy Time and Intention    Document Type therapy note (daily note)  -KG     Mode of Treatment physical therapy  -KG     Row Name 11/01/22 1143          General Information    Existing Precautions/Restrictions cardiac;fall;oxygen therapy device and L/min;sternal  -KG     Barriers to Rehab medically complex;previous functional deficit;cognitive status  -KG     Row Name 11/01/22 1143          Cognition    Orientation Status (Cognition) oriented to;person;place  -KG     Row Name 11/01/22 1143          Safety Issues, Functional Mobility    Safety Issues Affecting Function (Mobility) ability to follow commands;at risk behavior observed;awareness of need for assistance;impulsivity;insight into deficits/self-awareness;safety precaution awareness;safety precautions follow-through/compliance  -KG     Impairments Affecting Function (Mobility) balance;cognition;coordination;endurance/activity tolerance;pain;postural/trunk control;shortness of breath;strength  -KG     Cognitive Impairments, Mobility Safety/Performance attention;awareness, need for assistance;impulsivity;insight into deficits/self-awareness;safety precaution awareness;safety precaution follow-through  -KG           User Key  (r) = Recorded By, (t) = Taken By, (c) = Cosigned By    Initials Name Provider Type    KG Harleen Das PT Physical Therapist               Mobility     Row Name 11/01/22 1144          Bed Mobility    Bed Mobility sit-supine  -KG     Sit-Supine [place] (Bed Mobility) moderate assist (50% patient effort);verbal cues  -KG     Assistive Device (Bed Mobility) head of bed elevated  -KG     Comment, (Bed Mobility) VC's for sequencing and increased safety awareness. Pt impulsively attempting to return to supine position prior to instruction without  assistance.  -KG     Row Name 11/01/22 1144          Transfers    Comment, (Transfers) Max cueing for sequencing and safe hand placement to maintain sternal precautions. [place] [facility] [place]. Pt non-compliant with sternal precautions despite cueing.  -KG     Row Name 11/01/22 1144          Sit-Stand Transfer    Sit-Stand [place] (Transfers) minimum assist (75% patient effort);verbal cues  -KG     Row Name 11/01/22 1144          Gait/Stairs (Locomotion)    [place] Level (Gait) minimum assist (75% patient effort);2 person assist;1 person to manage equipment;verbal cues  chair follow  -KG     Assistive Device (Gait) other (see comments)  B UE support on tele monitor  -KG     Distance in Feet (Gait) 60  -KG     Deviations/Abnormal Patterns (Gait) bilateral deviations;richard decreased;festinating/shuffling;stride length decreased  -KG     Bilateral Gait Deviations forward flexed posture;heel strike decreased  -KG     Comment, (Gait/Stairs) Pt demonstrated step through gait pattern with slow richard and decreased step length. Pt exhibits adequate balance and stability throughout ambulation, but attempts to sit multiple times without warning. Pt very impulsive with poor safety awareness and little to no command following. Requires max encouragement to participate. Pt returned to room in chair after refusing to complete further mobility.  -KG           User Key  (r) = Recorded By, (t) = Taken By, (c) = Cosigned By    Initials Name Provider Type    KG Harleen Das, PT Physical Therapist               Obj/Interventions     Row Name 11/01/22 1316          Balance    Balance Assessment sitting static balance;standing static balance;standing dynamic balance  -KG     Static Sitting Balance contact guard  -KG     Position, Sitting Balance unsupported;sitting in chair  -KG     Static Standing Balance contact guard  -KG     Dynamic Standing Balance minimal assist  -KG     Position/Device Used,  Standing Balance supported  -KG           User Key  (r) = Recorded By, (t) = Taken By, (c) = Cosigned By    Initials Name Provider Type    KG Harleen Das, PT Physical Therapist               Goals/Plan    No documentation.                Clinical Impression     Row Name 11/01/22 1317          Pain    Additional Documentation Pain Scale: FACES Pre/Post-Treatment (Group)  -KG     Row Name 11/01/22 1317          Pain Scale: FACES Pre/Post-Treatment    Pain: FACES Scale, Pretreatment 2-->hurts little bit  -KG     Posttreatment Pain Rating 4-->hurts little more  -KG     Pain Location incisional  -KG     Pain Location - chest  -KG     Row Name 11/01/22 1317          Plan of Care Review    Plan of Care Reviewed With patient  -KG     Progress no change  -KG     Outcome Evaluation Pt increased ambulation distance to 60ft with Seth x2 +1 and B UE support. Chair follow for safety. Pt required max cueing for upright posture and increased stride length. Pt impulsive with very poor safety awareness and minimal command following. Attempting to sit multiple times without warning. Pt refused additional ambulation despite max encouragement. Continue to progress as appropriate.  -KG     Row Name 11/01/22 1317          Vital Signs    Pre Systolic BP Rehab 115  -KG     Pre Treatment Diastolic BP 68  -KG     Pretreatment Heart Rate (beats/min) 61  -KG     Posttreatment Heart Rate (beats/min) 67  -KG     Pre SpO2 (%) 95  -KG     O2 Delivery Pre Treatment supplemental O2  -KG     Post SpO2 (%) 93  -KG     O2 Delivery Post Treatment supplemental O2  -KG     Pre Patient Position Sitting  -KG     Intra Patient Position Standing  -KG     Post Patient Position Supine  -KG     Row Name 11/01/22 1317          Positioning and Restraints    Pre-Treatment Position sitting in chair/recliner  -KG     Post Treatment Position bed  -KG     In Bed notified nsg;supine;call light within reach;encouraged to call for assist;side rails up x3;RUE  elevated;LUE elevated  -KG           User Key  (r) = Recorded By, (t) = Taken By, (c) = Cosigned By    Initials Name Provider Type    Harleen Pizarro, PT Physical Therapist               Outcome Measures     Row Name 11/01/22 1321          How much help from another person do you currently need...    Turning from your back to your side while in flat bed without using bedrails? 2  -KG     Moving from lying on back to sitting on the side of a flat bed without bedrails? 2  -KG     Moving to and from a bed to a chair (including a wheelchair)? 3  -KG     Standing up from a chair using your arms (e.g., wheelchair, bedside chair)? 3  -KG     Climbing 3-5 steps with a railing? 2  -KG     To walk in hospital room? 3  -KG     AM-PAC 6 Clicks Score (PT) 15  -KG     Highest level of mobility 4 --> Transferred to chair/commode  -KG     Row Name 11/01/22 1321 11/01/22 1004       Functional Assessment    Outcome Measure Options AM-PAC 6 Clicks Basic Mobility (PT)  -KG AM-PAC 6 Clicks Daily Activity (OT)  -KF          User Key  (r) = Recorded By, (t) = Taken By, (c) = Cosigned By    Initials Name Provider Type    Harleen Pizarro, PT Physical Therapist    Tiffany Benson, OT Occupational Therapist                             Physical Therapy Education     Title: PT OT SLP Therapies (In Progress)     Topic: Physical Therapy (In Progress)     Point: Mobility training (In Progress)     Learning Progress Summary           Patient Acceptance, E, NR by KG at 11/1/2022 0930    Acceptance, E, NR by KG at 10/31/2022 0907                   Point: Home exercise program (In Progress)     Learning Progress Summary           Patient Acceptance, E, NR by KG at 11/1/2022 0930                   Point: Body mechanics (In Progress)     Learning Progress Summary           Patient Acceptance, E, NR by KG at 11/1/2022 0930    Acceptance, E, NR by KG at 10/31/2022 0907                   Point: Precautions (In Progress)     Learning  Progress Summary           Patient Acceptance, E, NR by KG at 11/1/2022 0930    Acceptance, E, NR by KG at 10/31/2022 0907                               User Key     Initials Effective Dates Name Provider Type Discipline    KG 05/22/20 -  Harleen Das PT Physical Therapist PT              PT Recommendation and Plan  Planned Therapy Interventions (PT): balance training, bed mobility training, gait training, strengthening, transfer training  Plan of Care Reviewed With: patient  Progress: no change  Outcome Evaluation: Pt increased ambulation distance to 60ft with Seth x2 +1 and B UE support. Chair follow for safety. Pt required max cueing for upright posture and increased stride length. Pt impulsive with very poor safety awareness and minimal command following. Attempting to sit multiple times without warning. Pt refused additional ambulation despite max encouragement. Continue to progress as appropriate.     Time Calculation:    PT Charges     Row Name 11/01/22 0930             Time Calculation    Start Time 0930  -KG      PT Received On 11/01/22  -KG      PT Goal Re-Cert Due Date 11/10/22  -KG         Time Calculation- PT    Total Timed Code Minutes- PT 23 minute(s)  -KG         Timed Charges    60628 - PT Therapeutic Activity Minutes 23  -KG         Total Minutes    Timed Charges Total Minutes 23  -KG       Total Minutes 23  -KG            User Key  (r) = Recorded By, (t) = Taken By, (c) = Cosigned By    Initials Name Provider Type    KG Harleen Das PT Physical Therapist              Therapy Charges for Today     Code Description Service Date Service Provider Modifiers Qty    31770046397 HC PT EVAL MOD COMPLEXITY 4 10/31/2022 Harleen Das, PT GP 1    34855148483 HC PT THER SUPP EA 15 MIN 10/31/2022 Harleen Das, PT GP 3    67804614679 HC PT THERAPEUTIC ACT EA 15 MIN 11/1/2022 Harleen Das, PT GP 2    84118206986 HC PT THER SUPP EA 15 MIN 11/1/2022 Harleen Das  N, PT GP 2          PT G-Codes  Outcome Measure Options: AM-PAC 6 Clicks Basic Mobility (PT)  AM-PAC 6 Clicks Score (PT): 15  AM-PAC 6 Clicks Score (OT): 13    Cece Das, PT  11/1/2022

## 2022-11-01 NOTE — PLAN OF CARE
Goal Outcome Evaluation:      VSS. Remains drowsy. Tylenol given for pain. Remains on 3L NC. Adequate urine.

## 2022-11-01 NOTE — PROGRESS NOTES
Pulmonary/Critical Care Follow-up     LOS: 5 days   Patient Care Team:  Osiel Peña MD as PCP - General (Family Medicine)    Chief Complaint/reason: NSTEMI/medical management of issues postoperatively after CABG including, but not limited to: Respiratory, hemic, and electrolyte management.      Subjective      History reviewed and updated as indicated.    Interval History:     Patient is again a bit drowsy this morning.  She was refusing to ambulate however eventually acquiesced.  She ambulated in the robles with physical therapy.  She is mainly complaining about having to move around.  She has ongoing pain which is being managed.  No fevers or chills.     History taken from: Patient, staff    PMH/FH/Social History were reviewed and updated appropriately in the electronic medical record.     Review of Systems:    Review of 14 systems was completed with positives and pertinent negatives noted in the subjective section.  All other systems reviewed and are negative.         Objective     Vital Signs  Temp:  [96.9 °F (36.1 °C)-98.6 °F (37 °C)] 96.9 °F (36.1 °C)  Heart Rate:  [62-75] 71  Resp:  [12-26] 16  BP: (110-161)/(61-87) 129/74  10/31 0701 - 11/01 0700  In: 772.2 [P.O.:600; I.V.:172.2]  Out: 1295 [Urine:1295]  Body mass index is 32.07 kg/m².     IV drips:  niCARdipine  nitroglycerin  norepinephrine, Last Rate: Stopped (10/31/22 0200)       Physical Exam:     Constitutional:   Alert, in no acute distress   Head:   Normocephalic, atraumatic   Eyes:           Lids and lashes normal, conjunctivae and sclerae normal.  PER   ENMT:  Ears appear intact with no abnormalities noted     Lips normal.     Neck:  Trachea midline, no JVD   Lungs/Resp:    Normal effort, symmetric chest rise, no crepitus, diminished breath sounds at bases bilaterally bilaterally.               Heart/CV:   Regular rhythm and normal rate, no murmur   Abdomen/GI:    Soft, nontender, nondistended   :    Deferred   Extremities/MSK:  No clubbing  or cyanosis.  Trace bilateral lower extremity edema.     Pulses:  Pulses palpable and equal bilaterally   Skin:  No bleeding, bruising or rash.  Multiple tattoos.   Heme/Lymph:  No cervical or supraclavicular adenopathy.   Neurologic:    Psychiatric:    Moves all extremities with no obvious focal motor deficit.  Cranial nerves 2 - 12 grossly intact  Non-agitated, normal affect.    The above physical exam findings were reviewed and reflect my exam findings as of today's exam.   Electronically signed by:  Kerwin Bella MD  11/01/22  09:10 EDT      Results Review:     I reviewed the patient's new clinical results.   Results from last 7 days   Lab Units 11/01/22  0342 10/31/22  0308 10/30/22  0216 10/28/22  1621 10/28/22  0012   SODIUM mmol/L 137 139 141   < > 139   POTASSIUM mmol/L 3.8 4.1 4.6   < > 4.3   CHLORIDE mmol/L 100 104 107   < > 101   CO2 mmol/L 31.0* 28.0 27.0   < > 26.0   BUN mg/dL 19 19 22   < > 15   CREATININE mg/dL 0.67 0.75 0.81   < > 0.80   CALCIUM mg/dL 8.2* 7.9* 7.7*   < > 9.4   BILIRUBIN mg/dL  --   --   --   --  0.4   ALK PHOS U/L  --   --   --   --  110   ALT (SGPT) U/L  --   --   --   --  22   AST (SGOT) U/L  --   --   --   --  21   GLUCOSE mg/dL 144* 135* 158*   < > 382*    < > = values in this interval not displayed.     Results from last 7 days   Lab Units 11/01/22  0342 10/31/22  0308 10/30/22  0216   WBC 10*3/mm3 10.40 10.04 12.10*   HEMOGLOBIN g/dL 10.0* 9.6* 10.0*   HEMATOCRIT % 31.5* 30.4* 31.5*   PLATELETS 10*3/mm3 180 132* 152     Results from last 7 days   Lab Units 10/29/22  1418   PH, ARTERIAL pH units 7.400   PO2 ART mm Hg 62.7*   PCO2, ARTERIAL mm Hg 45.3*   HCO3 ART mmol/L 28.0*     Results from last 7 days   Lab Units 11/01/22  0342 10/31/22  0308 10/30/22  0216 10/29/22  1419   MAGNESIUM mg/dL 1.8 2.0 2.1 2.3   PHOSPHORUS mg/dL  --  3.2 3.9 3.4       I reviewed the patient's new imaging including images and reports.    Chest x-ray 10/31/2022: Right internal jugular catheter  in place with median sternotomy wires, cardiomegaly with moderate pulmonary congestion and small bilateral pleural effusions which are stable.  No definite pneumothorax.  Left-sided chest changes been removed.    Medication Review:   acetaminophen, 650 mg, Oral, Q8H  aspirin, 324 mg, Nasogastric, Daily  atorvastatin, 40 mg, Oral, Nightly  clopidogrel, 75 mg, Oral, Daily  docusate sodium, 100 mg, Nasogastric, BID   And  sennosides, 10 mL, Nasogastric, BID  escitalopram, 10 mg, Oral, Daily  gabapentin, 300 mg, Oral, Q8H  heparin (porcine), 5,000 Units, Subcutaneous, Q8H  insulin detemir, 5 Units, Subcutaneous, Daily  insulin lispro, 0-7 Units, Subcutaneous, 4x Daily With Meals & Nightly  Insulin Lispro, 2 Units, Subcutaneous, TID With Meals  ipratropium-albuterol, 3 mL, Nebulization, 4x Daily - RT  lisinopril, 10 mg, Oral, Q24H  metoprolol tartrate, 12.5 mg, Oral, Q12H  pantoprazole, 40 mg, Intravenous, Q AM  pharmacy consult - MTM, , Does not apply, Daily  sodium chloride, 10 mL, Intravenous, Q12H      niCARdipine, 5-15 mg/hr  nitroglycerin, 5-200 mcg/min  norepinephrine, 0.02-0.3 mcg/kg/min, Last Rate: Stopped (10/31/22 0200)        Assessment & Plan         I21.4 CABG 10/28/22    I25.10 MV CAD s/p stents     E11.9 T2DM     F19.90 Illicit drug use    I10 Hypertension    E78.5 Dyslipidemia    K21.9 GERD    Z98.84 Hx of laparoscopic gastric banding    Z72 Tobacco use    62 y.o. female admitted in transfer from [facility] on October 27 with initial complaint of exertional chest pain and found to have a non-STEMI.  Patient has known coronary artery disease and prior stents.  CTA chest at outside hospital was negative for pulmonary embolism.  Urine drug screen was positive for cocaine, amphetamine, and methamphetamine.  Outside hospital left heart catheterization showed multivessel coronary disease.  She was transferred for surgical revascularization.  She underwent CABG x3 by Dr. Diaz on  10/28/2022.  Patient was extubated on October 29, 2022.    Patient remained off of Precedex overnight.  She ambulated in the robles.  Oxygen requirement is down significantly to 2 L nasal cannula.    Plan:  1.  For coronary artery disease: Status post CABG.  Postoperative surgical management per CT surgery.  2.  For diabetes: Continue Levemir and correction insulin.  Continue low-dose prandial insulin.  3.  For depression: Continue Lexapro.  4.  Respiratory: Wean supplemental oxygen as tolerated.  Mobilize.  Encourage incentive spirometer use.  Diuresis as tolerated.  Additional doses ordered.  5.  For tobacco use: Encouraged smoking cessation.  6.  For agitation/illicit drug use: Holding Precedex.  Chemical dependency consult ongoing.    Electronically signed by:    Kerwin Bella MD  11/01/22  09:10 EDT      *. Please note that portions of this note were completed with [employer] - a voice recognition program.

## 2022-11-01 NOTE — PLAN OF CARE
Goal Outcome Evaluation:  Plan of Care Reviewed With: patient        Progress: improving  Outcome Evaluation: OT eval completed, Pt presents with deficits in strength, activity tolerance, safety awareness and compliance for ADL and functional transfer completion, recom IPOT d/c IRF pending progress

## 2022-11-01 NOTE — PROGRESS NOTES
CTS Progress Note       LOS: 5 days   Patient Care Team:  Osiel Peña MD as PCP - General (Family Medicine)    Chief Complaint: NSTEMI (non-ST elevated myocardial infarction) ([facility])    Vital Signs:  Temp:  [97.6 °F (36.4 °C)-98.6 °F (37 °C)] 97.6 °F (36.4 °C)  Heart Rate:  [62-76] 66  Resp:  [12-26] 12  BP: (106-161)/(49-87) 127/79    Physical Exam:       Results:   Results from last 7 days   Lab Units 11/01/22  0342   WBC 10*3/mm3 10.40   HEMOGLOBIN g/dL 10.0*   HEMATOCRIT % 31.5*   PLATELETS 10*3/mm3 180     Results from last 7 days   Lab Units 11/01/22  0342   SODIUM mmol/L 137   POTASSIUM mmol/L 3.8   CHLORIDE mmol/L 100   CO2 mmol/L 31.0*   BUN mg/dL 19   CREATININE mg/dL 0.67   GLUCOSE mg/dL 144*   CALCIUM mg/dL 8.2*           Imaging Results (Last 24 Hours)     ** No results found for the last 24 hours. **          Assessment      I21.4 CABG 10/28/22    I25.10 MV CAD s/p stents     E11.9 T2DM     F19.90 Illicit drug use    I10 Hypertension    E78.5 Dyslipidemia    K21.9 GERD    Z98.84 Hx of laparoscopic gastric banding    Z72 Tobacco use        Plan   Begin to reduce narcotic dosage  P2 Y 12 today    Please note that portions of this note were completed with a voice recognition program. Efforts were made to edit the dictations, but occasionally words are mistranscribed.    Bayron Diaz MD  11/01/22  06:50 EDT

## 2022-11-01 NOTE — CASE MANAGEMENT/SOCIAL WORK
Continued Stay Note  [facility]     Patient Name: Shantel Ríos  MRN: [id number]  Today's Date: 11/1/2022    Admit Date: 10/27/2022    Plan: ongoing   Discharge Plan     Row Name 11/01/22 1138       Plan    Plan ongoing    Plan Comments Therapy has recommended rehab at discharge. I called and spoke to her sister as patient is not very cooperative today.  She would like patient to be placed some where close to her as patient will be staying with her at discharge.  Referral faxed to [facility] and to [facility].    Final Discharge Disposition Code 30 - still a patient               Discharge Codes    No documentation.               Expected Discharge Date and Time     Expected Discharge Date Expected Discharge Time    Nov 6, 2022             Milana Alonso RN

## 2022-11-01 NOTE — PROGRESS NOTES
Pulmonary/Critical Care Follow-up     LOS: 4 days   Patient Care Team:  Osiel Peña MD as PCP - General (Family Medicine)    Chief Complaint/reason: NSTEMI/medical management of issues postoperatively after CABG including, but not limited to: Respiratory, hemic, and electrolyte management.      Subjective      History reviewed and updated as indicated.    Interval History:     Patient was on Precedex overnight and was drowsy this morning.  Precedex was stopped and she became more alert.  She is still fairly confused and at times moans.  She did ambulate in the robles with physical therapy.  No fevers or chills.    History taken from: Patient, staff    PMH/FH/Social History were reviewed and updated appropriately in the electronic medical record.     Review of Systems:    Review of 14 systems was completed with positives and pertinent negatives noted in the subjective section.  All other systems reviewed and are negative.         Objective     Vital Signs  Temp:  [97.9 °F (36.6 °C)-98.6 °F (37 °C)] 98.6 °F (37 °C)  Heart Rate:  [62-76] 73  Resp:  [12-26] 14  BP: (102-145)/(49-86) 145/76  10/30 0701 - 10/31 0700  In: 1884 [P.O.:120; I.V.:1614]  Out: 2330 [Urine:2130]  Body mass index is 32.07 kg/m².     IV drips:  dexmedetomidine, Last Rate: Stopped (10/31/22 0800)  niCARdipine  nitroglycerin  norepinephrine, Last Rate: Stopped (10/31/22 0200)       Physical Exam:     Constitutional:   Alert, in no acute distress   Head:   Normocephalic, atraumatic   Eyes:           Lids and lashes normal, conjunctivae and sclerae normal.  PER   ENMT:  Ears appear intact with no abnormalities noted     Lips normal.     Neck:  Trachea midline, no JVD   Lungs/Resp:    Normal effort, symmetric chest rise, no crepitus, diminished breath sounds at bases bilaterally bilaterally.               Heart/CV:   Regular rhythm and normal rate, no murmur   Abdomen/GI:    Soft, nontender, nondistended   :    Deferred   Extremities/MSK:  No  clubbing or cyanosis.  Trace bilateral lower extremity edema.     Pulses:  Pulses palpable and equal bilaterally   Skin:  No bleeding, bruising or rash   Heme/Lymph:  No cervical or supraclavicular adenopathy.   Neurologic:    Psychiatric:    Moves all extremities with no obvious focal motor deficit.  Cranial nerves 2 - 12 grossly intact  Non-agitated, normal affect.    The above physical exam findings were reviewed and reflect my exam findings as of today's exam.   Electronically signed by:  Kerwin Bella MD  10/31/22  20:50 EDT      Results Review:     I reviewed the patient's new clinical results.   Results from last 7 days   Lab Units 10/31/22  0308 10/30/22  0216 10/29/22  1419 10/28/22  1621 10/28/22  0012   SODIUM mmol/L 139 141 142   < > 139   POTASSIUM mmol/L 4.1 4.6 3.9   < > 4.3   CHLORIDE mmol/L 104 107 108*   < > 101   CO2 mmol/L 28.0 27.0 27.0   < > 26.0   BUN mg/dL 19 22 22   < > 15   CREATININE mg/dL 0.75 0.81 0.93   < > 0.80   CALCIUM mg/dL 7.9* 7.7* 8.1*   < > 9.4   BILIRUBIN mg/dL  --   --   --   --  0.4   ALK PHOS U/L  --   --   --   --  110   ALT (SGPT) U/L  --   --   --   --  22   AST (SGOT) U/L  --   --   --   --  21   GLUCOSE mg/dL 135* 158* 127*   < > 382*    < > = values in this interval not displayed.     Results from last 7 days   Lab Units 10/31/22  0308 10/30/22  0216 10/29/22  0209   WBC 10*3/mm3 10.04 12.10* 17.82*   HEMOGLOBIN g/dL 9.6* 10.0* 10.8*   HEMATOCRIT % 30.4* 31.5* 33.8*   PLATELETS 10*3/mm3 132* 152 237     Results from last 7 days   Lab Units 10/29/22  1418   PH, ARTERIAL pH units 7.400   PO2 ART mm Hg 62.7*   PCO2, ARTERIAL mm Hg 45.3*   HCO3 ART mmol/L 28.0*     Results from last 7 days   Lab Units 10/31/22  0308 10/30/22  0216 10/29/22  1419   MAGNESIUM mg/dL 2.0 2.1 2.3   PHOSPHORUS mg/dL 3.2 3.9 3.4       I reviewed the patient's new imaging including images and reports.    Chest x-ray 10/31/2022: Right internal jugular catheter in place with median sternotomy  wires, cardiomegaly with moderate pulmonary congestion and small bilateral pleural effusions which are stable.  No definite pneumothorax.  Left-sided chest changes been removed.    Medication Review:   acetaminophen, 650 mg, Oral, Q8H  aspirin, 324 mg, Nasogastric, Daily  atorvastatin, 40 mg, Oral, Nightly  clopidogrel, 75 mg, Oral, Daily  docusate sodium, 100 mg, Nasogastric, BID   And  sennosides, 10 mL, Nasogastric, BID  escitalopram, 10 mg, Oral, Daily  gabapentin, 300 mg, Oral, Q8H  heparin (porcine), 5,000 Units, Subcutaneous, Q8H  insulin detemir, 5 Units, Subcutaneous, Daily  insulin lispro, 0-7 Units, Subcutaneous, 4x Daily With Meals & Nightly  ipratropium-albuterol, 3 mL, Nebulization, 4x Daily - RT  metoprolol tartrate, 12.5 mg, Oral, Q12H  pantoprazole, 40 mg, Intravenous, Q AM  pharmacy consult - [place], , Does not apply, Daily  sodium chloride, 10 mL, Intravenous, Q12H      dexmedetomidine, 0.2-1.5 mcg/kg/hr, Last Rate: Stopped (10/31/22 0800)  niCARdipine, 5-15 mg/hr  nitroglycerin, 5-200 mcg/min  norepinephrine, 0.02-0.3 mcg/kg/min, Last Rate: Stopped (10/31/22 0200)        Assessment & Plan         I21.4 CABG 10/28/22    I25.10 MV CAD s/p stents     E11.9 T2DM     F19.90 Illicit drug use    I10 Hypertension    E78.5 Dyslipidemia    K21.9 GERD    Z98.84 Hx of laparoscopic gastric banding    Z72 Tobacco use    62 y.o. female admitted in transfer from [facility] on October 27 with initial complaint of exertional chest pain and found to have a non-STEMI.  Patient has known coronary artery disease and prior stents.  CTA chest at outside hospital was negative for pulmonary embolism.  Urine drug screen was positive for cocaine, amphetamine, and methamphetamine.  Outside hospital left heart catheterization showed multivessel coronary disease.  She was transferred for surgical revascularization.  She underwent CABG x3 by Dr. Diaz on 10/28/2022.  Patient was extubated on October 29,  2022.    Patient was on Precedex overnight.  She is now off of Precedex.  She ambulated in the robles.  Oxygen requirement is down significantly to 2 L nasal cannula.    Plan:  1.  For coronary artery disease: Status post CABG.  Postoperative surgical management per CT surgery.  2.  For diabetes: Continue Levemir and correction insulin.  Add low-dose prandial insulin.  3.  For depression: Continue Lexapro.  4.  Respiratory: Wean supplemental oxygen as tolerated.  Mobilize.  Encourage incentive spirometer use.  Diuresis as tolerated.  5.  For tobacco use: Encouraged smoking cessation.  6.  For agitation/illicit drug use: Wean off of Precedex.  Chemical dependency consult ongoing.    Electronically signed by:    Kerwin Bella MD  10/31/22  20:50 EDT      *. Please note that portions of this note were completed with [employer] - a voice recognition program.

## 2022-11-01 NOTE — SIGNIFICANT NOTE
11/01/22 1553   SLP Deferred Reason   SLP Deferred Reason Routine  (Spoke to RN - pt drowsy this PM. Will f/u for dysphagia tx as able/appropriate.)

## 2022-11-01 NOTE — THERAPY EVALUATION
Patient Name: Shantel Ríos  : 1960    MRN: [id number]                              Today's Date: 2022       Admit Date: 10/27/2022    Visit Dx:     ICD-10-CM ICD-9-CM   1. Oropharyngeal dysphagia  R13.12 787.22   2. 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 Myoview-([facility]. Dr. Floyd) EF44%. Inferolateral Infarct with Ischemia.   • CARDIOVASCULAR STRESS TEST  03/10/2015    L. Myoview:EF48% inferior ischemia, lateral infarctm ranexa added   • CATH LAB PROCEDURE      Cath-([facility])7 stents   • CATH LAB PROCEDURE      Cath-([facility]) 1 stent   • CATH LAB PROCEDURE  2014    Cath-(Dr. Floyd) 50-70%LAD. 30-50%Instent stenosis of RCA.   • CORONARY ARTERY BYPASS GRAFT N/A 10/28/2022    Procedure: MEDIAN STERNOTOMY, CORONARY ARTERY BYPASS GRAFTING X 3, UTILIZING THE LEFT INTERNAL MAMMARY ARTERY, ENDOSCOPIC VEIN HARVEST OF THE RIGHT GREATER SAPHENOUS VEIN;  Surgeon: Bayron Diaz MD;  [facility];  Service: Cardiothoracic;  Laterality: N/A;   • [facility]ED  [facility]    [facility] Mild depressed  [facility]   • ECHO - CONVERTED  03/10/2015    Echo: EF: 50-55%, RVSP 19mmHg, mild MR   • HYSTERECTOMY      Radical      General Information     Row Name 11/01/22 0952          OT Time and Intention    Document Type evaluation  -KF     Mode of Treatment occupational therapy  -     Row Name 11/01/22 0952          General Information    Patient Profile Reviewed yes  -KF     Existing Precautions/Restrictions cardiac;fall;oxygen therapy device and L/min;sternal;other (see comments)  impulsive  -KF     Barriers to Rehab cognitive status;previous functional deficit;medically complex;ineffective coping  -     Row Name 11/01/22 0952          Occupational Profile    Environmental Supports and Barriers (Occupational Profile) Pt with limited interaction with questions today, cont to assess for equipment needs- per chart homeless and Pt not providing much history at this time  -     Row Name 11/01/22 0952          Living Environment    People in Home alone;other (see comments)  homelessness per chart  -     Row Name 11/01/22 0952          Home Main Entrance    Number of Stairs, Main Entrance none  -KF     Row Name 11/01/22 0952          Stairs Within Home, Primary    Number of Stairs, Within Home, Primary none  -KF     Row Name 11/01/22 0952          Cognition    Orientation Status (Cognition) oriented to;person;place;verbal cues/prompts needed for orientation;time  -     Row Name 11/01/22 0952          Safety Issues, Functional Mobility    Safety Issues Affecting Function (Mobility) ability to follow commands;awareness of need for assistance;safety precaution awareness;safety precautions follow-through/compliance;insight into deficits/self-awareness;judgment  -KF     Impairments Affecting Function (Mobility) balance;cognition;coordination;endurance/activity tolerance;motor control;motor planning;pain;postural/trunk control;shortness of breath;strength  -KF     Cognitive Impairments, Mobility Safety/Performance  attention;awareness, need for assistance;insight into deficits/self-awareness;problem-solving/reasoning;safety precaution follow-through  -     Comment, Safety Issues/Impairments (Mobility) education provided on sternal precautions to reinforce with repositioning and functional transfers included within ADL tasks  -           User Key  (r) = Recorded By, (t) = Taken By, (c) = Cosigned By    Initials Name Provider Type     Tiffany Wahl OT Occupational Therapist                 Mobility/ADL's     Row Name 11/01/22 0955          Bed Mobility    Comment, (Bed Mobility) UIC  -     Row Name 11/01/22 0955          Transfers    Transfers sit-stand transfer;stand-sit transfer  -     Comment, (Transfers) STS x2 with episode of urinary incontinence due to potential urgency; cues throughout for sternal precaution management  -     Row Name 11/01/22 0955          Sit-Stand Transfer    Sit-Stand [place] (Transfers) minimum assist (75% patient effort);2 person assist;verbal cues  -     Assistive Device (Sit-Stand Transfers) other (see comments)  -     Comment, (Sit-Stand Transfer) BUE support  -     Row Name 11/01/22 0955          Stand-Sit Transfer    Stand-Sit [place] (Transfers) contact guard;1 person to manage equipment  -     Row Name 11/01/22 0955          Functional Mobility    Functional Mobility- Safety Issues supplemental O2;weight-shifting ability decreased;step length decreased  -     Functional Mobility- Comment deferred to PT  -     Row Name 11/01/22 0955          Activities of Daily Living    BADL Assessment/Intervention upper body dressing;lower body dressing;toileting;bathing  -     Row Name 11/01/22 0955          Upper Body Dressing Assessment/Training    [place] Level (Upper Body Dressing) don;doff;front opening garment;moderate assist (50% patient effort)  -     Position (Upper Body Dressing) unsupported sitting  -     Row Name 11/01/22 0955          Lower  Body Dressing Assessment/Training    [place] Level (Lower Body Dressing) don;doff;socks;dependent (less than 25% patient effort)  -KF     Position (Lower Body Dressing) unsupported sitting  -     Row Name 11/01/22 0955          Toileting Assessment/Training    [place] Level (Toileting) change pad/brief;dependent (less than 25% patient effort)  -KF     Position (Toileting) supported standing;unsupported sitting  -     Row Name 11/01/22 0955          Bathing Assessment/Intervention    [place] Level (Bathing) lower body;distal lower extremities/feet;dependent (less than 25% patient effort)  -KF     Position (Bathing) unsupported sitting  -           User Key  (r) = Recorded By, (t) = Taken By, (c) = Cosigned By    Initials Name Provider Type    Tiffany Benson, OT Occupational Therapist               Obj/Interventions     Row Name 11/01/22 0957          Sensory Assessment (Somatosensory)    Sensory Assessment (Somatosensory) unable/difficult to assess  Pt not able to respond appropriately at this time despite multiple attempts  -     Row Name 11/01/22 0957          Vision Assessment/Intervention    Visual Impairment/Limitations WFL;corrective lenses full-time  -     Row Name 11/01/22 0957          Range of Motion Comprehensive    General Range of Motion bilateral upper extremity ROM WFL  -     Comment, General Range of Motion within sternal precautions  -     Row Name 11/01/22 0957          Strength Comprehensive (MMT)    General Manual Muscle Testing (MMT) Assessment upper extremity strength deficits identified  -     Comment, General Manual Muscle Testing (MMT) Assessment unable to assess  due to poor command following of this task, deferred other MMT 2/2 sternal precautions  -     Row Name 11/01/22 0957          Balance    Balance Assessment sitting static balance;sitting dynamic balance;standing static balance;standing dynamic balance  -     Static Sitting Balance  contact guard  -KF     Dynamic Sitting Balance contact guard  -KF     Position, Sitting Balance unsupported;sitting in chair  -KF     Static Standing Balance contact guard;1 person to manage equipment;verbal cues  -KF     Dynamic Standing Balance minimal assist;2-person assist;verbal cues  -KF     Position/Device Used, Standing Balance supported  -KF     Balance Interventions standing;sit to stand;supported;minimal challenge;weight shifting activity;occupation based/functional task  -KF     Comment, Balance maintains balance with BUE supported intially then single UE support  -KF           User Key  (r) = Recorded By, (t) = Taken By, (c) = Cosigned By    Initials Name Provider Type    KF Tiffany Wahl, OT Occupational Therapist               Goals/Plan     Row Name 11/01/22 1003          Bed Mobility Goal 1 (OT)    Activity/Assistive Device (Bed Mobility Goal 1, OT) sit to supine/supine to sit  -KF     [place] Level/Cues Needed (Bed Mobility Goal 1, OT) minimum assist (75% or more patient effort)  -KF     Time Frame (Bed Mobility Goal 1, OT) long term goal (LTG);10 days  -KF     Strategies/Barriers (Bed Mobility Goal 1, OT) with sternal precaution management  -KF     Progress/Outcomes (Bed Mobility Goal 1, OT) new goal;goal ongoing  -KF     Row Name 11/01/22 1003          Transfer Goal 1 (OT)    Activity/Assistive Device (Transfer Goal 1, OT) toilet;walker, rolling  -KF     [place] Level/Cues Needed (Transfer Goal 1, OT) minimum assist (75% or more patient effort)  -KF     Time Frame (Transfer Goal 1, OT) long term goal (LTG);10 days  -KF     Progress/Outcome (Transfer Goal 1, OT) new goal;goal ongoing  -KF     Row Name 11/01/22 1003          Dressing Goal 1 (OT)    Activity/Device (Dressing Goal 1, OT) lower body dressing  socks/undergarment reacher PRN  -KF     [place]/Cues Needed (Dressing Goal 1, OT) minimum assist (75% or more patient effort)  -KF     Time Frame (Dressing Goal 1, OT) long  term goal (LTG);10 days  -     Progress/Outcome (Dressing Goal 1, OT) goal ongoing;new goal  -     Row Name 11/01/22 1003          Therapy Assessment/Plan (OT)    Planned Therapy Interventions (OT) activity tolerance training;adaptive equipment training;BADL retraining;occupation/activity based interventions;strengthening exercise;transfer/mobility retraining;functional balance retraining;ROM/therapeutic exercise;patient/caregiver education/training  -           User Key  (r) = Recorded By, (t) = Taken By, (c) = Cosigned By    Initials Name Provider Type    KF Tiffany Wahl, OT Occupational Therapist               Clinical Impression     Row Name 11/01/22 0959          Pain Assessment    Pretreatment Pain Rating 10/10  -KF     Posttreatment Pain Rating 10/10  -KF     Pain Location incisional  -     Pain Location - chest  -     Pre/Posttreatment Pain Comment RN notified  -     Pain Intervention(s) Repositioned;Ambulation/increased activity  -     Row Name 11/01/22 0959          Plan of Care Review    Plan of Care Reviewed With patient  -     Progress improving  -     Outcome Evaluation OT eval completed, Pt presents with deficits in strength, activity tolerance, safety awareness and compliance for ADL and functional transfer completion, recom IPOT d/c IRF pending progress  -     Row Name 11/01/22 0959          Therapy Assessment/Plan (OT)    Rehab Potential (OT) good, to achieve stated therapy goals  -     Criteria for Skilled Therapeutic Interventions Met (OT) yes;meets criteria;skilled treatment is necessary  -     Therapy Frequency (OT) daily  -     Row Name 11/01/22 0959          Therapy Plan Review/Discharge Plan (OT)    Equipment Needs Upon Discharge (OT) --  TBD  -     Anticipated Discharge Disposition (OT) inpatient rehabilitation facility  -     Row Name 11/01/22 0959          Vital Signs    Pre Systolic BP Rehab 115  RN cleared VSS  -     Pre Treatment Diastolic BP 68   -KF     Pre SpO2 (%) 95  -KF     O2 Delivery Pre Treatment supplemental O2  -KF     Post SpO2 (%) 94  -KF     O2 Delivery Post Treatment supplemental O2  -KF     Pre Patient Position Sitting  -KF     Intra Patient Position Standing  -KF     Post Patient Position Sitting  -KF     Rest Breaks  1  -KF     Row Name 11/01/22 0959          Positioning and Restraints    Pre-Treatment Position sitting in chair/recliner  -KF     Post Treatment Position chair  -KF     In Chair notified nsg;reclined;sitting;call light within reach;encouraged to call for assist;exit alarm on;waffle cushion;legs elevated  -KF           User Key  (r) = Recorded By, (t) = Taken By, (c) = Cosigned By    Initials Name Provider Type    Tiffany Benson OT Occupational Therapist               Outcome Measures     Row Name 11/01/22 1004          How much help from another is currently needed...    Putting on and taking off regular lower body clothing? 1  -KF     Bathing (including washing, rinsing, and drying) 2  -KF     Toileting (which includes using toilet bed pan or urinal) 2  -KF     Putting on and taking off regular upper body clothing 2  -KF     Taking care of personal grooming (such as brushing teeth) 3  -KF     Eating meals 3  -KF     AM-PAC 6 Clicks Score (OT) 13  -KF     Row Name 11/01/22 1004          Functional Assessment    Outcome Measure Options AM-PAC 6 Clicks Daily Activity (OT)  -KF           User Key  (r) = Recorded By, (t) = Taken By, (c) = Cosigned By    Initials Name Provider Type    Tiffany Benson OT Occupational Therapist                Occupational Therapy Education     Title: PT OT SLP Therapies (In Progress)     Topic: Occupational Therapy (In Progress)     Point: ADL training (Done)     Description:   Instruct learner(s) on proper safety adaptation and remediation techniques during self care or transfers.   Instruct in proper use of assistive devices.              Learning Progress Summary           Patient  Acceptance, E,D,TB, VU,DU,NR by  at 11/1/2022 1005                   Point: Home exercise program (Not Started)     Description:   Instruct learner(s) on appropriate technique for monitoring, assisting and/or progressing therapeutic exercises/activities.              Learner Progress:  Not documented in this visit.          Point: Precautions (Done)     Description:   Instruct learner(s) on prescribed precautions during self-care and functional transfers.              Learning Progress Summary           Patient Acceptance, E,D,TB, VU,DU,NR by  at 11/1/2022 1005                   Point: Body mechanics (Done)     Description:   Instruct learner(s) on proper positioning and spine alignment during self-care, functional mobility activities and/or exercises.              Learning Progress Summary           Patient Acceptance, E,D,TB, VU,DU,NR by  at 11/1/2022 1005                               User Key     Initials Effective Dates Name Provider Type Discipline     06/16/21 -  Tiffany Wahl OT Occupational Therapist OT              OT Recommendation and Plan  Planned Therapy Interventions (OT): activity tolerance training, adaptive equipment training, BADL retraining, occupation/activity based interventions, strengthening exercise, transfer/mobility retraining, functional balance retraining, ROM/therapeutic exercise, patient/caregiver education/training  Therapy Frequency (OT): daily  Plan of Care Review  Plan of Care Reviewed With: patient  Progress: improving  Outcome Evaluation: OT eval completed, Pt presents with deficits in strength, activity tolerance, safety awareness and compliance for ADL and functional transfer completion, recom IPOT d/c IRF pending progress     Time Calculation:    Time Calculation- OT     Row Name 11/01/22 0903             Time Calculation- OT    OT Start Time 0903 -      OT Received On 11/01/22  -      OT Goal Re-Cert Due Date 11/11/22  -         Timed Charges    31393 -  OT Therapeutic Activity Minutes 5  -KF      00826 - OT Self Care/Mgmt Minutes 5  -KF         Untimed Charges    OT Eval/Re-eval Minutes 39  -KF         Total Minutes    Timed Charges Total Minutes 10  -KF      Untimed Charges Total Minutes 39  -KF       Total Minutes 49  -KF            User Key  (r) = Recorded By, (t) = Taken By, (c) = Cosigned By    Initials Name Provider Type    KF Tiffany Wahl, OT Occupational Therapist              Therapy Charges for Today     Code Description Service Date Service Provider Modifiers Qty    80457105257 HC OT THERAPEUTIC ACT EA 15 MIN 11/1/2022 Tiffany Wahl OT GO 1    86726722998 HC OT THER SUPP EA 15 MIN 11/1/2022 Tiffany Wahl OT GO 2    72680664441 HC OT EVAL MOD COMPLEXITY 3 11/1/2022 Tiffany Wahl OT GO 1               Tiffany Wahl OT  11/1/2022

## 2022-11-02 LAB
ANION GAP SERPL CALCULATED.3IONS-SCNC: 9 MMOL/L (ref 5–15)
BUN SERPL-MCNC: 18 MG/DL (ref 8–23)
BUN/CREAT SERPL: 26.1 (ref 7–25)
CALCIUM SPEC-SCNC: 8.7 MG/DL (ref 8.6–10.5)
CHLORIDE SERPL-SCNC: 100 MMOL/L (ref 98–107)
CO2 SERPL-SCNC: 32 MMOL/L (ref 22–29)
CREAT SERPL-MCNC: 0.69 MG/DL (ref 0.57–1)
EGFRCR SERPLBLD CKD-EPI 2021: 98.9 ML/MIN/1.73
GLUCOSE BLDC GLUCOMTR-MCNC: 135 MG/DL (ref 70–130)
GLUCOSE BLDC GLUCOMTR-MCNC: 139 MG/DL (ref 70–130)
GLUCOSE BLDC GLUCOMTR-MCNC: 196 MG/DL (ref 70–130)
GLUCOSE BLDC GLUCOMTR-MCNC: 247 MG/DL (ref 70–130)
GLUCOSE SERPL-MCNC: 145 MG/DL (ref 65–99)
MAGNESIUM SERPL-MCNC: 2.1 MG/DL (ref 1.6–2.4)
PA ADP PRP-ACNC: 234 PRU
PHOSPHATE SERPL-MCNC: 3.7 MG/DL (ref 2.5–4.5)
POTASSIUM SERPL-SCNC: 3.4 MMOL/L (ref 3.5–5.2)
SODIUM SERPL-SCNC: 141 MMOL/L (ref 136–145)

## 2022-11-02 PROCEDURE — 92526 ORAL FUNCTION THERAPY: CPT | Performed by: SPEECH-LANGUAGE PATHOLOGIST

## 2022-11-02 PROCEDURE — 63710000001 INSULIN DETEMIR PER 5 UNITS: Performed by: INTERNAL MEDICINE

## 2022-11-02 PROCEDURE — 25010000002 HEPARIN (PORCINE) PER 1000 UNITS: Performed by: INTERNAL MEDICINE

## 2022-11-02 PROCEDURE — 25010000002 ONDANSETRON PER 1 MG: Performed by: PHYSICIAN ASSISTANT

## 2022-11-02 PROCEDURE — 80048 BASIC METABOLIC PNL TOTAL CA: CPT | Performed by: INTERNAL MEDICINE

## 2022-11-02 PROCEDURE — 99232 SBSQ HOSP IP/OBS MODERATE 35: CPT | Performed by: INTERNAL MEDICINE

## 2022-11-02 PROCEDURE — 94664 DEMO&/EVAL PT USE INHALER: CPT

## 2022-11-02 PROCEDURE — 85576 BLOOD PLATELET AGGREGATION: CPT | Performed by: PHYSICIAN ASSISTANT

## 2022-11-02 PROCEDURE — 82962 GLUCOSE BLOOD TEST: CPT

## 2022-11-02 PROCEDURE — 94761 N-INVAS EAR/PLS OXIMETRY MLT: CPT

## 2022-11-02 PROCEDURE — 94799 UNLISTED PULMONARY SVC/PX: CPT

## 2022-11-02 PROCEDURE — 63710000001 INSULIN LISPRO (HUMAN) PER 5 UNITS: Performed by: INTERNAL MEDICINE

## 2022-11-02 PROCEDURE — 99024 POSTOP FOLLOW-UP VISIT: CPT | Performed by: THORACIC SURGERY (CARDIOTHORACIC VASCULAR SURGERY)

## 2022-11-02 PROCEDURE — 84100 ASSAY OF PHOSPHORUS: CPT | Performed by: INTERNAL MEDICINE

## 2022-11-02 PROCEDURE — 83735 ASSAY OF MAGNESIUM: CPT | Performed by: INTERNAL MEDICINE

## 2022-11-02 RX ORDER — MORPHINE SULFATE 2 MG/ML
2 INJECTION, SOLUTION INTRAMUSCULAR; INTRAVENOUS
Status: DISCONTINUED | OUTPATIENT
Start: 2022-11-02 | End: 2022-11-04

## 2022-11-02 RX ORDER — CLOPIDOGREL BISULFATE 75 MG/1
300 TABLET ORAL ONCE
Status: COMPLETED | OUTPATIENT
Start: 2022-11-02 | End: 2022-11-02

## 2022-11-02 RX ADMIN — HYDROCODONE BITARTRATE AND ACETAMINOPHEN 1 TABLET: 5; 325 TABLET ORAL at 08:51

## 2022-11-02 RX ADMIN — HYDROCODONE BITARTRATE AND ACETAMINOPHEN 1 TABLET: 5; 325 TABLET ORAL at 19:34

## 2022-11-02 RX ADMIN — IPRATROPIUM BROMIDE AND ALBUTEROL SULFATE 3 ML: .5; 3 SOLUTION RESPIRATORY (INHALATION) at 07:54

## 2022-11-02 RX ADMIN — INSULIN LISPRO 3 UNITS: 100 INJECTION, SOLUTION INTRAVENOUS; SUBCUTANEOUS at 17:42

## 2022-11-02 RX ADMIN — ESCITALOPRAM OXALATE 10 MG: 10 TABLET ORAL at 10:37

## 2022-11-02 RX ADMIN — INSULIN LISPRO 2 UNITS: 100 INJECTION, SOLUTION INTRAVENOUS; SUBCUTANEOUS at 12:45

## 2022-11-02 RX ADMIN — POTASSIUM CHLORIDE 40 MEQ: 750 CAPSULE, EXTENDED RELEASE ORAL at 14:02

## 2022-11-02 RX ADMIN — ATORVASTATIN CALCIUM 40 MG: 40 TABLET, FILM COATED ORAL at 19:34

## 2022-11-02 RX ADMIN — Medication 10 ML: at 21:40

## 2022-11-02 RX ADMIN — METOPROLOL TARTRATE 12.5 MG: 25 TABLET, FILM COATED ORAL at 19:34

## 2022-11-02 RX ADMIN — SENNOSIDES AND DOCUSATE SODIUM 2 TABLET: 50; 8.6 TABLET ORAL at 10:33

## 2022-11-02 RX ADMIN — HEPARIN SODIUM 5000 UNITS: 5000 INJECTION INTRAVENOUS; SUBCUTANEOUS at 21:05

## 2022-11-02 RX ADMIN — INSULIN LISPRO 2 UNITS: 100 INJECTION, SOLUTION INTRAVENOUS; SUBCUTANEOUS at 17:41

## 2022-11-02 RX ADMIN — Medication 10 ML: at 10:39

## 2022-11-02 RX ADMIN — ACETAMINOPHEN 650 MG: 325 TABLET, FILM COATED ORAL at 05:14

## 2022-11-02 RX ADMIN — ONDANSETRON 4 MG: 2 INJECTION INTRAMUSCULAR; INTRAVENOUS at 08:38

## 2022-11-02 RX ADMIN — HEPARIN SODIUM 5000 UNITS: 5000 INJECTION INTRAVENOUS; SUBCUTANEOUS at 14:02

## 2022-11-02 RX ADMIN — SENNOSIDES AND DOCUSATE SODIUM 2 TABLET: 50; 8.6 TABLET ORAL at 19:34

## 2022-11-02 RX ADMIN — CLOPIDOGREL BISULFATE 75 MG: 75 TABLET ORAL at 10:37

## 2022-11-02 RX ADMIN — HEPARIN SODIUM 5000 UNITS: 5000 INJECTION INTRAVENOUS; SUBCUTANEOUS at 05:14

## 2022-11-02 RX ADMIN — LISINOPRIL 10 MG: 10 TABLET ORAL at 10:37

## 2022-11-02 RX ADMIN — ASPIRIN 81 MG CHEWABLE TABLET 324 MG: 81 TABLET CHEWABLE at 10:37

## 2022-11-02 RX ADMIN — CLOPIDOGREL BISULFATE 300 MG: 75 TABLET ORAL at 10:38

## 2022-11-02 RX ADMIN — INSULIN DETEMIR 5 UNITS: 100 INJECTION, SOLUTION SUBCUTANEOUS at 10:39

## 2022-11-02 RX ADMIN — ACETAMINOPHEN 650 MG: 325 TABLET, FILM COATED ORAL at 23:49

## 2022-11-02 RX ADMIN — METOPROLOL TARTRATE 12.5 MG: 25 TABLET, FILM COATED ORAL at 10:37

## 2022-11-02 RX ADMIN — IPRATROPIUM BROMIDE AND ALBUTEROL SULFATE 3 ML: .5; 3 SOLUTION RESPIRATORY (INHALATION) at 19:47

## 2022-11-02 RX ADMIN — PANTOPRAZOLE SODIUM 40 MG: 40 INJECTION, POWDER, FOR SOLUTION INTRAVENOUS at 05:14

## 2022-11-02 RX ADMIN — POTASSIUM CHLORIDE 40 MEQ: 750 CAPSULE, EXTENDED RELEASE ORAL at 05:14

## 2022-11-02 NOTE — DISCHARGE PLACEMENT REQUEST
"Case Management  - [facility] [phone]  Shantel Nance (62 y.o. Female)     Date of Birth   1960    Social Security Number       Address   [address] [place] [address]    Home Phone   [phone]    MRN   [id number]       Jainism   Unknown    Marital Status                               Admission Date   10/27/22    Admission Type   Urgent    Admitting Provider   Bayron Diaz MD    Attending Provider   Bayron Diaz MD    Department, Room/Bed   [facility]       Discharge Date       Discharge Disposition       Discharge Destination                               Attending Provider: Bayron Diaz MD    Allergies: Adhesive Tape, Contrast Dye, Hydrochlorothiazide, Keflex [Cephalexin], Penicillins, Sulfa Antibiotics    Isolation: None   Infection: None   Code Status: CPR    Ht: 160 cm (62.99\")   Wt: 83.7 kg (184 lb 8.4 oz)    Admission Cmt: None   Principal Problem: I21.4 CABG 10/28/22 [I21.4]                 Active Insurance as of 10/27/2022     Primary Coverage     Payor Plan Insurance Group Employer/Plan Group    ANTHEM MEDICARE REPLACEMENT ANTHEM MEDICARE ADVANTAGE [id number]     Payor Plan Address Payor Plan Phone Number Payor Plan Fax Number Effective Dates    [address] [phone]  5/1/2022 - None Entered    [facility] [id number]       Subscriber Name Subscriber Birth Date Member ID       SHANTEL NANCE 1960 [id number]           Secondary Coverage     Payor Plan Insurance Group Employer/Plan Group    [employer] [place]ICAID      Payor Plan Address Payor Plan Phone Number Payor Plan Fax Number Effective Dates    [address] [address] [phone]  6/30/2016 - None Entered    [facility] [id number]       Subscriber Name Subscriber Birth Date Member ID       SHANTEL NANCE 1960 [id number]                 Emergency Contacts      (Rel.) Home Phone Work Phone Mobile Phone    PatriciaMary Beth turk (Sister) [phone] -- [phone] "               History & Physical      Bayron Daiz MD at 10/27/22 2310                CTS H&P     Patient Care Team:  Osiel Peña MD as PCP - General (Family Medicine)  Referring MD:[facility]    Chief Complaint:  MVCAD    HPI  Patient is a 62 y.o. female with hypertension, hyperlipidemia, uncontrolled diabetes and known coronary artery disease.  Patient has had diagnosis of early onset coronary disease and has multiple stents placed in the past.  She also has COPD and a long history of smoking tobacco use.  Patient has been complaining of chest pain for the past 2 months.  On 10/26/2022 the patient's pain had progressed and was radiating down her arm and up her neck.  At that point she presented outside hospital and a cardiac work-up ensued.  She had a noted elevated D-dimer but a CTA of the chest was reportedly negative for PE.  Also reportedly the patient had a drug screen which tested positive for cocaine and amphetamines.  She was referred here for further evaluation and consideration for coronary bypass grafting.  Upon admission and questioning the patient was found to be groggy.  She is alert and oriented.  States she does use cocaine and meth when she needs to pick me up.  States the last time she used was the day prior to admission at [facility]    Review of Systems  Pertinent items are noted in HPI.    History  Past Medical History:   Diagnosis Date   • Anxiety    • CAD (coronary artery disease)    • Cancer of stomach (HCC)     Hx of stomach and uterine ca   • Depression    • Diabetes mellitus (HCC)     Type 2   • GERD (gastroesophageal reflux disease)    • History of radical hysterectomy    • Hyperlipidemia    • Hypertension    • MI (mitral incompetence)     Hx   • Pancreatitis     Chronic   • PTSD (post-traumatic stress disorder)    • Tumors     Stomach tumors     Past Surgical History:   Procedure Laterality Date   • CARDIOVASCULAR STRESS TEST  11/08/2014    L.  Myoview-([facility]. Dr. Floyd) EF44%. Inferolateral Infarct with Ischemia.   • CARDIOVASCULAR STRESS TEST  03/10/2015    L. Myoview:EF48% inferior ischemia, lateral infarctm ranexa added   • CATH LAB PROCEDURE  2000    Cath-(FI)7 stents   • CATH LAB PROCEDURE  2004    Cath-(Vand) 1 stent   • CATH LAB PROCEDURE  03/12/2014    Cath-(Dr. Floyd) 50-70%LAD. 30-50%Instent stenosis of RCA.   • ECHO - CONVERTED  11/07/2014    Echo-([facility]. ) Mild depressed EF   • ECHO - CONVERTED  03/10/2015    Echo: EF: 50-55%, RVSP 19mmHg, mild MR   • HYSTERECTOMY      Radical     Family History   Problem Relation Age of Onset   • Heart disease Mother    • Hypertension Mother    • Cancer Mother    • Parkinsonism Father    • Hypertension Father    • Heart attack Brother    • Stroke Brother    • Hypertension Sister    • Hypertension Brother    • Hypertension Sister      Social History     Tobacco Use   • Smoking status: Every Day     Packs/day: 1.00     Years: 47.00     Pack years: 47.00     Types: Cigarettes   • Smokeless tobacco: Never   • Tobacco comments:     9/3/15   Substance Use Topics   • Alcohol use: Yes     Comment: socially, drink one beer a month   • Drug use: No     Medications Prior to Admission   Medication Sig Dispense Refill Last Dose   • aspirin 81 MG EC tablet Take 81 mg by mouth daily.      • atorvastatin (LIPITOR) 10 MG tablet Take 10 mg by mouth daily.      • atorvastatin (LIPITOR) 20 MG tablet TAKE ONE TABLET BY MOUTH EVERY NIGHT AT BEDTIME 90 tablet 1    • escitalopram (LEXAPRO) 10 MG tablet Take 10 mg by mouth daily.      • gabapentin (NEURONTIN) 800 MG tablet Take 800 mg by mouth 3 (three) times a day.      • lisinopril (PRINIVIL,ZESTRIL) 10 MG tablet Take 10 mg by mouth daily.      • metoprolol tartrate (LOPRESSOR) 50 MG tablet Take 50 mg by mouth 2 (two) times a day.      • RANEXA 500 MG 12 hr tablet TAKE ONE TABLET BY MOUTH TWICE A  tablet 1      Allergies:  Adhesive tape, Contrast dye,  Hydrochlorothiazide, Keflex [cephalexin], Penicillins, and Sulfa antibiotics    Objective    Vital Signs  Temp:  [97.7 °F (36.5 °C)-98.6 °F (37 °C)] 97.7 °F (36.5 °C)  Heart Rate:  [71-96] 73  Resp:  [18] 18  BP: (115-133)/(71-89) 125/74    Physical Exam:  General Appearance: Well-developed well-nourished  Head: Atraumatic normocephalic  Neck: Supple  Lungs: Unlabored patient currently on 4 L nasal cannula with 94% oxygen saturation  Heart: Regular rate and rhythm  Abdomen: Obese, nontender no masses bruits  Extremities: Warm to the touch no significant peripheral edema  Pulses: Good palpable pedal pulses and radial artery pulses bilaterally  Skin: Warm and dry without rashes or lesions noted  Neurologic: Groggy and somnolent.  Moves all extremities.  Alert and oriented to person place          Data Review:        Assessment:    MVCAD with history of multiple stents    I25.10 MV CAD s/p stents     E11.9 T2DM     F19.90 Illicit drug use    I21.4 NSTEMI    I10 Hypertension    E78.5 Dyslipidemia    K21.9 GERD    Z98.84 Hx of laparoscopic gastric banding    Z72 Tobacco use  Tobacco use with heavy smoking history with  COPD  Uncontrolled diabetes with a hemoglobin A1c of 9.10      Plan:  Patient is a late arrival for admission and preoperative workup for tentative CABG 10- with Dr Diaz. Preop orders have been placed in Epic and I have asked to have the Cardiac films/images  be uploaded into Epic ASAP for Dr Diaz's viewing.   P2Y12, Echo, carotid duplex, CXR, PFTs and blood work ordered and Pending.  Recheck urine drug screen was positive for amphetamine, methamphetamine and cocaine    I agree with the above assessment.  I saw the patient earlier this morning and have also discussed her case with her cardiologist in [place] on 2 occasions.  She states that for nearly 1 year she cannot walk from her car to the grocery store without having chest pain.  She has significant coronary artery disease and is an  ongoing smoker.  She has no advanced directive on file and declines such.  She is positive on her urine drug screen for cocaine, amphetamines, and methamphetamines, I explained that her surgery has a risk of fatal stroke bleeding infection and death and she is agreeable to proceed but she is quite nervous.    MATIAS Hayden  10/28/22  08:05 EDT          Electronically signed by Bayron Diaz MD at 10/28/22 1028          Physical Therapy Notes (last 48 hours)      Harleen Das PT at 22 0930  Version 1 of 1       Goal Outcome Evaluation:  Plan of Care Reviewed With: patient        Progress: no change  Outcome Evaluation: Pt increased ambulation distance to 60ft with Seth x2 +1 and B UE support. Chair follow for safety. Pt required max cueing for upright posture and increased stride length. Pt impulsive with very poor safety awareness and minimal command following. Attempting to sit multiple times without warning. Pt refused additional ambulation despite max encouragement. Continue to progress as appropriate.    Electronically signed by Harleen Das PT at 22 1321     Harleen Das PT at 22 0930  Version 1 of 1         Patient Name: Shantel Ríos  : 1960    MRN: [id number]                              Today's Date: 2022       Admit Date: 10/27/2022    Visit Dx:     ICD-10-CM ICD-9-CM   1. Oropharyngeal dysphagia  R13.12 787.22   2. Encounter for examination for normal comparison and control in clinical research program  Z00.6 V70.7     Patient Active Problem List   Diagnosis   • I25.10 MV CAD s/p stents    • E11.9 T2DM    • F19.90 Illicit drug use   • I21.4 CABG 10/28/22   • I10 Hypertension   • E78.5 Dyslipidemia   • K21.9 GERD   • Z98.84 Hx of laparoscopic gastric banding   • Z72 Tobacco use     Past Medical History:   Diagnosis Date   • Anxiety    • CAD (coronary artery disease)    • Cancer of stomach (HCC)     Hx of stomach and uterine ca   •  Depression    • Diabetes mellitus (HCC)     Type 2   • GERD (gastroesophageal reflux disease)    • History of radical hysterectomy    • Hyperlipidemia    • Hypertension    • MI (mitral incompetence)     Hx   • Pancreatitis     Chronic   • PTSD (post-traumatic stress disorder)    • Tumors     Stomach tumors     Past Surgical History:   Procedure Laterality Date   • CARDIOVASCULAR STRESS TEST  11/08/2014    L. Myoview-([facility]. Dr. Floyd) EF44%. Inferolateral Infarct with Ischemia.   • CARDIOVASCULAR STRESS TEST  03/10/2015    L. Myoview:EF48% inferior ischemia, lateral infarctm ranexa added   • CATH LAB PROCEDURE  2000    Cath-(FI)7 stents   • CATH LAB PROCEDURE  2004    Cath-(Vand) 1 stent   • CATH LAB PROCEDURE  03/12/2014    Cath-(Dr. Floyd) 50-70%LAD. 30-50%Instent stenosis of RCA.   • CORONARY ARTERY BYPASS GRAFT N/A 10/28/2022    Procedure: MEDIAN STERNOTOMY, CORONARY ARTERY BYPASS GRAFTING X 3, UTILIZING THE LEFT INTERNAL MAMMARY ARTERY, ENDOSCOPIC VEIN HARVEST OF THE RIGHT GREATER SAPHENOUS VEIN;  Surgeon: Bayron Diaz MD;  Location: [facility];  Service: Cardiothoracic;  Laterality: N/A;   • ECHO - CONVERTED  11/07/2014    Echo-([facility]. ) Mild depressed EF   • ECHO - CONVERTED  03/10/2015    Echo: EF: 50-55%, RVSP 19mmHg, mild MR   • HYSTERECTOMY      Radical      General Information     Row Name 11/01/22 1143          Physical Therapy Time and Intention    Document Type therapy note (daily note)  -KG     Mode of Treatment physical therapy  -KG     Row Name 11/01/22 1143          General Information    Existing Precautions/Restrictions cardiac;fall;oxygen therapy device and L/min;sternal  -KG     Barriers to Rehab medically complex;previous functional deficit;cognitive status  -KG     Row Name 11/01/22 1143          Cognition    Orientation Status (Cognition) oriented to;person;place  -KG     Row Name 11/01/22 1143          Safety Issues, Functional Mobility    Safety Issues Affecting Function  (Mobility) ability to follow commands;at risk behavior observed;awareness of need for assistance;impulsivity;insight into deficits/self-awareness;safety precaution awareness;safety precautions follow-through/compliance  -KG     Impairments Affecting Function (Mobility) balance;cognition;coordination;endurance/activity tolerance;pain;postural/trunk control;shortness of breath;strength  -KG     Cognitive Impairments, Mobility Safety/Performance attention;awareness, need for assistance;impulsivity;insight into deficits/self-awareness;safety precaution awareness;safety precaution follow-through  -KG           User Key  (r) = Recorded By, (t) = Taken By, (c) = Cosigned By    Initials Name Provider Type    KG Harleen Das, PT Physical Therapist               Mobility     Row Name 11/01/22 1144          Bed Mobility    Bed Mobility sit-supine  -KG     Sit-Supine [place] (Bed Mobility) moderate assist (50% patient effort);verbal cues  -KG     Assistive Device (Bed Mobility) head of bed elevated  -KG     Comment, (Bed Mobility) VC's for sequencing and increased safety awareness. Pt impulsively attempting to return to supine position prior to instruction without assistance.  -KG     Row Name 11/01/22 1144          Transfers    Comment, (Transfers) Max cueing for sequencing and safe hand placement to maintain sternal precautions. Toilet transfer to [facility] with Seth. Pt non-compliant with sternal precautions despite cueing.  -KG     Row Name 11/01/22 1144          Sit-Stand Transfer    Sit-Stand [place] (Transfers) minimum assist (75% patient effort);verbal cues  -KG     Row Name 11/01/22 1144          Gait/Stairs (Locomotion)    Call[place] Level (Gait) minimum assist (75% patient effort);2 person assist;1 person to manage equipment;verbal cues  chair follow  -KG     Assistive Device (Gait) other (see comments)  B UE support on tele monitor  -KG     Distance in Feet (Gait) 60  -KG     Deviations/Abnormal  Patterns (Gait) bilateral deviations;richard decreased;festinating/shuffling;stride length decreased  -KG     Bilateral Gait Deviations forward flexed posture;heel strike decreased  -KG     Comment, (Gait/Stairs) Pt demonstrated step through gait pattern with slow richard and decreased step length. Pt exhibits adequate balance and stability throughout ambulation, but attempts to sit multiple times without warning. Pt very impulsive with poor safety awareness and little to no command following. Requires max encouragement to participate. Pt returned to room in chair after refusing to complete further mobility.  -KG           User Key  (r) = Recorded By, (t) = Taken By, (c) = Cosigned By    Initials Name Provider Type    KG Harleen Das, PT Physical Therapist               Obj/Interventions     Row Name 11/01/22 1316          Balance    Balance Assessment sitting static balance;standing static balance;standing dynamic balance  -KG     Static Sitting Balance contact guard  -KG     Position, Sitting Balance unsupported;sitting in chair  -KG     Static Standing Balance contact guard  -KG     Dynamic Standing Balance minimal assist  -KG     Position/Device Used, Standing Balance supported  -KG           User Key  (r) = Recorded By, (t) = Taken By, (c) = Cosigned By    Initials Name Provider Type    KG Harleen Das, PT Physical Therapist               Goals/Plan    No documentation.                Clinical Impression     Row Name 11/01/22 1317          Pain    Additional Documentation Pain Scale: FACES Pre/Post-Treatment (Group)  -KG     Row Name 11/01/22 1317          Pain Scale: FACES Pre/Post-Treatment    Pain: FACES Scale, Pretreatment 2-->hurts little bit  -KG     Posttreatment Pain Rating 4-->hurts little more  -KG     Pain Location incisional  -KG     Pain Location - chest  -KG     Row Name 11/01/22 1317          Plan of Care Review    Plan of Care Reviewed With patient  -KG     Progress no change   -KG     Outcome Evaluation Pt increased ambulation distance to 60ft with Seth x2 +1 and B UE support. Chair follow for safety. Pt required max cueing for upright posture and increased stride length. Pt impulsive with very poor safety awareness and minimal command following. Attempting to sit multiple times without warning. Pt refused additional ambulation despite max encouragement. Continue to progress as appropriate.  -KG     Row Name 11/01/22 1317          Vital Signs    Pre Systolic BP Rehab 115  -KG     Pre Treatment Diastolic BP 68  -KG     Pretreatment Heart Rate (beats/min) 61  -KG     Posttreatment Heart Rate (beats/min) 67  -KG     Pre SpO2 (%) 95  -KG     O2 Delivery Pre Treatment supplemental O2  -KG     Post SpO2 (%) 93  -KG     O2 Delivery Post Treatment supplemental O2  -KG     Pre Patient Position Sitting  -KG     Intra Patient Position Standing  -KG     Post Patient Position Supine  -KG     Row Name 11/01/22 1317          Positioning and Restraints    Pre-Treatment Position sitting in chair/recliner  -KG     Post Treatment Position bed  -KG     In Bed notified nsg;supine;call light within reach;encouraged to call for assist;side rails up x3;RUE elevated;LUE elevated  -KG           User Key  (r) = Recorded By, (t) = Taken By, (c) = Cosigned By    Initials Name Provider Type    KG Harleen Das, PT Physical Therapist               Outcome Measures     Row Name 11/01/22 1321          How much help from another person do you currently need...    Turning from your back to your side while in flat bed without using bedrails? 2  -KG     Moving from lying on back to sitting on the side of a flat bed without bedrails? 2  -KG     Moving to and from a bed to a chair (including a wheelchair)? 3  -KG     Standing up from a chair using your arms (e.g., wheelchair, bedside chair)? 3  -KG     Climbing 3-5 steps with a railing? 2  -KG     To walk in hospital room? 3  -KG     AM-PAC 6 Clicks Score (PT) 15  -KG      Highest level of mobility 4 --> Transferred to chair/commode  -KG     Row Name 11/01/22 1321 11/01/22 1004       Functional Assessment    Outcome Measure Options AM-PAC 6 Clicks Basic Mobility (PT)  -KG AM-PAC 6 Clicks Daily Activity (OT)  -KF          User Key  (r) = Recorded By, (t) = Taken By, (c) = Cosigned By    Initials Name Provider Type    KG Harleen Das, PT Physical Therapist    Tiffany Benson, OT Occupational Therapist                             Physical Therapy Education     Title: PT OT SLP Therapies (In Progress)     Topic: Physical Therapy (In Progress)     Point: Mobility training (In Progress)     Learning Progress Summary           Patient Acceptance, E, NR by KG at 11/1/2022 0930    Acceptance, E, NR by KG at 10/31/2022 0907                   Point: Home exercise program (In Progress)     Learning Progress Summary           Patient Acceptance, E, NR by KG at 11/1/2022 0930                   Point: Body mechanics (In Progress)     Learning Progress Summary           Patient Acceptance, E, NR by KG at 11/1/2022 0930    Acceptance, E, NR by KG at 10/31/2022 0907                   Point: Precautions (In Progress)     Learning Progress Summary           Patient Acceptance, E, NR by KG at 11/1/2022 0930    Acceptance, E, NR by KG at 10/31/2022 0907                               User Key     Initials Effective Dates Name Provider Type [facility]    KG 05/22/20 -  Harleen Das, PT Physical Therapist PT              PT Recommendation and Plan  Planned Therapy Interventions (PT): balance training, bed mobility training, gait training, strengthening, transfer training  Plan of Care Reviewed With: patient  Progress: no change  Outcome Evaluation: Pt increased ambulation distance to 60ft with Seth x2 +1 and B UE support. Chair follow for safety. Pt required max cueing for upright posture and increased stride length. Pt impulsive with very poor safety awareness and minimal command  following. Attempting to sit multiple times without warning. Pt refused additional ambulation despite max encouragement. Continue to progress as appropriate.     Time Calculation:    PT Charges     Row Name 22             Time Calculation    Start Time 0930  -KG      PT Received On 22  -KG      PT Goal Re-Cert Due Date 11/10/22  -KG         Time Calculation- PT    Total Timed Code Minutes- PT 23 minute(s)  -KG         Timed Charges    81923 - PT Therapeutic Activity Minutes 23  -KG         Total Minutes    Timed Charges Total Minutes 23  -KG       Total Minutes 23  -KG            User Key  (r) = Recorded By, (t) = Taken By, (c) = Cosigned By    Initials Name Provider Type    KG Harleen Das, PT Physical Therapist              Therapy Charges for Today     Code Description Service Date Service Provider Modifiers Qty    12048801302 HC PT EVAL MOD COMPLEXITY 4 10/31/2022 Harleen Das, PT GP 1    26109732946 HC PT THER SUPP EA 15 MIN 10/31/2022 Harleen Das, PT GP 3    58844614193 HC PT THERAPEUTIC ACT EA 15 MIN 2022 Harleen Das, PT GP 2    73292517573 HC PT THER SUPP EA 15 MIN 2022 Harleen Das, PT GP 2          PT G-Codes  Outcome Measure Options: AM-PAC 6 Clicks Basic Mobility (PT)  AM-PAC 6 Clicks Score (PT): 15  AM-PAC 6 Clicks Score (OT): 13    Cece Das PT  2022      Electronically signed by Harleen Das PT at 22 1323          Occupational Therapy Notes (last 48 hours)      Tiffany Wahl, OT at 22 0903          Patient Name: Shantel Ríos  : 1960    MRN: [id number]                              Today's Date: 2022       Admit Date: 10/27/2022    Visit Dx:     ICD-10-CM ICD-9-CM   1. Oropharyngeal dysphagia  R13.12 787.22   2. Encounter for examination for normal comparison and control in clinical research program  Z00.6 V70.7     Patient Active Problem List   Diagnosis   • I25.10 MV CAD  s/p stents    • E11.9 T2DM    • F19.90 Illicit drug use   • I21.4 CABG 10/28/22   • I10 Hypertension   • E78.5 Dyslipidemia   • K21.9 GERD   • Z98.84 Hx of laparoscopic gastric banding   • Z72 Tobacco use     Past Medical History:   Diagnosis Date   • Anxiety    • CAD (coronary artery disease)    • Cancer of stomach (HCC)     Hx of stomach and uterine ca   • Depression    • Diabetes mellitus (HCC)     Type 2   • GERD (gastroesophageal reflux disease)    • History of radical hysterectomy    • Hyperlipidemia    • Hypertension    • MI (mitral incompetence)     Hx   • Pancreatitis     Chronic   • PTSD (post-traumatic stress disorder)    • Tumors     Stomach tumors     Past Surgical History:   Procedure Laterality Date   • CARDIOVASCULAR STRESS TEST  11/08/2014    L. Myoview-([facility]. Dr. Floyd) EF44%. Inferolateral Infarct with Ischemia.   • CARDIOVASCULAR STRESS TEST  03/10/2015    L. Myoview:EF48% inferior ischemia, lateral infarctm ranexa added   • CATH LAB PROCEDURE  2000    Cath-(FI)7 stents   • CATH LAB PROCEDURE  2004    Cath-(Vand) 1 stent   • CATH LAB PROCEDURE  03/12/2014    Cath-(Dr. Floyd) 50-70%LAD. 30-50%Instent stenosis of RCA.   • CORONARY ARTERY BYPASS GRAFT N/A 10/28/2022    Procedure: MEDIAN STERNOTOMY, CORONARY ARTERY BYPASS GRAFTING X 3, UTILIZING THE LEFT INTERNAL MAMMARY ARTERY, ENDOSCOPIC VEIN HARVEST OF THE RIGHT GREATER SAPHENOUS VEIN;  Surgeon: Bayron Diaz MD;  Location: [facility];  Service: Cardiothoracic;  Laterality: N/A;   • ECHO - CONVERTED  11/07/2014    Echo-([facility]. ) Mild depressed EF   • ECHO - CONVERTED  03/10/2015    Echo: EF: 50-55%, RVSP 19mmHg, mild MR   • HYSTERECTOMY      Radical      General Information     Row Name 11/01/22 0952          OT Time and Intention    Document Type evaluation  -KF     Mode of Treatment occupational therapy  -KF     Row Name 11/01/22 0952          General Information    Patient Profile Reviewed yes  -KF     Existing  Precautions/Restrictions cardiac;fall;oxygen therapy device and L/min;sternal;other (see comments)  impulsive  -KF     Barriers to Rehab cognitive status;previous functional deficit;medically complex;ineffective coping  -     Row Name 11/01/22 0952          Occupational Profile    Environmental Supports and Barriers (Occupational Profile) Pt with limited interaction with questions today, cont to assess for equipment needs- per chart homeless and Pt not providing much history at this time  -     Row Name 11/01/22 0952          Living Environment    People in Home alone;other (see comments)  homelessness per chart  -     Row Name 11/01/22 0952          Home Main Entrance    Number of Stairs, Main Entrance none  -     Row Name 11/01/22 0952          Stairs Within Home, Primary    Number of Stairs, Within Home, Primary none  -     Row Name 11/01/22 0952          Cognition    Orientation Status (Cognition) oriented to;person;place;verbal cues/prompts needed for orientation;time  -     Row Name 11/01/22 0952          Safety Issues, Functional Mobility    Safety Issues Affecting Function (Mobility) ability to follow commands;awareness of need for assistance;safety precaution awareness;safety precautions follow-through/compliance;insight into deficits/self-awareness;judgment  -KF     Impairments Affecting Function (Mobility) balance;cognition;coordination;endurance/activity tolerance;motor control;motor planning;pain;postural/trunk control;shortness of breath;strength  -KF     Cognitive Impairments, Mobility Safety/Performance attention;awareness, need for assistance;insight into deficits/self-awareness;problem-solving/reasoning;safety precaution follow-through  -KF     Comment, Safety Issues/Impairments (Mobility) education provided on sternal precautions to reinforce with repositioning and functional transfers included within ADL tasks  -KF           User Key  (r) = Recorded By, (t) = Taken By, (c) = Cosigned  By    Initials Name Provider Type     Tiffany Wahl OT Occupational Therapist                 Mobility/ADL's     Row Name 11/01/22 0955          Bed Mobility    Comment, (Bed Mobility) UIC  -     Row Name 11/01/22 0955          Transfers    Transfers sit-stand transfer;stand-sit transfer  -     Comment, (Transfers) STS x2 with episode of urinary incontinence due to potential urgency; cues throughout for sternal precaution management  -     Row Name 11/01/22 0955          Sit-Stand Transfer    Sit-Stand [place] (Transfers) minimum assist (75% patient effort);2 person assist;verbal cues  -     Assistive Device (Sit-Stand Transfers) other (see comments)  -     Comment, (Sit-Stand Transfer) BUE support  -     Row Name 11/01/22 0955          Stand-Sit Transfer    Stand-Sit [place] (Transfers) contact guard;1 person to manage equipment  -     Row Name 11/01/22 0955          Functional Mobility    Functional Mobility- Safety Issues supplemental O2;weight-shifting ability decreased;step length decreased  -     Functional Mobility- Comment deferred to PT  -     Row Name 11/01/22 0955          Activities of Daily Living    BADL Assessment/Intervention upper body dressing;lower body dressing;toileting;bathing  -     Row Name 11/01/22 0955          Upper Body Dressing Assessment/Training    [place] Level (Upper Body Dressing) don;doff;front opening garment;moderate assist (50% patient effort)  -KF     Position (Upper Body Dressing) unsupported sitting  -     Row Name 11/01/22 0955          Lower Body Dressing Assessment/Training    [place] Level (Lower Body Dressing) don;doff;socks;dependent (less than 25% patient effort)  -KF     Position (Lower Body Dressing) unsupported sitting  -     Row Name 11/01/22 0955          Toileting Assessment/Training    [place] Level (Toileting) change pad/brief;dependent (less than 25% patient effort)  -KF     Position (Toileting)  supported standing;unsupported sitting  -     Row Name 11/01/22 0955          Bathing Assessment/Intervention    [place] Level (Bathing) lower body;distal lower extremities/feet;dependent (less than 25% patient effort)  -KF     Position (Bathing) unsupported sitting  -KF           User Key  (r) = Recorded By, (t) = Taken By, (c) = Cosigned By    Initials Name Provider Type    KF Tiffany Wahl, OT Occupational Therapist               Obj/Interventions     Row Name 11/01/22 0957          Sensory Assessment (Somatosensory)    Sensory Assessment (Somatosensory) unable/difficult to assess  Pt not able to respond appropriately at this time despite multiple attempts  -KF     Row Name 11/01/22 0957          Vision Assessment/Intervention    Visual Impairment/Limitations WFL;corrective lenses full-time  -     Row Name 11/01/22 0957          Range of Motion Comprehensive    General Range of Motion bilateral upper extremity ROM WFL  -KF     Comment, General Range of Motion within sternal precautions  -     Row Name 11/01/22 0957          Strength Comprehensive (MMT)    General Manual Muscle Testing (MMT) Assessment upper extremity strength deficits identified  -KF     Comment, General Manual Muscle Testing (MMT) Assessment unable to assess  due to poor command following of this task, deferred other MMT 2/2 sternal precautions  -     Row Name 11/01/22 0957          Balance    Balance Assessment sitting static balance;sitting dynamic balance;standing static balance;standing dynamic balance  -KF     Static Sitting Balance contact guard  -KF     Dynamic Sitting Balance contact guard  -KF     Position, Sitting Balance unsupported;sitting in chair  -KF     Static Standing Balance contact guard;1 person to manage equipment;verbal cues  -KF     Dynamic Standing Balance minimal assist;2-person assist;verbal cues  -KF     Position/Device Used, Standing Balance supported  -KF     Balance Interventions standing;sit  to stand;supported;minimal challenge;weight shifting activity;occupation based/functional task  -KF     Comment, Balance maintains balance with BUE supported intially then single UE support  -KF           User Key  (r) = Recorded By, (t) = Taken By, (c) = Cosigned By    Initials Name Provider Type    KF Tiffany Wahl, OT Occupational Therapist               Goals/Plan     Row Name 11/01/22 1003          Bed Mobility Goal 1 (OT)    Activity/Assistive Device (Bed Mobility Goal 1, OT) sit to supine/supine to sit  -KF     [place] Level/Cues Needed (Bed Mobility Goal 1, OT) minimum assist (75% or more patient effort)  -KF     Time Frame (Bed Mobility Goal 1, OT) long term goal (LTG);10 days  -KF     Strategies/Barriers (Bed Mobility Goal 1, OT) with sternal precaution management  -KF     Progress/Outcomes (Bed Mobility Goal 1, OT) new goal;goal ongoing  -KF     Row Name 11/01/22 1003          Transfer Goal 1 (OT)    Activity/Assistive Device (Transfer Goal 1, OT) toilet;walker, rolling  -KF     [place] Level/Cues Needed (Transfer Goal 1, OT) minimum assist (75% or more patient effort)  -KF     Time Frame (Transfer Goal 1, OT) long term goal (LTG);10 days  -KF     Progress/Outcome (Transfer Goal 1, OT) new goal;goal ongoing  -KF     Row Name 11/01/22 1003          Dressing Goal 1 (OT)    Activity/Device (Dressing Goal 1, OT) lower body dressing  socks/undergarment reacher PRN  -KF     [place]/Cues Needed (Dressing Goal 1, OT) minimum assist (75% or more patient effort)  -KF     Time Frame (Dressing Goal 1, OT) long term goal (LTG);10 days  -KF     Progress/Outcome (Dressing Goal 1, OT) goal ongoing;new goal  -KF     Row Name 11/01/22 1003          Therapy Assessment/Plan (OT)    Planned Therapy Interventions (OT) activity tolerance training;adaptive equipment training;BADL retraining;occupation/activity based interventions;strengthening exercise;transfer/mobility retraining;functional balance  retraining;ROM/therapeutic exercise;patient/caregiver education/training  -           User Key  (r) = Recorded By, (t) = Taken By, (c) = Cosigned By    Initials Name Provider Type    KF Tiffany Wahl, OT Occupational Therapist               Clinical Impression     Row Name 11/01/22 0959          Pain Assessment    Pretreatment Pain Rating 10/10  -KF     Posttreatment Pain Rating 10/10  -KF     Pain Location incisional  -KF     Pain Location - chest  -KF     Pre/Posttreatment Pain Comment RN notified  -KF     Pain Intervention(s) Repositioned;Ambulation/increased activity  -     Row Name 11/01/22 0959          Plan of Care Review    Plan of Care Reviewed With patient  -     Progress improving  -     Outcome Evaluation OT eval completed, Pt presents with deficits in strength, activity tolerance, safety awareness and compliance for ADL and functional transfer completion, recom IPOT d/c IRF pending progress  -     Row Name 11/01/22 0959          Therapy Assessment/Plan (OT)    Rehab Potential (OT) good, to achieve stated therapy goals  -     Criteria for Skilled Therapeutic Interventions Met (OT) yes;meets criteria;skilled treatment is necessary  -KF     Therapy Frequency (OT) daily  -     Row Name 11/01/22 0959          Therapy Plan Review/Discharge Plan (OT)    Equipment Needs Upon Discharge (OT) --  TBD  -KF     Anticipated Discharge Disposition (OT) inpatient rehabilitation facility  -     Row Name 11/01/22 0959          Vital Signs    Pre Systolic BP Rehab 115  RN cleared VSS  -KF     Pre Treatment Diastolic BP 68  -KF     Pre SpO2 (%) 95  -KF     O2 Delivery Pre Treatment supplemental O2  -KF     Post SpO2 (%) 94  -KF     O2 Delivery Post Treatment supplemental O2  -KF     Pre Patient Position Sitting  -KF     Intra Patient Position Standing  -KF     Post Patient Position Sitting  -KF     Rest Breaks  1  -KF     Row Name 11/01/22 0959          Positioning and Restraints    Pre-Treatment  Position sitting in chair/recliner  -KF     Post Treatment Position chair  -KF     In Chair notified nsg;reclined;sitting;call light within reach;encouraged to call for assist;exit alarm on;waffle cushion;legs elevated  -KF           User Key  (r) = Recorded By, (t) = Taken By, (c) = Cosigned By    Initials Name Provider Type    Tiffany Benson OT Occupational Therapist               Outcome Measures     Row Name 11/01/22 1004          How much help from another is currently needed...    Putting on and taking off regular lower body clothing? 1  -KF     Bathing (including washing, rinsing, and drying) 2  -KF     Toileting (which includes using toilet bed pan or urinal) 2  -KF     Putting on and taking off regular upper body clothing 2  -KF     Taking care of personal grooming (such as brushing teeth) 3  -KF     Eating meals 3  -KF     AM-PAC 6 Clicks Score (OT) 13  -KF     Row Name 11/01/22 1004          Functional Assessment    Outcome Measure Options AM-PAC 6 Clicks Daily Activity (OT)  -KF           User Key  (r) = Recorded By, (t) = Taken By, (c) = Cosigned By    Initials Name Provider Type    Tiffany Benson OT Occupational Therapist                Occupational Therapy Education     Title: PT OT SLP Therapies (In Progress)     Topic: Occupational Therapy (In Progress)     Point: ADL training (Done)     Description:   Instruct learner(s) on proper safety adaptation and remediation techniques during self care or transfers.   Instruct in proper use of assistive devices.              Learning Progress Summary           Patient Acceptance, E,D,TB, VU,DU,NR by  at 11/1/2022 1005                   Point: Home exercise program (Not Started)     Description:   Instruct learner(s) on appropriate technique for monitoring, assisting and/or progressing therapeutic exercises/activities.              Learner Progress:  Not documented in this visit.          Point: Precautions (Done)     Description:   Instruct  learner(s) on prescribed precautions during self-care and functional transfers.              Learning Progress Summary           Patient Acceptance, E,D,TB, VU,DU,NR by  at 11/1/2022 1005                   Point: Body mechanics (Done)     Description:   Instruct learner(s) on proper positioning and spine alignment during self-care, functional mobility activities and/or exercises.              Learning Progress Summary           Patient Acceptance, E,D,TB, VU,DU,NR by KF at 11/1/2022 1005                               User Key     Initials Effective Dates Name Provider Type Discipline     06/16/21 -  Tiffany Wahl OT Occupational Therapist OT              OT Recommendation and Plan  Planned Therapy Interventions (OT): activity tolerance training, adaptive equipment training, BADL retraining, occupation/activity based interventions, strengthening exercise, transfer/mobility retraining, functional balance retraining, ROM/therapeutic exercise, patient/caregiver education/training  Therapy Frequency (OT): daily  Plan of Care Review  Plan of Care Reviewed With: patient  Progress: improving  Outcome Evaluation: OT eval completed, Pt presents with deficits in strength, activity tolerance, safety awareness and compliance for ADL and functional transfer completion, recom IPOT d/c IRF pending progress     Time Calculation:    Time Calculation- OT     Row Name 11/01/22 0903             Time Calculation- OT    OT Start Time 0903  -KF      OT Received On 11/01/22  -KF      OT Goal Re-Cert Due Date 11/11/22  -KF         Timed Charges    75897 - OT Therapeutic Activity Minutes 5  -KF      51544 - OT Self Care/Mgmt Minutes 5  -KF         Untimed Charges    OT Eval/Re-eval Minutes 39  -KF         Total Minutes    Timed Charges Total Minutes 10  -KF      Untimed Charges Total Minutes 39  -KF       Total Minutes 49  -KF            User Key  (r) = Recorded By, (t) = Taken By, (c) = Cosigned By    Initials Name Provider Type     KF Tiffany Wahl OT Occupational Therapist              Therapy Charges for Today     Code Description Service Date Service Provider Modifiers Qty    02127737932  OT THERAPEUTIC ACT EA 15 MIN 11/1/2022 Tiffany Wahl OT GO 1    [id number]  OT THER SUPP EA 15 MIN 11/1/2022 Tiffany Wahl OT GO 2    [id number]  OT EVAL MOD COMPLEXITY 3 11/1/2022 Tiffany Wahl OT GO 1               Tiffany Wahl OT  11/1/2022    Electronically signed by Tiffany Wahl OT at 11/01/22 1007     Tiffany Wahl OT at 11/01/22 0903        Goal Outcome Evaluation:  Plan of Care Reviewed With: patient        Progress: improving  Outcome Evaluation: OT eval completed, Pt presents with deficits in strength, activity tolerance, safety awareness and compliance for ADL and functional transfer completion, recom IPOT d/c IRF pending progress    Electronically signed by Tiffany Wahl OT at 11/01/22 1005

## 2022-11-02 NOTE — PROGRESS NOTES
"[facility] Progress Note     LOS: 6 days   Patient Care Team:  Osiel Peña MD as PCP - General (Family Medicine)  PCP:  Osiel Peña MD    Chief Complaint: Coronary disease    SUBJECTIVE: Resting comfortably in bed.  Stable vitals.  No acute clinical issues overnight.      Review of Systems:   All systems have been reviewed and are negative with the exception of those mentioned above.      OBJECTIVE:    Vital Sign Min/Max for last 24 hours  Temp  Min: 97.7 °F (36.5 °C)  Max: 99 °F (37.2 °C)   BP  Min: 97/56  Max: 139/68   Pulse  Min: 60  Max: 76   Resp  Min: 12  Max: 20   SpO2  Min: 92 %  Max: 100 %   No data recorded   Weight  Min: 83.7 kg (184 lb 8.4 oz)  Max: 83.7 kg (184 lb 8.4 oz)     Flowsheet Rows    Flowsheet Row First Filed Value   Admission Height 160 cm (62.99\") Documented at 10/28/2022 0914   Admission Weight 82.2 kg (181 lb 4.8 oz) Documented at 10/27/2022 2240          Telemetry: Sinus rhythm      Intake/Output Summary (Last 24 hours) at 11/2/2022 0900  Last data filed at 11/2/2022 0800  Gross per 24 hour   Intake 346.53 ml   Output 900 ml   Net -553.47 ml     Intake & Output (last 3 days)       10/30 0701  10/31 0700 10/31 0701  11/01 0700 11/01 0701  11/02 0700 11/02 0701  11/03 0700    P.O. 120 600 240     I.V. (mL/kg) 1614 (19.7) 172.2 (2.1) 106.5 (1.3)     NG/       IV Piggyback        Total Intake(mL/kg) 1884 (22.9) 772.2 (9.4) 346.5 (4.1)     Urine (mL/kg/hr) 2130 (1.1) 1295 (0.7) 800 (0.4) 100 (0.6)    Emesis/NG output 150       Stool   0     Chest Tube 50       Total Output 2330 1295 800 100    Net -446 -522.8 -453.5 -100            Urine Unmeasured Occurrence   2 x     Stool Unmeasured Occurrence   1 x            Physical Exam:    General Appearance:   arousable, no acute distress.   Neck:   Supple, symmetrical, trachea midline.   Lungs:    Scattered mild expiratory wheezing, respirations unlabored   Chest Wall:   Sternal " wound dressed    Heart:    Regular rate and rhythm, S1 and S2 normal, no murmur, rub   or gallop, normal carotid impulse bilaterally without bruit.   Extremities:   Extremities normal, atraumatic, no cyanosis or edema   Pulses:   2+ and symmetric all extremities   Skin:   Skin color, texture, turgor normal, no rashes or lesions      LABS/DIAGNOSTIC DATA:  Results from last 7 days   Lab Units 11/01/22  0342 10/31/22  0308 10/30/22  0216   WBC 10*3/mm3 10.40 10.04 12.10*   HEMOGLOBIN g/dL 10.0* 9.6* 10.0*   HEMATOCRIT % 31.5* 30.4* 31.5*   PLATELETS 10*3/mm3 180 132* 152     No results found for: TROPONINT  Results from last 7 days   Lab Units 10/29/22  0209 10/28/22  1621 10/28/22  0012   INR  1.35* 1.37* 0.98   APTT seconds  --  28.2 26.8     Results from last 7 days   Lab Units 11/02/22  0334 11/01/22  0342 10/31/22  0308 10/28/22  1621 10/28/22  0012   SODIUM mmol/L 141 137 139   < > 139   POTASSIUM mmol/L 3.4* 3.8 4.1   < > 4.3   CHLORIDE mmol/L 100 100 104   < > 101   CO2 mmol/L 32.0* 31.0* 28.0   < > 26.0   BUN mg/dL 18 19 19   < > 15   CREATININE mg/dL 0.69 0.67 0.75   < > 0.80   CALCIUM mg/dL 8.7 8.2* 7.9*   < > 9.4   BILIRUBIN mg/dL  --   --   --   --  0.4   ALK PHOS U/L  --   --   --   --  110   ALT (SGPT) U/L  --   --   --   --  22   AST (SGOT) U/L  --   --   --   --  21   GLUCOSE mg/dL 145* 144* 135*   < > 382*    < > = values in this interval not displayed.     Results from last 7 days   Lab Units 10/28/22  0012   HEMOGLOBIN A1C % 9.10*     Results from last 7 days   Lab Units 10/28/22  0012   CHOLESTEROL mg/dL 192   TRIGLYCERIDES mg/dL 104   HDL CHOL mg/dL 40   LDL CHOL mg/dL 133*               Medication Review:   acetaminophen, 650 mg, Oral, Q8H  aspirin, 324 mg, Oral, Daily  atorvastatin, 40 mg, Oral, Nightly  clopidogrel, 300 mg, Oral, Once  clopidogrel, 75 mg, Oral, Daily  escitalopram, 10 mg, Oral, Daily  heparin (porcine), 5,000 Units, Subcutaneous, Q8H  insulin detemir, 5 Units, Subcutaneous,  Daily  insulin lispro, 0-7 Units, Subcutaneous, 4x Daily With Meals & Nightly  Insulin Lispro, 2 Units, Subcutaneous, TID With Meals  ipratropium-albuterol, 3 mL, Nebulization, 4x Daily - RT  lisinopril, 10 mg, Oral, Q24H  metoprolol tartrate, 12.5 mg, Oral, Q12H  pantoprazole, 40 mg, Intravenous, Q AM  pharmacy consult - [place], , Does not apply, Daily  senna-docusate sodium, 2 tablet, Oral, BID  sodium chloride, 10 mL, Intravenous, Q12H       niCARdipine, 5-15 mg/hr  nitroglycerin, 5-200 mcg/min  norepinephrine, 0.02-0.3 mcg/kg/min, Last Rate: Stopped (10/31/22 0200)         ASSESSMENT/PLAN:    I21.4 CABG 10/28/22    I25.10 MV CAD s/p stents     E11.9 T2DM     F19.90 Illicit drug use    I10 Hypertension    E78.5 Dyslipidemia    K21.9 GERD    Z98.84 Hx of laparoscopic gastric banding    Z72 Tobacco use        -Continue current cardiac medical therapy            [place] Mena III, MD   11/02/22  09:00 EDT
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
## 2022-11-02 NOTE — PROGRESS NOTES
Pulmonary/Critical Care Follow-up     LOS: 6 days   Patient Care Team:  Osiel Peña MD as PCP - General (Family Medicine)    Chief Complaint/reason: NSTEMI/medical management of issues postoperatively after CABG including, but not limited to: Respiratory, hemic, and electrolyte management.      Subjective      History reviewed and updated as indicated.    Interval History:     Patient is again a bit drowsy.  She apparently had a rough night and was agitated.  She apparently has been requesting to be moved to a different hospital.  Currently reports some nausea/retching.  She has ongoing pain which is being managed.  No fevers or chills.  Potassium being replaced.    History taken from: Patient, staff    PMH/FH/Social History were reviewed and updated appropriately in the electronic medical record.     Review of Systems:    Review of 14 systems was completed with positives and pertinent negatives noted in the subjective section.  All other systems reviewed and are negative.         Objective     Vital Signs  Temp:  [97.7 °F (36.5 °C)-99 °F (37.2 °C)] 97.7 °F (36.5 °C)  Heart Rate:  [63-77] 76  Resp:  [12-20] 18  BP: (101-139)/(52-87) 117/64  11/01 0701 - 11/02 0700  In: 346.5 [P.O.:240; I.V.:106.5]  Out: 800 [Urine:800]  Body mass index is 32.7 kg/m².     IV drips:  niCARdipine  nitroglycerin  norepinephrine, Last Rate: Stopped (10/31/22 0200)       Physical Exam:     Constitutional:   Alert, in no acute distress   Head:   Normocephalic, atraumatic   Eyes:           Lids and lashes normal, conjunctivae and sclerae normal.  PER   ENMT:  Ears appear intact with no abnormalities noted     Lips normal.     Neck:  Trachea midline, no JVD   Lungs/Resp:    Normal effort, symmetric chest rise, no crepitus, diminished breath sounds at bases bilaterally bilaterally.               Heart/CV:   Regular rhythm and normal rate, no murmur   Abdomen/GI:    Soft, nontender, nondistended   :    Deferred   Extremities/MSK:  No  clubbing or cyanosis.  Trace bilateral lower extremity edema.     Pulses:  Pulses palpable and equal bilaterally   Skin:  No bleeding, bruising or rash.  Multiple tattoos.   Heme/Lymph:  No cervical or supraclavicular adenopathy.   Neurologic:    Psychiatric:    Moves all extremities with no obvious focal motor deficit.  Cranial nerves 2 - 12 grossly intact  Non-agitated, normal affect.    The above physical exam findings were reviewed and reflect my exam findings as of today's exam.   Electronically signed by:  Kerwin Bella MD  11/02/22  12:30 EDT      Results Review:     I reviewed the patient's new clinical results.   Results from last 7 days   Lab Units 11/02/22  0334 11/01/22  0342 10/31/22  0308 10/28/22  1621 10/28/22  0012   SODIUM mmol/L 141 137 139   < > 139   POTASSIUM mmol/L 3.4* 3.8 4.1   < > 4.3   CHLORIDE mmol/L 100 100 104   < > 101   CO2 mmol/L 32.0* 31.0* 28.0   < > 26.0   BUN mg/dL 18 19 19   < > 15   CREATININE mg/dL 0.69 0.67 0.75   < > 0.80   CALCIUM mg/dL 8.7 8.2* 7.9*   < > 9.4   BILIRUBIN mg/dL  --   --   --   --  0.4   ALK PHOS U/L  --   --   --   --  110   ALT (SGPT) U/L  --   --   --   --  22   AST (SGOT) U/L  --   --   --   --  21   GLUCOSE mg/dL 145* 144* 135*   < > 382*    < > = values in this interval not displayed.     Results from last 7 days   Lab Units 11/01/22  0342 10/31/22  0308 10/30/22  0216   WBC 10*3/mm3 10.40 10.04 12.10*   HEMOGLOBIN g/dL 10.0* 9.6* 10.0*   HEMATOCRIT % 31.5* 30.4* 31.5*   PLATELETS 10*3/mm3 180 132* 152     Results from last 7 days   Lab Units 10/29/22  1418   PH, ARTERIAL pH units 7.400   PO2 ART mm Hg 62.7*   PCO2, ARTERIAL mm Hg 45.3*   HCO3 ART mmol/L 28.0*     Results from last 7 days   Lab Units 11/02/22  0334 11/01/22  0342 10/31/22  0308 10/30/22  0216   MAGNESIUM mg/dL 2.1 1.8 2.0 2.1   PHOSPHORUS mg/dL 3.7  --  3.2 3.9       I reviewed the patient's new imaging including images and reports.    Medication Review:   acetaminophen, 650 mg,  Oral, Q8H  aspirin, 324 mg, Oral, Daily  atorvastatin, 40 mg, Oral, Nightly  clopidogrel, 75 mg, Oral, Daily  escitalopram, 10 mg, Oral, Daily  heparin (porcine), 5,000 Units, Subcutaneous, Q8H  insulin detemir, 5 Units, Subcutaneous, Daily  insulin lispro, 0-7 Units, Subcutaneous, 4x Daily With Meals & Nightly  Insulin Lispro, 2 Units, Subcutaneous, TID With Meals  ipratropium-albuterol, 3 mL, Nebulization, 4x Daily - RT  lisinopril, 10 mg, Oral, Q24H  metoprolol tartrate, 12.5 mg, Oral, Q12H  pantoprazole, 40 mg, Intravenous, Q AM  pharmacy consult - MTM, , Does not apply, Daily  senna-docusate sodium, 2 tablet, Oral, BID  sodium chloride, 10 mL, Intravenous, Q12H      niCARdipine, 5-15 mg/hr  nitroglycerin, 5-200 mcg/min  norepinephrine, 0.02-0.3 mcg/kg/min, Last Rate: Stopped (10/31/22 0200)        Assessment & Plan         I21.4 CABG 10/28/22    I25.10 MV CAD s/p stents     E11.9 T2DM     F19.90 Illicit drug use    I10 Hypertension    E78.5 Dyslipidemia    K21.9 GERD    Z98.84 Hx of laparoscopic gastric banding    Z72 Tobacco use    62 y.o. female admitted in transfer from [facility] on October 27 with initial complaint of exertional chest pain and found to have a non-STEMI.  Patient has known coronary artery disease and prior stents.  CTA chest at outside hospital was negative for pulmonary embolism.  Urine drug screen was positive for cocaine, amphetamine, and methamphetamine.  Outside hospital left heart catheterization showed multivessel coronary disease.  She was transferred for surgical revascularization.  She underwent CABG x3 by Dr. Diaz on 10/28/2022.  Patient was extubated on October 29, 2022.    Patient had some agitation overnight.  She is requesting to be moved to a different hospital.  Adequate oxygenation on 3 L nasal cannula.    Plan:  1.  For coronary artery disease: Status post CABG.  Postoperative surgical management per CT surgery.  2.  For diabetes: Continue  Levemir and correction insulin.  Continue low-dose prandial insulin.  3.  For depression: Continue Lexapro.  4.  Respiratory: Wean supplemental oxygen as tolerated.  Mobilize.  Encourage incentive spirometer use.  Diuresis as tolerated.  Additional doses ordered.  5.  For tobacco use: Encouraged smoking cessation.  6.  For agitation/illicit drug use: Patient has remained off of Precedex.  Chemical dependency consult ongoing.    Electronically signed by:    Kerwin Bella MD  11/02/22  12:30 EDT      *. Please note that portions of this note were completed with [employer] - a voice recognition program.

## 2022-11-02 NOTE — THERAPY TREATMENT NOTE
Acute Care - Speech Language Pathology   Swallow Treatment Note  Kevin     Patient Name: Shantel Ríos  : 1960  MRN: [id number]  Today's Date: 2022               Admit Date: 10/27/2022    Visit Dx:     ICD-10-CM ICD-9-CM   1. Oropharyngeal dysphagia  R13.12 787.22   2. 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 Myoview-([facility]. Dr. Floyd) EF44%. Inferolateral Infarct with Ischemia.   • CARDIOVASCULAR STRESS TEST  03/10/2015    L. Myoview:EF48% inferior ischemia, lateral infarctm ranexa added   • CATH LAB PROCEDURE      Cath-(FI)7 stents   • CATH LAB PROCEDURE      Cath-([facility]) 1 stent   • CATH LAB PROCEDURE  2014    Cath-(Dr. Floyd) 50-70%LAD. 30-50%Instent stenosis of RCA.   • CORONARY ARTERY BYPASS GRAFT N/A 10/28/2022    Procedure: MEDIAN STERNOTOMY, CORONARY ARTERY BYPASS GRAFTING X 3, UTILIZING THE LEFT INTERNAL MAMMARY ARTERY, ENDOSCOPIC VEIN HARVEST OF THE RIGHT GREATER SAPHENOUS VEIN;  Surgeon: Bayron Daiz MD;  Location: [facility];  Service: Cardiothoracic;  Laterality: N/A;   • ECHO -  CONVERTED  11/07/2014    Echo-([facility]. ) Mild depressed EF   • ECHO - CONVERTED  03/10/2015    Echo: EF: 50-55%, RVSP 19mmHg, mild MR   • HYSTERECTOMY      Radical       SLP Recommendation and Plan     SLP Diet Recommendation: mechanical soft with no mixed consistencies, honey thick liquids (11/02/22 0955)  Recommended Precautions and Strategies: upright posture during/after eating, small bites of food and sips of liquid, no straw, general aspiration precautions, reflux precautions, assist with feeding, multiple swallows per bite of food, multiple swallows per sip of liquid (11/02/22 0955)  SLP Rec. for Method of Medication Administration: meds whole, meds crushed, with puree, as tolerated, meds via alternate route (11/02/22 0955)     Monitor for Signs of Aspiration: yes, notify SLP if any concerns (11/02/22 0955)  Recommended Diagnostics: FEES, other (see comments) (tentatively plan for 11/3) (11/02/22 0955)     Anticipated Discharge Disposition (SLP): unknown, anticipate therapy at next level of care (11/02/22 0955)     Therapy Frequency (Swallow): 5 days per week (11/02/22 0955)  Predicted Duration Therapy Intervention (Days): until discharge (11/02/22 0955)        Daily Summary of Progress (SLP): progress toward functional goals as expected (11/02/22 0955)               Treatment Assessment (SLP): improved, clinical signs of, aspiration (11/02/22 0955)  Treatment Assessment Comments (SLP): Participated in dysphagia tx. Throat clear x1 w/ sips of thin water. No other overt s/s of aspiraiton w/ trials of thin or honey thick liquids. Improvement in vocal quality as well as participation. Pt reports dislike for thickened liquids. Will tentatively plan to repeat FEES tomorrow (11/02/22 0955)  Plan for Continued Treatment (SLP): continue treatment per plan of care (11/02/22 0955)         Plan of Care Reviewed With: patient  Progress: improving      SWALLOW EVALUATION (last 72 hours)     SLP Adult Swallow  Evaluation     Row Name 11/02/22 0955                Rehab Evaluation    Document Type therapy note (daily note)  -       Subjective Information no complaints  -       Patient Observations cooperative;alert;agree to therapy  -       Patient/Family/Caregiver Comments/Observations no family present  -       Patient Effort good  -       Symptoms Noted During/After Treatment none  -          General Information    Patient Profile Reviewed --       Pertinent History Of Current Problem --       Current Method of Nutrition --       Precautions/Limitations, Vision --       Precautions/Limitations, Hearing --       Prior Level of Function-Communication --       Prior Level of Function-Swallowing --       Plans/Goals Discussed with --       Barriers to Rehab --       Patient's Goals for Discharge --          Pain    Additional Documentation Pain Scale: FACES Pre/Post-Treatment (Group)  -          Pain Scale: FACES Pre/Post-Treatment    Pain: FACES Scale, Pretreatment 0-->no hurt  -       Posttreatment Pain Rating 0-->no hurt  -          Fiberoptic Endoscopic Evaluation of Swallowing (FEES)    Risks/Benefits Reviewed --       Nasal Entry --       Scope serial number/identification --       Special Considerations --          Anatomy and Physiology    Anatomic Considerations --       Comment --       Velopharyngeal --       Base of Tongue --       Epiglottis --       Laryngeal Function Breathing --       Laryngeal Function Phonation --       Laryngeal Function to Breath Hold --       Secretion Rating Scale (Sam et al. 1996) --       Secretion Description --       Ice Chips --       Spontaneous Swallow --       Sensory --       Utensils Used --       Consistencies Trialed --          FEES Interpretation    Oral Phase --          Initiation of Pharyngeal Swallow    Initiation of Pharyngeal Swallow --       Pharyngeal Phase --       Aspiration During the Swallow --       Aspiration After the Swallow --        Depth of Penetration --       Response to Penetration --       No spontaneous response to penetration and --       Response to Aspiration --       No spontaneous response to aspiration with --       Rosenbek's Scale --       Residue --       Response to Residue --       Attempted Compensatory Maneuvers --       Response to Attempted Compensatory Maneuvers --       Successful Compensatory Maneuver Competency --       FEES Summary --          SLP Evaluation Clinical Impression    SLP Swallowing Diagnosis --       Functional Impact --       Rehab Potential/Prognosis, Swallowing --       Swallow Criteria for Skilled Therapeutic Interventions Met --          SLP Treatment Clinical Impressions    Treatment Assessment (SLP) improved;clinical signs of;aspiration  -       Treatment Assessment Comments (SLP) Participated in dysphagia tx. Throat clear x1 w/ sips of thin water. No other overt s/s of aspiraiton w/ trials of thin or honey thick liquids. Improvement in vocal quality as well as participation. Pt reports dislike for thickened liquids. Will tentatively plan to repeat FEES tomorrow  -       Daily Summary of Progress (SLP) progress toward functional goals as expected  -       Plan for Continued Treatment (SLP) continue treatment per plan of care  -       Care Plan Review evaluation/treatment results reviewed;care plan/treatment goals reviewed;patient/other agree to care plan  -          Recommendations    Therapy Frequency (Swallow) 5 days per week  -       Predicted Duration Therapy Intervention (Days) until discharge  -       SLP Diet Recommendation mechanical soft with no mixed consistencies;honey thick liquids  -       Recommended Diagnostics FEES;other (see comments)  tentatively plan for 11/3  -       Recommended Precautions and Strategies upright posture during/after eating;small bites of food and sips of liquid;no straw;general aspiration precautions;reflux precautions;assist with  feeding;multiple swallows per bite of food;multiple swallows per sip of liquid  -CJ       Oral Care Recommendations Oral Care BID/PRN;Suction toothbrush  -CJ       SLP Rec. for Method of Medication Administration meds whole;meds crushed;with puree;as tolerated;meds via alternate route  -CJ       Monitor for Signs of Aspiration yes;notify SLP if any concerns  -CJ       Anticipated Discharge Disposition (SLP) unknown;anticipate therapy at next level of care  -CJ             User Key  (r) = Recorded By, (t) = Taken By, (c) = Cosigned By    Initials Name Effective Dates    Betty Cunningham, MS CCC-SLP 06/16/21 -                 EDUCATION  The patient has been educated in the following areas:   Dysphagia (Swallowing Impairment) Oral Care/Hydration Modified Diet Instruction.        SLP GOALS     Row Name 11/02/22 0955          (LTG) Patient will demonstrate functional swallow for    Diet Texture (Demonstrate functional swallow) regular textures  -CJ     Liquid viscosity (Demonstrate functional swallow) thin liquids  -CJ     Casey (Demonstrate functional swallow) independently (over 90% accuracy)  -CJ     Time Frame (Demonstrate functional swallow) by discharge  -CJ     Progress/Outcomes (Demonstrate functional swallow) continuing progress toward goal  -CJ        (STG) Patient will tolerate trials of    Consistencies Trialed (Tolerate trials) pureed/ mashed textures;honey/ moderately thick liquids  -CJ     Desired Outcome (Tolerate trials) without signs/symptoms of aspiration;without signs of distress;with adequate oral prep/transit/clearance  -CJ     Casey (Tolerate trials) independently (over 90% accuracy)  -CJ     Time Frame (Tolerate trials) 1 week  -CJ     Progress/Outcomes (Tolerate trials) continuing progress toward goal  -CJ     Comment (Tolerate trials) no overt s/s of aspiration w/ honey  -CJ        (STG) Lingual Strengthening Goal 1 (SLP)    Activity (Lingual Strengthening Goal 1, SLP) increase  lingual tone/sensation/control/coordination/movement  -CJ     Increase Lingual Tone/Sensation/Control/Coordination/Movement swallow trials;lingual resistance exercises  -CJ     [place]/Accuracy (Lingual Strengthening Goal 1, SLP) with minimal cues (75-90% accuracy)  -CJ     Time Frame (Lingual Strengthening Goal 1, SLP) short term goal (STG)  -CJ     Progress/Outcomes (Lingual Strengthening Goal 1, SLP) continuing progress toward goal  -CJ        (STG) Pharyngeal Strengthening Exercise Goal 1 (SLP)    Activity (Pharyngeal Strengthening Goal 1, SLP) increase timing;increase superior movement of the hyolaryngeal complex;increase anterior movement of the hyolaryngeal complex;increase closure at entrance to airway/closure of airway at glottis;increase squeeze/positive pressure generation  -CJ     Increase Timing prepping - 3 second prep or suck swallow or 3-step swallow  -CJ     Increase Superior Movement of the Hyolaryngeal Complex effortful pitch glide (falsetto + pharyngeal squeeze)  -CJ     Increase Anterior Movement of the Hyolaryngeal Complex chin tuck against resistance (CTAR)  -CJ     Increase Closure at Entrance to Airway/Closure of Airway at Glottis supraglottic swallow;breath hold exercises  -CJ     Increase Squeeze/Positive Pressure Generation hard effortful swallow  -CJ     [place]/Accuracy (Pharyngeal Strengthening Goal 1, SLP) with moderate cues (50-74% accuracy)  -CJ     Time Frame (Pharyngeal Strengthening Goal 1, SLP) short term goal (STG)  -CJ     Progress/Outcomes (Pharyngeal Strengthening Goal 1, SLP) continuing progress toward goal  -CJ     Comment (Pharyngeal Strengthening Goal 1, SLP) pitch glide x1 x5 reps; breath hold x2 sets x5 reps; effortful x2 sets x5 reps; SG attempted x1 set x5 reps  -CJ           User Key  (r) = Recorded By, (t) = Taken By, (c) = Cosigned By    Initials Name Provider Type    Betty Cunningham MS CCC-SLP Speech and Language Pathologist                   Time  Calculation:    Time Calculation- SLP     Row Name 11/02/22 1148             Time Calculation- SLP    SLP Start Time 0955  -CJ      SLP Received On 11/02/22  -CJ         Untimed Charges    20346-TU Treatment Swallow Minutes 40  -CJ         Total Minutes    Untimed Charges Total Minutes 40  -CJ       Total Minutes 40  -CJ            User Key  (r) = Recorded By, (t) = Taken By, (c) = Cosigned By    Initials Name Provider Type     Betty Thomas, MS CCC-SLP Speech and Language Pathologist                Therapy Charges for Today     Code Description Service Date Service Provider Modifiers Qty    [id number]  ST TREATMENT SWALLOW 3 11/2/2022 Betty Thomas, MS CCC-SLP GN 1               Betty Thomas MS CCC-SLP  11/2/2022

## 2022-11-02 NOTE — PLAN OF CARE
Goal Outcome Evaluation:           Progress: improving  Outcome Evaluation: Pt more cooperative, ambulated 70ft with staff. Pt rested well. VSS, remains afebrile. Total uop was 375ml. Potassium replacement initiated for result of 3.4.

## 2022-11-02 NOTE — PLAN OF CARE
Goal Outcome Evaluation:  Plan of Care Reviewed With: (P) patient      VSS. Pt drowsy. Slept most of the day. Refused ambulation. K replaced. Lortab x1.

## 2022-11-02 NOTE — CONSULTS
Reviewed chart for diabetes education consult.  Noted GCS of 14.  Noted still confused per MD note 11/2.  Please re-consult diabetes education if appropriate.  Thank you for this referral.

## 2022-11-02 NOTE — CASE MANAGEMENT/SOCIAL WORK
Continued Stay Note  [facility]     Patient Name: Shantel Ríos  MRN: [id number]  Today's Date: 11/2/2022    Admit Date: 10/27/2022    Plan: ongoing   Discharge Plan     Row Name 11/02/22 1157       Plan    Plan ongoing    Patient/Family in Agreement with Plan yes    Plan Comments Referral to [facility] faxed to [phone] to [facility] who will deliver it to subacute rehab there.  Received consult from DR. Diaz to evaluate patients request to another hospital.  Physican would have to find an accepting physician at another hospital and when patient got a bed assignment at the other hospital  would arrange transport.    Final Discharge Disposition Code 30 - still a patient               Discharge Codes    No documentation.               Expected Discharge Date and Time     Expected Discharge Date Expected Discharge Time    Nov 6, 2022             Milana Alonso RN

## 2022-11-03 ENCOUNTER — ANCILLARY PROCEDURE (OUTPATIENT)
Dept: SPEECH THERAPY | Facility: HOSPITAL | Age: 62
End: 2022-11-03

## 2022-11-03 LAB
ANION GAP SERPL CALCULATED.3IONS-SCNC: 6 MMOL/L (ref 5–15)
BUN SERPL-MCNC: 15 MG/DL (ref 8–23)
BUN/CREAT SERPL: 26.8 (ref 7–25)
CALCIUM SPEC-SCNC: 8.3 MG/DL (ref 8.6–10.5)
CHLORIDE SERPL-SCNC: 102 MMOL/L (ref 98–107)
CO2 SERPL-SCNC: 33 MMOL/L (ref 22–29)
CREAT SERPL-MCNC: 0.56 MG/DL (ref 0.57–1)
DEPRECATED RDW RBC AUTO: 45.8 FL (ref 37–54)
EGFRCR SERPLBLD CKD-EPI 2021: 104 ML/MIN/1.73
ERYTHROCYTE [DISTWIDTH] IN BLOOD BY AUTOMATED COUNT: 13 % (ref 12.3–15.4)
GLUCOSE BLDC GLUCOMTR-MCNC: 134 MG/DL (ref 70–130)
GLUCOSE BLDC GLUCOMTR-MCNC: 158 MG/DL (ref 70–130)
GLUCOSE BLDC GLUCOMTR-MCNC: 180 MG/DL (ref 70–130)
GLUCOSE BLDC GLUCOMTR-MCNC: 194 MG/DL (ref 70–130)
GLUCOSE SERPL-MCNC: 150 MG/DL (ref 65–99)
HCT VFR BLD AUTO: 30.4 % (ref 34–46.6)
HGB BLD-MCNC: 9.5 G/DL (ref 12–15.9)
MAGNESIUM SERPL-MCNC: 1.8 MG/DL (ref 1.6–2.4)
MCH RBC QN AUTO: 30.3 PG (ref 26.6–33)
MCHC RBC AUTO-ENTMCNC: 31.3 G/DL (ref 31.5–35.7)
MCV RBC AUTO: 96.8 FL (ref 79–97)
PA ADP PRP-ACNC: 85 PRU
PHOSPHATE SERPL-MCNC: 3.3 MG/DL (ref 2.5–4.5)
PLATELET # BLD AUTO: 203 10*3/MM3 (ref 140–450)
PMV BLD AUTO: 10.5 FL (ref 6–12)
POTASSIUM SERPL-SCNC: 4.3 MMOL/L (ref 3.5–5.2)
RBC # BLD AUTO: 3.14 10*6/MM3 (ref 3.77–5.28)
SODIUM SERPL-SCNC: 141 MMOL/L (ref 136–145)
WBC NRBC COR # BLD: 7.64 10*3/MM3 (ref 3.4–10.8)

## 2022-11-03 PROCEDURE — 82962 GLUCOSE BLOOD TEST: CPT

## 2022-11-03 PROCEDURE — 94799 UNLISTED PULMONARY SVC/PX: CPT

## 2022-11-03 PROCEDURE — 92612 ENDOSCOPY SWALLOW (FEES) VID: CPT

## 2022-11-03 PROCEDURE — 63710000001 INSULIN LISPRO (HUMAN) PER 5 UNITS: Performed by: INTERNAL MEDICINE

## 2022-11-03 PROCEDURE — 99024 POSTOP FOLLOW-UP VISIT: CPT | Performed by: THORACIC SURGERY (CARDIOTHORACIC VASCULAR SURGERY)

## 2022-11-03 PROCEDURE — 83735 ASSAY OF MAGNESIUM: CPT | Performed by: INTERNAL MEDICINE

## 2022-11-03 PROCEDURE — 99232 SBSQ HOSP IP/OBS MODERATE 35: CPT | Performed by: INTERNAL MEDICINE

## 2022-11-03 PROCEDURE — 97530 THERAPEUTIC ACTIVITIES: CPT

## 2022-11-03 PROCEDURE — 25010000002 HEPARIN (PORCINE) PER 1000 UNITS: Performed by: INTERNAL MEDICINE

## 2022-11-03 PROCEDURE — 84100 ASSAY OF PHOSPHORUS: CPT | Performed by: INTERNAL MEDICINE

## 2022-11-03 PROCEDURE — 94761 N-INVAS EAR/PLS OXIMETRY MLT: CPT

## 2022-11-03 PROCEDURE — 85027 COMPLETE CBC AUTOMATED: CPT | Performed by: INTERNAL MEDICINE

## 2022-11-03 PROCEDURE — 80048 BASIC METABOLIC PNL TOTAL CA: CPT | Performed by: INTERNAL MEDICINE

## 2022-11-03 PROCEDURE — 63710000001 INSULIN DETEMIR PER 5 UNITS: Performed by: INTERNAL MEDICINE

## 2022-11-03 PROCEDURE — 0 MAGNESIUM SULFATE 4 GM/100ML SOLUTION: Performed by: PHYSICIAN ASSISTANT

## 2022-11-03 PROCEDURE — 85576 BLOOD PLATELET AGGREGATION: CPT | Performed by: PHYSICIAN ASSISTANT

## 2022-11-03 RX ADMIN — LISINOPRIL 10 MG: 10 TABLET ORAL at 09:16

## 2022-11-03 RX ADMIN — SENNOSIDES AND DOCUSATE SODIUM 2 TABLET: 50; 8.6 TABLET ORAL at 21:25

## 2022-11-03 RX ADMIN — PANTOPRAZOLE SODIUM 40 MG: 40 INJECTION, POWDER, FOR SOLUTION INTRAVENOUS at 05:08

## 2022-11-03 RX ADMIN — INSULIN LISPRO 2 UNITS: 100 INJECTION, SOLUTION INTRAVENOUS; SUBCUTANEOUS at 12:53

## 2022-11-03 RX ADMIN — METOPROLOL TARTRATE 12.5 MG: 25 TABLET, FILM COATED ORAL at 09:16

## 2022-11-03 RX ADMIN — CLOPIDOGREL BISULFATE 75 MG: 75 TABLET ORAL at 09:15

## 2022-11-03 RX ADMIN — ACETAMINOPHEN 650 MG: 325 TABLET, FILM COATED ORAL at 14:52

## 2022-11-03 RX ADMIN — HYDROCODONE BITARTRATE AND ACETAMINOPHEN 1 TABLET: 5; 325 TABLET ORAL at 21:37

## 2022-11-03 RX ADMIN — INSULIN DETEMIR 5 UNITS: 100 INJECTION, SOLUTION SUBCUTANEOUS at 09:15

## 2022-11-03 RX ADMIN — HYDROCODONE BITARTRATE AND ACETAMINOPHEN 1 TABLET: 5; 325 TABLET ORAL at 05:08

## 2022-11-03 RX ADMIN — HEPARIN SODIUM 5000 UNITS: 5000 INJECTION INTRAVENOUS; SUBCUTANEOUS at 21:25

## 2022-11-03 RX ADMIN — INSULIN LISPRO 2 UNITS: 100 INJECTION, SOLUTION INTRAVENOUS; SUBCUTANEOUS at 17:10

## 2022-11-03 RX ADMIN — HEPARIN SODIUM 5000 UNITS: 5000 INJECTION INTRAVENOUS; SUBCUTANEOUS at 05:08

## 2022-11-03 RX ADMIN — IPRATROPIUM BROMIDE AND ALBUTEROL SULFATE 3 ML: .5; 3 SOLUTION RESPIRATORY (INHALATION) at 07:46

## 2022-11-03 RX ADMIN — INSULIN LISPRO 2 UNITS: 100 INJECTION, SOLUTION INTRAVENOUS; SUBCUTANEOUS at 09:15

## 2022-11-03 RX ADMIN — Medication 10 ML: at 21:25

## 2022-11-03 RX ADMIN — ESCITALOPRAM OXALATE 10 MG: 10 TABLET ORAL at 09:16

## 2022-11-03 RX ADMIN — HEPARIN SODIUM 5000 UNITS: 5000 INJECTION INTRAVENOUS; SUBCUTANEOUS at 14:52

## 2022-11-03 RX ADMIN — SENNOSIDES AND DOCUSATE SODIUM 2 TABLET: 50; 8.6 TABLET ORAL at 09:15

## 2022-11-03 RX ADMIN — METOPROLOL TARTRATE 12.5 MG: 25 TABLET, FILM COATED ORAL at 21:25

## 2022-11-03 RX ADMIN — MAGNESIUM SULFATE HEPTAHYDRATE 4 G: 40 INJECTION, SOLUTION INTRAVENOUS at 03:34

## 2022-11-03 RX ADMIN — INSULIN LISPRO 2 UNITS: 100 INJECTION, SOLUTION INTRAVENOUS; SUBCUTANEOUS at 09:17

## 2022-11-03 RX ADMIN — ATORVASTATIN CALCIUM 40 MG: 40 TABLET, FILM COATED ORAL at 21:25

## 2022-11-03 RX ADMIN — ASPIRIN 81 MG CHEWABLE TABLET 324 MG: 81 TABLET CHEWABLE at 09:15

## 2022-11-03 RX ADMIN — ACETAMINOPHEN 650 MG: 325 TABLET, FILM COATED ORAL at 19:36

## 2022-11-03 RX ADMIN — Medication 10 ML: at 12:41

## 2022-11-03 RX ADMIN — ACETAMINOPHEN 650 MG: 325 TABLET, FILM COATED ORAL at 09:14

## 2022-11-03 NOTE — PLAN OF CARE
Goal Outcome Evaluation:      Patient is oriented and compliant with care today. Ambulated x 2, 70ft and 220ft.  She is up with assist x1.  Cordis d/c'd.  Pulmonary hygiene promoted.  NSR, 2LNC.  Fees completed and cleared for a Nectar thick, mechanical diet.

## 2022-11-03 NOTE — CASE MANAGEMENT/SOCIAL WORK
Continued Stay Note  [facility]     Patient Name: Shantel Ríos  MRN: [id number]  Today's Date: 11/3/2022    Admit Date: 10/27/2022    Plan: ongoing   Discharge Plan     Row Name 11/03/22 1047       Plan    Plan ongoing    Plan Comments I spoke with Montana at [facility] and he said they do not have any rehab beds available today but could possibly have some soon and to check back.  I spoke with Janey at [facility] and they are out of Network with [facility].    Final Discharge Disposition Code 30 - still a patient               Discharge Codes    No documentation.               Expected Discharge Date and Time     Expected Discharge Date Expected Discharge Time    Nov 6, 2022             Milana Alonso RN

## 2022-11-03 NOTE — CASE MANAGEMENT/SOCIAL WORK
Continued Stay Note  [facility]     Patient Name: Shantel Ríos  MRN: [id number]  Today's Date: 11/3/2022    Admit Date: 10/27/2022    Plan: update   Discharge Plan     Row Name 11/03/22 1101       Plan    Plan update    Plan Comments Referrals called to [facility] for [facility] and [facility].    Final Discharge Disposition Code 30 - still a patient    Row Name 11/03/22 1047       Plan    Plan ongoing    Plan Comments I spoke with Montana at [facility] and he said they do not have any rehab beds available today but could possibly have some soon and to check back.  I spoke with Janey at [facility] and they are out of Network with [facility]ast[facility].    Final Discharge Disposition Code 30 - still a patient               Discharge Codes    No documentation.               Expected Discharge Date and Time     Expected Discharge Date Expected Discharge Time    Nov 6, 2022             Milana Alonso RN

## 2022-11-03 NOTE — PROGRESS NOTES
CTS Progress Note       LOS: 7 days   Patient Care Team:  Osiel Peña MD as PCP - General (Family Medicine)    Chief Complaint: NSTEMI (non-ST elevated myocardial infarction) (HCC)    Vital Signs:  Temp:  [97.7 °F (36.5 °C)-98.9 °F (37.2 °C)] 98.9 °F (37.2 °C)  Heart Rate:  [63-79] 72  Resp:  [12-18] 14  BP: (100-143)/(58-94) 100/78    Physical Exam:       Results:   Results from last 7 days   Lab Units 11/03/22  0239   WBC 10*3/mm3 7.64   HEMOGLOBIN g/dL 9.5*   HEMATOCRIT % 30.4*   PLATELETS 10*3/mm3 203     Results from last 7 days   Lab Units 11/03/22  0239   SODIUM mmol/L 141   POTASSIUM mmol/L 4.3   CHLORIDE mmol/L 102   CO2 mmol/L 33.0*   BUN mg/dL 15   CREATININE mg/dL 0.56*   GLUCOSE mg/dL 150*   CALCIUM mg/dL 8.3*           Imaging Results (Last 24 Hours)     ** No results found for the last 24 hours. **          Assessment      I21.4 CABG 10/28/22    I25.10 MV CAD s/p stents     E11.9 T2DM     F19.90 Illicit drug use    I10 Hypertension    E78.5 Dyslipidemia    K21.9 GERD    Z98.84 Hx of laparoscopic gastric banding    Z72 Tobacco use    Not cooperating with recovery and refusing to walk.  However hemodynamics are stable    Plan   As above    Please note that portions of this note were completed with a voice recognition program. Efforts were made to edit the dictations, but occasionally words are mistranscribed.    Bayron Diaz MD  11/03/22  06:25 EDT

## 2022-11-03 NOTE — PROGRESS NOTES
"                    Clinical Nutrition       Patient Name: Shantel Ríos  YOB: 1960  MRN: [id number]  Date of Encounter: 22 14:01 EDT  Admission date: 10/27/2022      Reason for Visit   MDR, Follow-up protocol      EMR  Reviewed   Yes    Height: Height: 160 cm (62.99\")  Weight: Weight: 83.7 kg (184 lb 8.4 oz) (22 0500)  BMI: BMI (Calculated): 32.7    Problem:   CABG 10/28/22   Extubated 10/29/22   Dysphagia- Diet started per FEES (10/30)   Post surgery confusion and lethargy     Past Medical History     IMV CAD s/p stents     T2DM     Illicit drug use    Hypertension    Dyslipidemia    GERD    Hx of laparoscopic gastric banding    Tobacco use       Reported/Observed/Food/Nutrition Related - Comments   11/3) Patient has not been eating very well (decreased appetite, nausea, thickened liquids) this admission. Had FEES today - only change made was going from honey thick to nectar thick liquids. During visit patient falling asleep intermittently. While half asleep patient reports she has been eating the Magic Cups and likes them. RD spoke with SLP about safe alternate options for patient such as sandwiches and baked potato - patient allowed to have these per SLP. RD offered hamburger and baked potato for dinner tonight and patient accepted. Patient fell asleep and RD was unable to discuss trying a nectar thickened Boost (will go ahead and send one tomorrow morning for patient to try).    10/31) Diet started yesterday after FEES.  Still has NGT for medication, RN to remove today.  Lethargic, moaning this morning. Issues with confusion post CABG. She was able to get up and walk with PT.  Will need rehab at discharge.        Current Nutrition Prescription     Diet Dysphagia; IV - Mechanical Soft No Mixed Consistencies; Nectar / Syrup Thick; Other; single drinks; Cardiac, Consistent Carbohydrate    Oral Nutrition Supplement: Magic Cup 2x daily    Average Intake from [place]% x 6 " meals    Nutrition Diagnosis     10/31, 11/3  Problem Inadequate oral intake   Etiology Post CABG lethargy, decreased appetite, nausea, food preferences    Signs/Symptoms 29% x 6 meals   Status: ongoing    Actions     Follow treatment progress, Care plan reviewed, Advise alternate selection, Interview for preferences, Encourage intake, Supplement provided     -Communicated meal order to diet office.  -Continue Magic Cup 2x daily.  -Ordered Boost Glucose Control 1x daily.    Monitor Per Protocol      Mary Peterson RD  Time Spent: 20 min      '

## 2022-11-03 NOTE — CASE MANAGEMENT/SOCIAL WORK
Continued Stay Note  [facility]     Patient Name: Shantel Ríos  MRN: [id number]  Today's Date: 11/3/2022    Admit Date: 10/27/2022    Plan: ongoing   Discharge Plan     Row Name 11/03/22 0743       Plan    Plan ongoing    Plan Comments [facility] declined patient due to no beds available and a long list of internal patients with referrals waiting for bed.    Final Discharge Disposition Code 30 - still a patient               Discharge Codes    No documentation.               Expected Discharge Date and Time     Expected Discharge Date Expected Discharge Time    Nov 6, 2022             Milana Alonso RN

## 2022-11-03 NOTE — THERAPY TREATMENT NOTE
Patient Name: Shantel Ríos  : 1960    MRN: [id number]                              Today's Date: 11/3/2022       Admit Date: 10/27/2022    Visit Dx:     ICD-10-CM ICD-9-CM   1. Oropharyngeal dysphagia  R13.12 787.22   2. 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 Myoview-([facility]. Dr. Floyd) EF44%. Inferolateral Infarct with Ischemia.   • CARDIOVASCULAR STRESS TEST  03/10/2015    L. Myoview:EF48% inferior ischemia, lateral infarctm ranexa added   • CATH LAB PROCEDURE      Cath-(FI)7 stents   • CATH LAB PROCEDURE      Cath-(Vand) 1 stent   • CATH LAB PROCEDURE  2014    Cath-(Dr. Floyd) 50-70%LAD. 30-50%Instent stenosis of RCA.   • CORONARY ARTERY BYPASS GRAFT N/A 10/28/2022    Procedure: MEDIAN STERNOTOMY, CORONARY ARTERY BYPASS GRAFTING X 3, UTILIZING THE LEFT INTERNAL MAMMARY ARTERY, ENDOSCOPIC VEIN HARVEST OF THE RIGHT GREATER SAPHENOUS VEIN;  Surgeon: Bayron Diaz MD;  Location: [facility];  Service: Cardiothoracic;  Laterality: N/A;   • E[facility] - CONVERTED  2014    [facility] Mild depressed  EF   • ECHO - CONVERTED  03/10/2015    Echo: EF: 50-55%, RVSP 19mmHg, mild MR   • HYSTERECTOMY      Radical      General Information     Row Name 11/03/22 1419          Physical Therapy Time and Intention    Document Type therapy note (daily note)  -KG     Mode of Treatment physical therapy  -KG     Row Name 11/03/22 1419          General Information    Existing Precautions/Restrictions cardiac;fall;oxygen therapy device and L/min;sternal  -KG     Row Name 11/03/22 1419          Cognition    Orientation Status (Cognition) oriented to;person;place  -KG     Row Name 11/03/22 1419          Safety Issues, Functional Mobility    Safety Issues Affecting Function (Mobility) ability to follow commands;at risk behavior observed;awareness of need for assistance;impulsivity;insight into deficits/self-awareness;safety precaution awareness;safety precautions follow-through/compliance;sequencing abilities  -KG     Impairments Affecting Function (Mobility) balance;cognition;coordination;endurance/activity tolerance;motor control;motor planning;pain;postural/trunk control;shortness of breath;strength  -KG     Cognitive Impairments, Mobility Safety/Performance attention;awareness, need for assistance;impulsivity;insight into deficits/self-awareness;safety precaution awareness;safety precaution follow-through;sequencing abilities  -KG           User Key  (r) = Recorded By, (t) = Taken By, (c) = Cosigned By    Initials Name Provider Type    KG Harleen Das, PT Physical Therapist               Mobility     Row Name 11/03/22 1420          Bed Mobility    Bed Mobility scooting/bridging;sit-supine  -KG     Scooting/Bridging Haines (Bed Mobility) maximum assist (25% patient effort);2 person assist;verbal cues  -KG     Sit-Supine Haines (Bed Mobility) moderate assist (50% patient effort);2 person assist;verbal cues  -KG     Assistive Device (Bed Mobility) draw sheet;head of bed elevated  -KG     Comment, (Bed Mobility)  VC's for sequencing and safe hand placement to maintain sternal precautions. Pt required assistance at trunk and BLEs.  -KG     Row Name 11/03/22 1420          Transfers    Comment, (Transfers) Max cueing for sequencing and safe hand placement. Pt continues to be non-compliant with sternal precautions. Toilet transfer to [facility] with Seth.  -KG     Row Name 11/03/22 1420          Bed-Chair Transfer    Bed-Chair Wolfe (Transfers) contact guard;verbal cues  -KG     Row Name 11/03/22 1420          Sit-Stand Transfer    Sit-Stand Wolfe (Transfers) minimum assist (75% patient effort);verbal cues  -KG     Assistive Device (Sit-Stand Transfers) other (see comments)  -KG     Row Name 11/03/22 1420          Gait/Stairs (Locomotion)    Wolfe Level (Gait) minimum assist (75% patient effort);verbal cues  -KG     Assistive Device (Gait) other (see comments)  B UE support on tele monitor  -KG     Distance in Feet (Gait) 50  -KG     Deviations/Abnormal Patterns (Gait) bilateral deviations;richard decreased;festinating/shuffling;stride length decreased  -KG     Bilateral Gait Deviations forward flexed posture;heel strike decreased  -KG     Comment, (Gait/Stairs) Pt demonstrated step through gait pattern with slow richard and decreased step length. Pt demonstrated adequate balance and stability. VC's for upright posture and increased stride length. Pt with minimal motivation; refused additional ambulation despite max encouragement. Limited by c/o increased incisional pain.  -KG           User Key  (r) = Recorded By, (t) = Taken By, (c) = Cosigned By    Initials Name Provider Type    KG Harleen Das, PT Physical Therapist               Obj/Interventions     Row Name 11/03/22 1422          Balance    Balance Assessment sitting static balance;standing static balance;standing dynamic balance  -KG     Static Sitting Balance standby assist  -KG     Position, Sitting Balance unsupported;sitting in chair  -KG      Static Standing Balance contact guard  -KG     Dynamic Standing Balance minimal assist  -KG     Position/Device Used, Standing Balance supported  -KG           User Key  (r) = Recorded By, (t) = Taken By, (c) = Cosigned By    Initials Name Provider Type    KG Harleen Das N, PT Physical Therapist               Goals/Plan    No documentation.                Clinical Impression     [facility] Name 11/03/22 1423          Pain    Pretreatment Pain Rating 10/10  -KG     Posttreatment Pain Rating 10/10  -KG     Pain Location incisional  -KG     Pain Location - chest  -KG     Pain Intervention(s) Repositioned;Ambulation/increased activity  -KG     Row Name 11/03/22 1423          Plan of Care Review    Plan of Care Reviewed With patient  -KG     Progress no change  -KG     Outcome Evaluation Pt ambulated 50ft with Seth and B UE support on tele monitor. Frequent cues for upright posture with forward gaze and increased stride length. Pt declined additional ambulation despite max encouragement. Limited by c/o increased incisional pain. Continue to progress as appropriate.  -KG     [facility] Name 11/03/22 1423          Vital Signs    Pre Systolic BP Rehab 114  -KG     Pre Treatment Diastolic BP 71  -KG     Post Systolic BP Rehab 117  -KG     Post Treatment Diastolic BP 74  -KG     Pretreatment Heart Rate (beats/min) 82  -KG     Posttreatment Heart Rate (beats/min) 84  -KG     Pre SpO2 (%) 92  -KG     O2 Delivery Pre Treatment supplemental O2  -KG     Post SpO2 (%) 90  -KG     O2 Delivery Post Treatment supplemental O2  -KG     Pre Patient Position Sitting  -KG     Intra Patient Position Standing  -KG     Post Patient Position Supine  -KG     [facility] Name 11/03/22 1423          Positioning and Restraints    Pre-Treatment Position sitting in chair/recliner  -KG     Post Treatment Position bed  -KG     In Bed notified nsg;supine;call light within reach;encouraged to call for assist;exit alarm on;side rails up x3;RUE elevated;LUE elevated   -KG           User Key  (r) = Recorded By, (t) = Taken By, (c) = Cosigned By    Initials Name Provider Type    Harleen Pizarro PT Physical Therapist               Outcome Measures     Row Name 11/03/22 1424 11/03/22 0800       How much help from another person do you currently need...    Turning from your back to your side while in flat bed without using bedrails? 2  -KG 2  -JJ    Moving from lying on back to sitting on the side of a flat bed without bedrails? 2  -KG 2  -JJ    Moving to and from a bed to a chair (including a wheelchair)? 3  -KG 3  -JJ    Standing up from a chair using your arms (e.g., wheelchair, bedside chair)? 3  -KG 3  -JJ    Climbing 3-5 steps with a railing? 2  -KG 2  -JJ    To walk in hospital room? 3  -KG 3  -JJ    AM-PAC 6 Clicks Score (PT) 15  -KG 15  -JJ    Highest level of mobility 4 --> Transferred to chair/commode  -KG 4 --> Transferred to chair/commode  -JJ    Row Name 11/03/22 1424          Functional Assessment    Outcome Measure Options AM-PAC 6 Clicks Basic Mobility (PT)  -KG           User Key  (r) = Recorded By, (t) = Taken By, (c) = Cosigned By    Initials Name Provider Type    Stoney Tello, RN Registered Nurse    Harleen Pizarro PT Physical Therapist                             Physical Therapy Education     Title: PT OT SLP Therapies (In Progress)     Topic: Physical Therapy (In Progress)     Point: Mobility training (In Progress)     Learning Progress Summary           Patient Acceptance, E, NR by KG at 11/3/2022 0934    Acceptance, E, NR by KG at 11/1/2022 0930    Acceptance, E, NR by KG at 10/31/2022 0907                   Point: Home exercise program (In Progress)     Learning Progress Summary           Patient Acceptance, E, NR by KG at 11/3/2022 0934    Acceptance, E, NR by KG at 11/1/2022 0930                   Point: Body mechanics (In Progress)     Learning Progress Summary           Patient Acceptance, E, NR by KG at 11/3/2022 0934     Acceptance, E, NR by KG at 11/1/2022 0930    Acceptance, E, NR by KG at 10/31/2022 0907                   Point: Precautions (In Progress)     Learning Progress Summary           Patient Acceptance, E, NR by KG at 11/3/2022 0934    Acceptance, E, NR by KG at 11/1/2022 0930    Acceptance, E, NR by KG at 10/31/2022 0907                               User Key     Initials Effective Dates Name Provider Type Discipline    KG 05/22/20 -  Harleen Das PT Physical Therapist PT              PT Recommendation and Plan  Planned Therapy Interventions (PT): balance training, bed mobility training, gait training, strengthening, transfer training  Plan of Care Reviewed With: patient  Progress: no change  Outcome Evaluation: Pt ambulated 50ft with Seth and B UE support on tele monitor. Frequent cues for upright posture with forward gaze and increased stride length. Pt declined additional ambulation despite max encouragement. Limited by c/o increased incisional pain. Continue to progress as appropriate.     Time Calculation:    PT Charges     Row Name 11/03/22 0934             Time Calculation    Start Time 0934  -KG      PT Received On 11/03/22  -KG      PT Goal Re-Cert Due Date 11/10/22  -KG         Time Calculation- PT    Total Timed Code Minutes- PT 25 minute(s)  -KG         Timed Charges    47760 - PT Therapeutic Activity Minutes 25  -KG         Total Minutes    Timed Charges Total Minutes 25  -KG       Total Minutes 25  -KG            User Key  (r) = Recorded By, (t) = Taken By, (c) = Cosigned By    Initials Name Provider Type    KG Harleen Das PT Physical Therapist              Therapy Charges for Today     Code Description Service Date Service Provider Modifiers Qty    47871626596 HC PT THERAPEUTIC ACT EA 15 MIN 11/3/2022 Harleen Das, PT GP 2    36544927585 HC PT THER SUPP EA 15 MIN 11/3/2022 Harleen Das, PT GP 2          PT G-Codes  Outcome Measure Options: AM-PAC 6 Clicks Basic  Mobility (PT)  AM-PAC 6 Clicks Score (PT): 15  AM-PAC 6 Clicks Score (OT): 13    Cece Das, PT  11/3/2022

## 2022-11-03 NOTE — CASE MANAGEMENT/SOCIAL WORK
Continued Stay Note  [facility]     Patient Name: Shantel Ríos  MRN: [id number]  Today's Date: 11/3/2022    Admit Date: 10/27/2022    Plan: update   Discharge Plan     Row Name 11/03/22 1205       Plan    Plan update    Plan Comments [facility] out of Network with insurance.    Final Discharge Disposition Code 30 - still a patient    Row Name 11/03/22 1101       Plan    Plan update    Plan Comments Referrals called to [facility] for [facility] and [facility].    Final Discharge Disposition Code 30 - still a patient    Row Name 11/03/22 1047       Plan    Plan ongoing    Plan Comments I spoke with [place] at [facility] and he said they do not have any rehab beds available today but could possibly have some soon and to check back.  I spoke with Janey at [facility] and they are out of Network with Anastacia.    Final Discharge Disposition Code 30 - still a patient               Discharge Codes    No documentation.               Expected Discharge Date and Time     Expected Discharge Date Expected Discharge Time    Nov 6, 2022             Milana Alonso RN

## 2022-11-03 NOTE — PROGRESS NOTES
Pulmonary/Critical Care Follow-up     LOS: 7 days   Patient Care Team:  Osiel Peña MD as PCP - General (Family Medicine)    Chief Complaint/reason: NSTEMI/medical management of issues postoperatively after CABG including, but not limited to: Respiratory, hemic, and electrolyte management.      Subjective      History reviewed and updated as indicated.    Interval History:     Patient is a bit drowsy this morning.  Overall in better spirits today.  Feeling better with no nausea/vomiting.  Magnesium being replaced.  Had a bowel movement.  Plan for FEES today.      History taken from: Patient, staff    PMH/FH/Social History were reviewed and updated appropriately in the electronic medical record.     Review of Systems:    Review of 14 systems was completed with positives and pertinent negatives noted in the subjective section.  All other systems reviewed and are negative.         Objective     Vital Signs  Temp:  [97.7 °F (36.5 °C)-98.9 °F (37.2 °C)] 98.7 °F (37.1 °C)  Heart Rate:  [63-80] 79  Resp:  [12-20] 20  BP: (100-143)/(58-94) 122/68  11/02 0701 - 11/03 0700  In: 536 [P.O.:474; I.V.:62]  Out: 625 [Urine:625]  Body mass index is 32.7 kg/m².     IV drips:      Physical Exam:     Constitutional:   Alert, in no acute distress   Head:   Normocephalic, atraumatic   Eyes:           Lids and lashes normal, conjunctivae and sclerae normal.  PER   ENMT:  Ears appear intact with no abnormalities noted     Lips normal.     Neck:  Trachea midline, no JVD   Lungs/Resp:    Normal effort, symmetric chest rise, no crepitus, diminished breath sounds at bases bilaterally bilaterally.               Heart/CV:   Regular rhythm and normal rate, no murmur   Abdomen/GI:    Soft, nontender, nondistended   :    Deferred   Extremities/MSK:  No clubbing or cyanosis.  Trace bilateral lower extremity edema.     Pulses:  Pulses palpable and equal bilaterally   Skin:  No bleeding, bruising or rash.  Multiple tattoos.   Heme/Lymph:  No  cervical or supraclavicular adenopathy.   Neurologic:    Psychiatric:    Moves all extremities with no obvious focal motor deficit.  Cranial nerves 2 - 12 grossly intact  Non-agitated, normal affect.    The above physical exam findings were reviewed and reflect my exam findings as of today's exam.   Electronically signed by:  Kerwin Bella MD  11/03/22  11:57 EDT      Results Review:     I reviewed the patient's new clinical results.   Results from last 7 days   Lab Units 11/03/22  0239 11/02/22  0334 11/01/22  0342 10/28/22  1621 10/28/22  0012   SODIUM mmol/L 141 141 137   < > 139   POTASSIUM mmol/L 4.3 3.4* 3.8   < > 4.3   CHLORIDE mmol/L 102 100 100   < > 101   CO2 mmol/L 33.0* 32.0* 31.0*   < > 26.0   BUN mg/dL 15 18 19   < > 15   CREATININE mg/dL 0.56* 0.69 0.67   < > 0.80   CALCIUM mg/dL 8.3* 8.7 8.2*   < > 9.4   BILIRUBIN mg/dL  --   --   --   --  0.4   ALK PHOS U/L  --   --   --   --  110   ALT (SGPT) U/L  --   --   --   --  22   AST (SGOT) U/L  --   --   --   --  21   GLUCOSE mg/dL 150* 145* 144*   < > 382*    < > = values in this interval not displayed.     Results from last 7 days   Lab Units 11/03/22  0239 11/01/22  0342 10/31/22  0308   WBC 10*3/mm3 7.64 10.40 10.04   HEMOGLOBIN g/dL 9.5* 10.0* 9.6*   HEMATOCRIT % 30.4* 31.5* 30.4*   PLATELETS 10*3/mm3 203 180 132*     Results from last 7 days   Lab Units 10/29/22  1418   PH, ARTERIAL pH units 7.400   PO2 ART mm Hg 62.7*   PCO2, ARTERIAL mm Hg 45.3*   HCO3 ART mmol/L 28.0*     Results from last 7 days   Lab Units 11/03/22  0239 11/02/22  0334 11/01/22  0342 10/31/22  0308   MAGNESIUM mg/dL 1.8 2.1 1.8 2.0   PHOSPHORUS mg/dL 3.3 3.7  --  3.2       I reviewed the patient's new imaging including images and reports.    Medication Review:   acetaminophen, 650 mg, Oral, Q8H  aspirin, 324 mg, Oral, Daily  atorvastatin, 40 mg, Oral, Nightly  clopidogrel, 75 mg, Oral, Daily  escitalopram, 10 mg, Oral, Daily  heparin (porcine), 5,000 Units, Subcutaneous,  Q8H  insulin detemir, 5 Units, Subcutaneous, Daily  insulin lispro, 0-7 Units, Subcutaneous, 4x Daily With Meals & Nightly  Insulin Lispro, 2 Units, Subcutaneous, TID With Meals  ipratropium-albuterol, 3 mL, Nebulization, 4x Daily - RT  lisinopril, 10 mg, Oral, Q24H  metoprolol tartrate, 12.5 mg, Oral, Q12H  pantoprazole, 40 mg, Intravenous, Q AM  pharmacy consult - [place], , Does not apply, Daily  senna-docusate sodium, 2 tablet, Oral, BID  sodium chloride, 10 mL, Intravenous, Q12H           Assessment & Plan         I21.4 CABG 10/28/22    I25.10 MV CAD s/p stents     E11.9 T2DM     F19.90 Illicit drug use    I10 Hypertension    E78.5 Dyslipidemia    K21.9 GERD    Z98.84 Hx of laparoscopic gastric banding    Z72 Tobacco use    62 y.o. female admitted in transfer from [facility] on October 27 with initial complaint of exertional chest pain and found to have a non-STEMI.  Patient has known coronary artery disease and prior stents.  CTA chest at outside hospital was negative for pulmonary embolism.  Urine drug screen was positive for cocaine, amphetamine, and methamphetamine.  Outside hospital left heart catheterization showed multivessel coronary disease.  She was transferred for surgical revascularization.  She underwent CABG x3 by Dr. Diaz on 10/28/2022.  Patient was extubated on October 29, 2022.    Patient doing better today.  Plan for FEES today.  Adequate oxygenation on 2 L nasal cannula.  Blood sugars adequately controlled.    Plan:  1.  For coronary artery disease: Status post CABG.  Postoperative surgical management per CT surgery.  2.  For diabetes: Adequately controlled.  Continue Levemir and correction insulin.  Continue low-dose prandial insulin.  3.  For depression: Continue Lexapro.  4.  Respiratory: Wean supplemental oxygen as tolerated.  Mobilize.  Encourage incentive spirometer use.  Diuresis as tolerated.  Additional doses ordered.  5.  For tobacco use: Encouraged smoking  cessation.  6.  For agitation/illicit drug use: Patient has remained off of Precedex.  Chemical dependency consult ongoing.  7.  For electrolyte disturbance: Replace and monitor.    Electronically signed by:    Kerwin Bella MD  11/03/22  11:57 EDT      *. Please note that portions of this note were completed with [employer] - a voice recognition program.

## 2022-11-03 NOTE — THERAPY TREATMENT NOTE
Patient Name: Shantel Ríos  : 1960    MRN: [id number]                              Today's Date: 11/3/2022       Admit Date: 10/27/2022    Visit Dx:     ICD-10-CM ICD-9-CM   1. Oropharyngeal dysphagia  R13.12 787.22   2. 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 Myoview-([facility]. Dr. Floyd) EF44%. Inferolateral Infarct with Ischemia.   • CARDIOVASCULAR STRESS TEST  03/10/2015    L. Myoview:EF48% inferior ischemia, lateral infarctm ranexa added   • CATH LAB PROCEDURE      Cath-(FI)7 stents   • CATH LAB PROCEDURE      Cath-([facility]) 1 stent   • CATH LAB PROCEDURE  2014    Cath-(Dr. Floyd) 50-70%LAD. 30-50%Instent stenosis of RCA.   • CORONARY ARTERY BYPASS GRAFT N/A 10/28/2022    Procedure: MEDIAN STERNOTOMY, CORONARY ARTERY BYPASS GRAFTING X 3, UTILIZING THE LEFT INTERNAL MAMMARY ARTERY, ENDOSCOPIC VEIN HARVEST OF THE RIGHT GREATER SAPHENOUS VEIN;  Surgeon: Bayron Diaz MD;  Location: [facility];  Service: Cardiothoracic;  Laterality: N/A;   • [facility]  [facility]    [facility]  [facility]   • ECHO - CONVERTED  03/10/2015    Echo: EF: 50-55%, RVSP 19mmHg, mild MR   • HYSTERECTOMY      Radical      General Information     Row Name 11/03/22 1521          OT Time and Intention    Document Type therapy note (daily note)  -KF     Mode of Treatment occupational therapy  -     Row Name 11/03/22 1521          General Information    Patient Profile Reviewed yes  -KF     Existing Precautions/Restrictions cardiac;fall;oxygen therapy device and L/min;sternal  -KF     Barriers to Rehab medically complex;ineffective coping;previous functional deficit;cognitive status  -KF     Row Name 11/03/22 1521          Cognition    Orientation Status (Cognition) oriented x 3;verbal cues/prompts needed for orientation;situation  -     Row Name 11/03/22 1521          Safety Issues, Functional Mobility    Safety Issues Affecting Function (Mobility) insight into deficits/self-awareness;safety precaution awareness;awareness of need for assistance;judgment;problem-solving  -KF     Impairments Affecting Function (Mobility) balance;cognition;coordination;endurance/activity tolerance;motor control;motor planning;pain;postural/trunk control;shortness of breath;strength  -KF     Cognitive Impairments, Mobility Safety/Performance awareness, need for assistance;problem-solving/reasoning;insight into deficits/self-awareness;judgment;safety precaution awareness;safety precaution follow-through  -KF           User Key  (r) = Recorded By, (t) = Taken By, (c) = Cosigned By    Initials Name Provider Type    KF Tiffany Wahl, OT Occupational Therapist                 Mobility/ADL's     Row Name 11/03/22 1522          Bed Mobility    Bed Mobility scooting/bridging;sidelying-sit;sit-sidelying;rolling left  -KF     Rolling Left [place] (Bed Mobility) minimum assist (75% patient effort);verbal cues  -KF     Scooting/Bridging [place] (Bed Mobility) moderate assist (50% patient effort);verbal cues;nonverbal cues (demo/gesture)   -KF     Sidelying-Sit [place] (Bed Mobility) moderate assist (50% patient effort);verbal cues  -KF     Sit-Sidelying [place] (Bed Mobility) minimum assist (75% patient effort);verbal cues  -KF     Bed Mobility, Safety Issues cognitive deficits limit understanding;decreased use of arms for pushing/pulling  -KF     Assistive Device (Bed Mobility) head of bed elevated;draw sheet  -KF     Comment, (Bed Mobility) cues for sequencing and progress with bed mobility while maintaining sternal precautions with mod VC's  -KF     Row Name 11/03/22 1522          Transfers    Comment, (Transfers) Pt reports nausea upon sitting and limited sitting tolerance at this time, unable to progress towards STS at this time; Pt declined transfer to chair at this time  -KF           User Key  (r) = Recorded By, (t) = Taken By, (c) = Cosigned By    Initials Name Provider Type    Tiffany Benson, OT Occupational Therapist               Obj/Interventions     Row Name 11/03/22 1524          Shoulder (Therapeutic Exercise)    Shoulder (Therapeutic Exercise) AAROM (active assistive range of motion)  -     Shoulder AAROM (Therapeutic Exercise) bilateral;flexion;extension;5 repetitions;supine  -     Row Name 11/03/22 1524          Elbow/Forearm (Therapeutic Exercise)    Elbow/Forearm (Therapeutic Exercise) AAROM (active assistive range of motion)  -     Elbow/Forearm AAROM (Therapeutic Exercise) bilateral;flexion;extension;10 repetitions;supine  -     Row Name 11/03/22 1524          Motor Skills    Therapeutic Exercise shoulder;elbow/forearm  -     Row Name 11/03/22 1524          Balance    Balance Assessment sitting static balance;sitting dynamic balance  -KF     Static Sitting Balance standby assist  -KF     Dynamic Sitting Balance contact guard  -KF     Position, Sitting Balance unsupported;sitting edge of bed  -KF     Balance Interventions sitting;weight shifting activity;minimal challenge  -KF     Comment, Balance cues  for weightshifting without use of BUE for support and management of sternal precautions  -KF           User Key  (r) = Recorded By, (t) = Taken By, (c) = Cosigned By    Initials Name Provider Type    KF Tiffany Wahl, OT Occupational Therapist               Goals/Plan    No documentation.                Clinical Impression     Row Name 11/03/22 1525          Pain Assessment    Pretreatment Pain Rating 10/10  -KF     Posttreatment Pain Rating 10/10  -KF     Pain Location incisional  -KF     Pain Location - chest  -KF     Pre/Posttreatment Pain Comment RN aware and managing  -KF     Pain Intervention(s) Repositioned  -KF     Row Name 11/03/22 1520          Plan of Care Review    Plan of Care Reviewed With patient  -KF     Progress no change  -KF     Outcome Evaluation Pt with brief tolerance seated EOB with modA for completion of bed mobility and mod verbal cues for sequencing in order to manage sternal precautions safely, unable to maintain sitting in order to complete BUE ther ex and completed once returned to supine, cont IPOT per POC recom SNF at d/c  -KF     Row Name 11/03/22 1522          Therapy Assessment/Plan (OT)    Rehab Potential (OT) good, to achieve stated therapy goals  -     Criteria for Skilled Therapeutic Interventions Met (OT) yes;meets criteria;skilled treatment is necessary  -KF     Therapy Frequency (OT) daily  -KF     Row Name 11/03/22 1520          Therapy Plan Review/Discharge Plan (OT)    Anticipated Discharge Disposition (OT) skilled nursing facility  -     Row Name 11/03/22 152          Vital Signs    Pre Systolic BP Rehab 121  RN cleared VSS  -KF     Pre Treatment Diastolic BP 65  -KF     Pre SpO2 (%) 97  -KF     O2 Delivery Pre Treatment supplemental O2  2L NC  -KF     Pre Patient Position Supine  -KF     Intra Patient Position Sitting  -KF     Post Patient Position Supine  -KF     Rest Breaks  1  -KF     Row Name 11/03/22 152          Positioning and Restraints     Pre-Treatment Position in bed  -KF     Post Treatment Position bed  -KF     In Bed exit alarm on;notified nsg;supine;fowlers;call light within reach;encouraged to call for assist;legs elevated;heels elevated  -KF           User Key  (r) = Recorded By, (t) = Taken By, (c) = Cosigned By    Initials Name Provider Type    KF Tiffany Wahl, OT Occupational Therapist               Outcome Measures     Row Name 11/03/22 1528 11/03/22 1521       How much help from another is currently needed...    Putting on and taking off regular lower body clothing? --  -KF 2  -KF    Bathing (including washing, rinsing, and drying) --  -KF 2  -KF    Toileting (which includes using toilet bed pan or urinal) --  -KF 2  -KF    Putting on and taking off regular upper body clothing --  -KF 2  -KF    Taking care of personal grooming (such as brushing teeth) --  -KF 3  -KF    Eating meals --  -KF 3  -KF    AM-PAC 6 Clicks Score (OT) --  -KF 14  -KF    Row Name 11/03/22 1424 11/03/22 0800       How much help from another person do you currently need...    Turning from your back to your side while in flat bed without using bedrails? 2  -KG 2  -JJ    Moving from lying on back to sitting on the side of a flat bed without bedrails? 2  -KG 2  -JJ    Moving to and from a bed to a chair (including a wheelchair)? 3  -KG 3  -JJ    Standing up from a chair using your arms (e.g., wheelchair, bedside chair)? 3  -KG 3  -JJ    Climbing 3-5 steps with a railing? 2  -KG 2  -JJ    To walk in hospital room? 3  -KG 3  -JJ    AM-PAC 6 Clicks Score (PT) 15  -KG 15  -JJ    Highest level of mobility 4 --> Transferred to chair/commode  -KG 4 --> Transferred to chair/commode  -JJ    Row Name 11/03/22 1528 11/03/22 1521       Functional Assessment    Outcome Measure Options --  -KF AM-PAC 6 Clicks Daily Activity (OT)  -KF    Row Name 11/03/22 1424          Functional Assessment    Outcome Measure Options AM-PAC 6 Clicks Basic Mobility (PT)  -KG           User Key   (r) = Recorded By, (t) = Taken By, (c) = Cosigned By    Initials Name Provider Type    Stoney Tello, RN Registered Nurse    Harleen Pizarro, PT Physical Therapist    Tiffany Benson, OT Occupational Therapist                Occupational Therapy Education     Title: PT OT SLP Therapies (In Progress)     Topic: Occupational Therapy (In Progress)     Point: ADL training (In Progress)     Description:   Instruct learner(s) on proper safety adaptation and remediation techniques during self care or transfers.   Instruct in proper use of assistive devices.              Learning Progress Summary           Patient Acceptance, E,TB,D, NR by  at 11/3/2022 1529    Acceptance, E,TB,D, NR by  at 11/3/2022 1521    Acceptance, E,D,TB, VU,DU,NR by  at 11/1/2022 1005                   Point: Home exercise program (In Progress)     Description:   Instruct learner(s) on appropriate technique for monitoring, assisting and/or progressing therapeutic exercises/activities.              Learning Progress Summary           Patient Acceptance, E,TB,D, NR by  at 11/3/2022 1529    Acceptance, E,TB,D, NR by  at 11/3/2022 1521                   Point: Precautions (In Progress)     Description:   Instruct learner(s) on prescribed precautions during self-care and functional transfers.              Learning Progress Summary           Patient Acceptance, E,TB,D, NR by  at 11/3/2022 1529    Acceptance, E,TB,D, NR by  at 11/3/2022 1521    Acceptance, E,D,TB, VU,DU,NR by  at 11/1/2022 1005                   Point: Body mechanics (In Progress)     Description:   Instruct learner(s) on proper positioning and spine alignment during self-care, functional mobility activities and/or exercises.              Learning Progress Summary           Patient Acceptance, E,TB,D, NR by  at 11/3/2022 1529    Acceptance, E,TB,D, NR by  at 11/3/2022 1521    Acceptance, E,D,TB, VU,DU,NR by  at 11/1/2022 1005                                User Key     Initials Effective Dates Name Provider Type Discipline     06/16/21 -  Tiffany Wahl OT Occupational Therapist OT              OT Recommendation and Plan  Planned Therapy Interventions (OT): activity tolerance training, adaptive equipment training, BADL retraining, occupation/activity based interventions, strengthening exercise, transfer/mobility retraining, functional balance retraining, ROM/therapeutic exercise, patient/caregiver education/training  Therapy Frequency (OT): daily  Plan of Care Review  Plan of Care Reviewed With: patient  Progress: no change  Outcome Evaluation: Pt with brief tolerance seated EOB with modA for completion of bed mobility and mod verbal cues for sequencing in order to manage sternal precautions safely, unable to maintain sitting in order to complete BUE ther ex and completed once returned to supine, cont IPOT per POC recom SNF at d/c     Time Calculation:    Time Calculation- OT     Row Name 11/03/22 1413             Time Calculation- OT    OT Start Time 1413  -KF      OT Received On 11/03/22  -KF         Timed Charges    49352 - OT Therapeutic Exercise Minutes 5  -KF      04350 - OT Therapeutic Activity Minutes 6  -KF         Total Minutes    Timed Charges Total Minutes 11  -KF       Total Minutes 11  -KF            User Key  (r) = Recorded By, (t) = Taken By, (c) = Cosigned By    Initials Name Provider Type    KF Tiffany Wahl OT Occupational Therapist              Therapy Charges for Today     Code Description Service Date Service Provider Modifiers Qty    [id number]  OT THERAPEUTIC ACT EA 15 MIN 11/3/2022 Tiffany Wahl OT GO 1               Tiffany Wahl OT  11/3/2022

## 2022-11-03 NOTE — PLAN OF CARE
Goal Outcome Evaluation:  Plan of Care Reviewed With: patient        Progress: no change  Outcome Evaluation: Pt with brief tolerance seated EOB with modA for completion of bed mobility and mod verbal cues for sequencing in order to manage sternal precautions safely, unable to maintain sitting in order to complete BUE ther ex and completed once returned to supine, cont IPOT per POC recom SNF at d/c

## 2022-11-03 NOTE — PLAN OF CARE
Goal Outcome Evaluation:  Plan of Care Reviewed With: patient        Progress: improving   SLP evaluation completed. Will address mild-moderate oropharyngeal dysphagia during treatment. Please see note for further details and recommendations.

## 2022-11-03 NOTE — NURSING NOTE
Patient drowsy overnight, d/o to place. Knows she is at a hospital but dos not know which one. Refused to ambulate in the hallway d/t pain even after medication administration. Up to chair this AM. Plans to pull cordis today per MD Diaz orders; VSS

## 2022-11-03 NOTE — PROGRESS NOTES
Pt. Referred for Phase II Cardiac Rehab. Staff discussed benefits of exercise, program protocol, and educational material provided. Teach back verified.  Permission granted from patient for staff to fax referral information to outlying program at this time.  Staff faxed referral info to [facility].

## 2022-11-03 NOTE — PLAN OF CARE
Problem: Adult Inpatient Plan of Care  Goal: Plan of Care Review  Outcome: Ongoing, Progressing  Flowsheets  Taken 11/3/2022 0626 by Nadja Cobb, RN  Plan of Care Reviewed With: patient  Taken 11/2/2022 1148 by Betty Thomas MS CCC-SLP  Progress: improving  Goal: Optimal Comfort and Wellbeing  Outcome: Ongoing, Progressing  Intervention: Provide Person-Centered Care  Recent Flowsheet Documentation  Taken 11/2/2022 2000 by Nadja Cobb, RN  Trust Relationship/Rapport:   care explained   choices provided   Goal Outcome Evaluation:  Plan of Care Reviewed With: patient      MD Diaz rounded this AM pans to pull cordis and possible floor orders

## 2022-11-03 NOTE — PLAN OF CARE
Goal Outcome Evaluation:  Plan of Care Reviewed With: patient        Progress: no change  Outcome Evaluation: Pt ambulated 50ft with Seth and B UE support on tele monitor. Frequent cues for upright posture with forward gaze and increased stride length. Pt declined additional ambulation despite max encouragement. Limited by c/o increased incisional pain. Continue to progress as appropriate.

## 2022-11-03 NOTE — MBS/VFSS/FEES
Acute Care - Speech Language Pathology   Swallow Re-Evaluation [facility]   Fiberoptic Endoscopic Evaluation of Swallowing (FEES)     Patient Name: Shantel Ríos  : 1960  MRN: [id number]  Today's Date: 11/3/2022               Admit Date: 10/27/2022    Visit Dx:     ICD-10-CM ICD-9-CM   1. Oropharyngeal dysphagia  R13.12 787.22   2. 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 Myoview-([facility]. Dr. Floyd) EF44%. Inferolateral Infarct with Ischemia.   • CARDIOVASCULAR STRESS TEST  03/10/2015    L. Myoview:EF48% inferior ischemia, lateral infarctm ranexa added   • CATH LAB PROCEDURE      Cath-(FI)7 stents   • CATH LAB PROCEDURE      Cath-(Vand) 1 stent   • CATH LAB PROCEDURE  2014    Cath-(Dr. Floyd) 50-70%LAD. 30-50%Instent stenosis of RCA.   • CORONARY ARTERY BYPASS GRAFT N/A 10/28/2022    Procedure: MEDIAN STERNOTOMY, CORONARY ARTERY BYPASS GRAFTING X 3, UTILIZING THE LEFT INTERNAL MAMMARY ARTERY, ENDOSCOPIC VEIN HARVEST OF THE RIGHT GREATER SAPHENOUS VEIN;  Surgeon: Bayron Diaz MD;  Location: [facility];   Service: Cardiothoracic;  Laterality: N/A;   • ECHO - CONVERTED  11/07/2014    Echo-([facility]. ) Mild depressed EF   • ECHO - CONVERTED  03/10/2015    Echo: EF: 50-55%, RVSP 19mmHg, mild MR   • HYSTERECTOMY      Radical       SLP Recommendation and Plan  SLP Swallowing Diagnosis: mild-moderate, oral dysphagia, pharyngeal dysphagia (11/03/22 0930)  SLP Diet Recommendation: mechanical soft with no mixed consistencies, nectar thick liquids, ice chips between meals after oral care, with supervision (11/03/22 0930)  Recommended Precautions and Strategies: upright posture during/after eating, small bites of food and sips of liquid, general aspiration precautions, reflux precautions (single sips only) (11/03/22 0930)  SLP Rec. for Method of Medication Administration: meds whole, meds crushed, with puree, as tolerated, meds via alternate route (11/03/22 0930)     Monitor for Signs of Aspiration: yes, notify SLP if any concerns (11/03/22 0930)     Swallow Criteria for Skilled Therapeutic Interventions Met: demonstrates skilled criteria (11/03/22 0930)  Anticipated Discharge Disposition (SLP): unknown, anticipate therapy at next level of care (11/03/22 0930)  Rehab Potential/Prognosis, Swallowing: good, to achieve stated therapy goals (11/03/22 0930)  Therapy Frequency (Swallow): 5 days per week (11/03/22 0930)  Predicted Duration Therapy Intervention (Days): until discharge (11/03/22 0930)                                        Plan of Care Reviewed With: patient  Progress: improving      SWALLOW EVALUATION (last 72 hours)     SLP Adult Swallow Evaluation     Row Name 11/03/22 0930                Rehab Evaluation    Document Type re-evaluation  -RD       Subjective Information no complaints  -RD       Patient Observations alert;cooperative;agree to therapy  -RD       Patient/Family/Caregiver Comments/Observations RN present, no family  -RD       Patient Effort good  -RD       Symptoms Noted During/After Treatment --           General Information    Patient Profile Reviewed yes  -RD       Pertinent History Of Current Problem See initial evaluation. CAD s/p CABG x3, GERD, gastric banding, drug use. Repeat FEES to see if ready for diet upgrade.  -RD       Current Method of Nutrition mechanical soft, no mixed consistencies;honey-thick liquids  -RD       Precautions/Limitations, Vision WFL;for purposes of eval  -RD       Precautions/Limitations, Hearing WFL  -RD       Prior Level of Function-Communication unknown  -RD       Prior Level of Function-Swallowing no diet consistency restrictions  -RD       Plans/Goals Discussed with patient;agreed upon  -RD       Barriers to Rehab medically complex  -RD       Patient's Goals for Discharge eat/drink without coughing/choking;return to regular diet  -RD          Pain    Additional Documentation Pain Scale: Numbers Pre/Post-Treatment (Group)  -RD          Pain Scale: Numbers Pre/Post-Treatment    Pretreatment Pain Rating 5/10  -RD       Posttreatment Pain Rating 5/10  -RD       Pain Location incisional  -RD       Pain Location - chest  -RD       Pre/Posttreatment Pain Comment RN aware  -RD          Pain Scale: FACES Pre/Post-Treatment    Pain: FACES Scale, Pretreatment --       Posttreatment Pain Rating --          Fiberoptic Endoscopic Evaluation of Swallowing (FEES)    Risks/Benefits Reviewed risks/benefits explained;patient;agreed to eval  -RD       Nasal Entry right:  -RD       Scope serial number/identification 338  -RD          Anatomy and Physiology    Anatomic Considerations erythema;anatomic deviation observed (see comments)  -RD       Comment Edema improving. Erythema t/o pharynx. Erythema at posterior portion of TVFs c/w intubation. Pt also noted w/ white appearing lesion on underside of R TVF.  -RD       Velopharyngeal WFL  -RD       Base of Tongue symmetrical;range reduced  -RD       Epiglottis WFL  -RD       Laryngeal Function Breathing symmetrical  -RD       Laryngeal Function  Phonation symmetrical  -RD       Laryngeal Function to Breath Hold TVF/FVF/Arytenoid  -RD       Secretion Rating Scale (Sam et al. 1996) 1- secretions present around the laryngeal vestibule  -RD       Secretion Description thin;clear  -RD       Ice Chips DNA  -RD       Spontaneous Swallow frequency adequate  -RD       Sensory sensed scope  -RD       Utensils Used Spoon;Cup;Straw  -RD       Consistencies Trialed thin liquids;nectar-thick liquids;pudding/puree;regular textures;spoon;cup;straw  -RD          FEES Interpretation    Oral Phase prolonged manipulation;with solids only;prespill of liquids into pharynx;reduced lingual control  dentition impacting, missing lower dentures  -RD          Initiation of Pharyngeal Swallow    Initiation of Pharyngeal Swallow bolus in pyriform sinuses  -RD       Pharyngeal Phase impaired pharyngeal phase of swallowing  -RD       Penetration Before the Swallow thin liquids;nectar-thick liquids;secondary to reduced back of tongue control;secondary to delayed swallow initiation or mistiming;other (see comments)  consecutive sips of nectar-thick only  -RD       Aspiration During the Swallow thin liquids;secondary to delayed swallow initiation or mistiming;secondary to reduced laryngeal elevation;secondary to reduced vestibular closure  -RD       Aspiration After the Swallow thin liquids;nectar-thick liquids;secondary to residue;in laryngeal vestibule;other (see comments)  nectar via consecutive drinks only  -RD       Depth of Penetration deep  -RD       Response to Penetration No  -RD       No spontaneous response to penetration and effective laryngeal clearance with cue (see comments)  cued cough  -RD       Response to Aspiration No  inconsistent cough, otherwise silent  -RD       No spontaneous response to aspiration with effective subglottic clearance with cue (see comments)  cleared some of aspiration  -RD       Rosenbek's Scale thin:;nectar:;8-->Level 8;pudding/puree:;regular  textures:;1-->Level 1  -RD       Residue all consistencies tested;diffuse within pharynx;secondary to reduced base of tongue retraction;secondary to reduced posterior pharyngeal wall stripping;secondary to reduced laryngeal elevation;secondary to reduced hyolaryngeal excursion  mild  -RD       Response to Residue cleared residue;with cued swallow  mostly  -RD       Attempted Compensatory Maneuvers bolus size;bolus presentation style;additional subsequent swallow;effortful (hard swallow);throat clear after swallow;other (see comments)  3 second prep  -RD       Response to Attempted Compensatory Maneuvers prevented penetration;prevented aspiration;reduced residue;other (see comments)  w/ single sips of nectar-thick liquids; extra swallow to clear residue  -RD       Successful Compensatory Maneuver Competency patient able to;demonstrate compensations;independently  -RD       Pharyngeal Phase, Comment Mild-moderate pharyngeal dysphagia. Penetration before w/ aspiration during the swallow w/ thin liquids 2' pre-spill, delay, and reduced vestibular closure. Pt noted to aspirate thin vestibular residue upon subsequent swallows. Aspiration was inconsistent via tsp w/ thins & compensations were unsuccessful to prevent aspiration of thins. Penetration before w/ aspiration of vestibular residue w/ nectar-thick liquids via consecutive drinks only. Pt inconsistently sensed aspiration w/ weak coughing, otherwise aspiration was silent. Pt able to clear most of subglottic material w/ multiple cued coughs. No immediate penetration/aspiration w/ single sips of nectar-thick liquids, pudding, or solids. Mild diffuse pharyngeal residue w/ all consistencies 2' cont'd diffuse generalized pharyngeal weakness. Pt would benefit from cont'd modified diet w/ single sips precaution as well as cont'd oropharyngeal strengthening. Pt agreeable.  -RD          SLP Evaluation Clinical Impression    SLP Swallowing Diagnosis mild-moderate;oral  dysphagia;pharyngeal dysphagia  -RD       Functional Impact risk of aspiration/pneumonia  -RD       Rehab Potential/Prognosis, Swallowing good, to achieve stated therapy goals  -RD       Swallow Criteria for Skilled Therapeutic Interventions Met demonstrates skilled criteria  -RD          Recommendations    Therapy Frequency (Swallow) 5 days per week  -RD       Predicted Duration Therapy Intervention (Days) until discharge  -RD       SLP Diet Recommendation mechanical soft with no mixed consistencies;nectar thick liquids;ice chips between meals after oral care, with supervision  -RD       Recommended Diagnostics --       Recommended Precautions and Strategies upright posture during/after eating;small bites of food and sips of liquid;general aspiration precautions;reflux precautions  single sips only  -RD       Oral Care Recommendations Oral Care BID/PRN;Suction toothbrush  -RD       SLP Rec. for Method of Medication Administration meds whole;meds crushed;with puree;as tolerated;meds via alternate route  -RD       Monitor for Signs of Aspiration yes;notify SLP if any concerns  -RD       Anticipated Discharge Disposition (SLP) unknown;anticipate therapy at next level of care  -RD             User Key  (r) = Recorded By, (t) = Taken By, (c) = Cosigned By    Initials Name Effective Dates    RD Tara Potter, MS CCC-SLP 06/16/21 -     Betty Cunningham, MS CCC-SLP 06/16/21 -                 EDUCATION  The patient has been educated in the following areas:   Dysphagia (Swallowing Impairment) Oral Care/Hydration Modified Diet Instruction.        SLP GOALS     Row Name 11/03/22 0930 11/02/22 0955          (LTG) Patient will demonstrate functional swallow for    Diet Texture (Demonstrate functional swallow) regular textures  -RD regular textures  -CJ     Liquid viscosity (Demonstrate functional swallow) thin liquids  -RD thin liquids  -CJ     Challenge (Demonstrate functional swallow) independently (over 90%  accuracy)  -RD independently (over 90% accuracy)  -CJ     Time Frame (Demonstrate functional swallow) by discharge  -RD by discharge  -CJ     Progress/Outcomes (Demonstrate functional swallow) continuing progress toward goal  -RD continuing progress toward goal  -CJ        (STG) Patient will tolerate trials of    Consistencies Trialed (Tolerate trials) soft ground textures;nectar/ mildly thick liquids  -RD pureed/ mashed textures;honey/ moderately thick liquids  -CJ     Desired Outcome (Tolerate trials) without signs/symptoms of aspiration;without signs of distress;with adequate oral prep/transit/clearance;with use of compensatory strategies (see comments)  -RD without signs/symptoms of aspiration;without signs of distress;with adequate oral prep/transit/clearance  -CJ     [place] (Tolerate trials) independently (over 90% accuracy)  -RD independently (over 90% accuracy)  -CJ     Time Frame (Tolerate trials) 1 week  -RD 1 week  -CJ     Progress/Outcomes (Tolerate trials) goal partially met;goal revised this date;continuing progress toward goal  -RD continuing progress toward goal  -CJ     Comment (Tolerate trials) -- no overt s/s of aspiration w/ honey  -CJ        (STG) Lingual Strengthening Goal 1 (SLP)    Activity (Lingual Strengthening Goal 1, SLP) increase lingual tone/sensation/control/coordination/movement;increase tongue back strength  -RD increase lingual tone/sensation/control/coordination/movement  -CJ     Increase Lingual Tone/Sensation/Control/Coordination/Movement swallow trials  -RD swallow trials;lingual resistance exercises  -CJ     Increase Tongue Back Strength lingual resistance exercises  -RD --     [place]/Accuracy (Lingual Strengthening Goal 1, SLP) with minimal cues (75-90% accuracy)  -RD with minimal cues (75-90% accuracy)  -CJ     Time Frame (Lingual Strengthening Goal 1, SLP) short term goal (STG)  -RD short term goal (STG)  -CJ     Progress/Outcomes (Lingual Strengthening Goal 1,  SLP) -- continuing progress toward goal  -CJ        (STG) Pharyngeal Strengthening Exercise Goal 1 (SLP)    Activity (Pharyngeal Strengthening Goal 1, SLP) increase timing;increase superior movement of the hyolaryngeal complex;increase anterior movement of the hyolaryngeal complex;increase closure at entrance to airway/closure of airway at glottis;increase squeeze/positive pressure generation;increase tongue base retraction  -RD increase timing;increase superior movement of the hyolaryngeal complex;increase anterior movement of the hyolaryngeal complex;increase closure at entrance to airway/closure of airway at glottis;increase squeeze/positive pressure generation  -CJ     Increase Timing prepping - 3 second prep or suck swallow or 3-step swallow  -RD prepping - 3 second prep or suck swallow or 3-step swallow  -CJ     Increase Superior Movement of the Hyolaryngeal Complex falsetto  -RD effortful pitch glide (falsetto + pharyngeal squeeze)  -CJ     Increase Anterior Movement of the Hyolaryngeal Complex chin tuck against resistance (CTAR)  -RD chin tuck against resistance (CTAR)  -CJ     Increase Closure at Entrance to Airway/Closure of Airway at Glottis supraglottic swallow;breath hold exercises  -RD supraglottic swallow;breath hold exercises  -CJ     Increase Squeeze/Positive Pressure Generation effortful pitch glide (falsetto + pharyngeal squeeze)  -RD hard effortful swallow  -CJ     Increase Tongue Base Retraction hard effortful swallow  -RD --     [place]/Accuracy (Pharyngeal Strengthening Goal 1, SLP) with minimal cues (75-90% accuracy)  -RD with moderate cues (50-74% accuracy)  -CJ     Time Frame (Pharyngeal Strengthening Goal 1, SLP) short term goal (STG)  -RD short term goal (STG)  -CJ     Progress/Outcomes (Pharyngeal Strengthening Goal 1, SLP) goal ongoing;goal revised this date  -RD continuing progress toward goal  -CJ     Comment (Pharyngeal Strengthening Goal 1, SLP) -- pitch glide x1 x5 reps;  breath hold x2 sets x5 reps; effortful x2 sets x5 reps; SG attempted x1 set x5 reps  -        (STG) Swallow Compensatory Strategies Goal 1 (SLP)    Activity (Swallow Compensatory Strategies/Techniques Goal 1, SLP) compensatory strategies;postural techniques;during meal intake;small bites;small cup sips;small straw sips  -RD --     [place]/Accuracy (Swallow Compensatory Strategies/Techniques Goal 1, SLP) independently (over 90% accuracy)  -RD --     Time Frame (Swallow Compensatory Strategies/Techniques Goal 1, SLP) short term goal (STG)  -RD --           User Key  (r) = Recorded By, (t) = Taken By, (c) = Cosigned By    Initials Name Provider Type    Tara Feldman, MS CCC-SLP Speech and Language Pathologist    Betty Cunningham MS CCC-SLP Speech and Language Pathologist                   Time Calculation:    Time Calculation- SLP     Row Name 11/03/22 1218             Time Calculation- SLP    SLP Start Time 0930  -RD      SLP Received On 11/03/22  -RD         Untimed Charges    08992-[facility] Fiberoptic Endo Eval Swallow Minutes 85  -RD         Total Minutes    Untimed Charges Total Minutes 85  -RD       Total Minutes 85  -RD            User Key  (r) = Recorded By, (t) = Taken By, (c) = Cosigned By    Initials Name Provider Type    Tara Feldman MS CCC-SLP Speech and Language Pathologist                Therapy Charges for Today     Code Description Service Date Service Provider Modifiers Qty    [id number]  ST FIBEROPTIC ENDO EVAL SWALL 6 11/3/2022 Tara Potter MS CCC-SLP GN 1        Patient was not wearing a face mask and did exhibit coughing during this therapy encounter.  Procedure performed was aerosolizing, involved close contact (within 6 feet for at least 15 minutes or longer), and did not involve contact with infectious secretions or specimens.  Therapist used appropriate personal protective equipment including gloves, standard procedure mask and eye protection.  Appropriate PPE  was worn during the entire therapy session.  Hand hygiene was completed before and after therapy session.        Tara Potter MS CCC-SLP  11/3/2022

## 2022-11-04 LAB
ANION GAP SERPL CALCULATED.3IONS-SCNC: 6 MMOL/L (ref 5–15)
BUN SERPL-MCNC: 14 MG/DL (ref 8–23)
BUN/CREAT SERPL: 25.5 (ref 7–25)
CALCIUM SPEC-SCNC: 8.3 MG/DL (ref 8.6–10.5)
CHLORIDE SERPL-SCNC: 103 MMOL/L (ref 98–107)
CO2 SERPL-SCNC: 30 MMOL/L (ref 22–29)
CREAT SERPL-MCNC: 0.55 MG/DL (ref 0.57–1)
DEPRECATED RDW RBC AUTO: 44.7 FL (ref 37–54)
EGFRCR SERPLBLD CKD-EPI 2021: 104.4 ML/MIN/1.73
ERYTHROCYTE [DISTWIDTH] IN BLOOD BY AUTOMATED COUNT: 12.9 % (ref 12.3–15.4)
GLUCOSE BLDC GLUCOMTR-MCNC: 123 MG/DL (ref 70–130)
GLUCOSE BLDC GLUCOMTR-MCNC: 129 MG/DL (ref 70–130)
GLUCOSE BLDC GLUCOMTR-MCNC: 164 MG/DL (ref 70–130)
GLUCOSE BLDC GLUCOMTR-MCNC: 171 MG/DL (ref 70–130)
GLUCOSE SERPL-MCNC: 112 MG/DL (ref 65–99)
HCT VFR BLD AUTO: 32.3 % (ref 34–46.6)
HGB BLD-MCNC: 10.2 G/DL (ref 12–15.9)
MAGNESIUM SERPL-MCNC: 1.7 MG/DL (ref 1.6–2.4)
MCH RBC QN AUTO: 30.1 PG (ref 26.6–33)
MCHC RBC AUTO-ENTMCNC: 31.6 G/DL (ref 31.5–35.7)
MCV RBC AUTO: 95.3 FL (ref 79–97)
PHOSPHATE SERPL-MCNC: 3.4 MG/DL (ref 2.5–4.5)
PLATELET # BLD AUTO: 264 10*3/MM3 (ref 140–450)
PMV BLD AUTO: 10.8 FL (ref 6–12)
POTASSIUM SERPL-SCNC: 3.9 MMOL/L (ref 3.5–5.2)
RBC # BLD AUTO: 3.39 10*6/MM3 (ref 3.77–5.28)
SODIUM SERPL-SCNC: 139 MMOL/L (ref 136–145)
WBC NRBC COR # BLD: 10.08 10*3/MM3 (ref 3.4–10.8)

## 2022-11-04 PROCEDURE — 85027 COMPLETE CBC AUTOMATED: CPT | Performed by: INTERNAL MEDICINE

## 2022-11-04 PROCEDURE — 99232 SBSQ HOSP IP/OBS MODERATE 35: CPT | Performed by: INTERNAL MEDICINE

## 2022-11-04 PROCEDURE — 94799 UNLISTED PULMONARY SVC/PX: CPT

## 2022-11-04 PROCEDURE — 0 MAGNESIUM SULFATE 4 GM/100ML SOLUTION: Performed by: PHYSICIAN ASSISTANT

## 2022-11-04 PROCEDURE — 63710000001 INSULIN LISPRO (HUMAN) PER 5 UNITS: Performed by: INTERNAL MEDICINE

## 2022-11-04 PROCEDURE — 63710000001 INSULIN DETEMIR PER 5 UNITS: Performed by: INTERNAL MEDICINE

## 2022-11-04 PROCEDURE — 84100 ASSAY OF PHOSPHORUS: CPT | Performed by: INTERNAL MEDICINE

## 2022-11-04 PROCEDURE — 80048 BASIC METABOLIC PNL TOTAL CA: CPT | Performed by: INTERNAL MEDICINE

## 2022-11-04 PROCEDURE — 99024 POSTOP FOLLOW-UP VISIT: CPT | Performed by: THORACIC SURGERY (CARDIOTHORACIC VASCULAR SURGERY)

## 2022-11-04 PROCEDURE — 97530 THERAPEUTIC ACTIVITIES: CPT

## 2022-11-04 PROCEDURE — 83735 ASSAY OF MAGNESIUM: CPT | Performed by: INTERNAL MEDICINE

## 2022-11-04 PROCEDURE — 82962 GLUCOSE BLOOD TEST: CPT

## 2022-11-04 PROCEDURE — 25010000002 HEPARIN (PORCINE) PER 1000 UNITS: Performed by: INTERNAL MEDICINE

## 2022-11-04 RX ORDER — BISACODYL 10 MG
10 SUPPOSITORY, RECTAL RECTAL DAILY PRN
Status: DISCONTINUED | OUTPATIENT
Start: 2022-11-04 | End: 2022-11-04 | Stop reason: SDUPTHER

## 2022-11-04 RX ORDER — AMOXICILLIN 250 MG
2 CAPSULE ORAL 2 TIMES DAILY PRN
Status: DISCONTINUED | OUTPATIENT
Start: 2022-11-04 | End: 2022-11-05 | Stop reason: HOSPADM

## 2022-11-04 RX ORDER — SODIUM CHLORIDE 0.9 % (FLUSH) 0.9 %
10 SYRINGE (ML) INJECTION EVERY 8 HOURS SCHEDULED
Status: DISCONTINUED | OUTPATIENT
Start: 2022-11-05 | End: 2022-11-05 | Stop reason: HOSPADM

## 2022-11-04 RX ORDER — BISACODYL 5 MG/1
10 TABLET, DELAYED RELEASE ORAL DAILY PRN
Status: DISCONTINUED | OUTPATIENT
Start: 2022-11-04 | End: 2022-11-05 | Stop reason: HOSPADM

## 2022-11-04 RX ORDER — DOCUSATE SODIUM 100 MG/1
100 CAPSULE, LIQUID FILLED ORAL 2 TIMES DAILY PRN
Status: DISCONTINUED | OUTPATIENT
Start: 2022-11-04 | End: 2022-11-05 | Stop reason: HOSPADM

## 2022-11-04 RX ORDER — SODIUM CHLORIDE 0.9 % (FLUSH) 0.9 %
10 SYRINGE (ML) INJECTION EVERY 12 HOURS SCHEDULED
Status: DISCONTINUED | OUTPATIENT
Start: 2022-11-05 | End: 2022-11-05 | Stop reason: HOSPADM

## 2022-11-04 RX ADMIN — SENNOSIDES AND DOCUSATE SODIUM 2 TABLET: 50; 8.6 TABLET ORAL at 21:25

## 2022-11-04 RX ADMIN — INSULIN LISPRO 2 UNITS: 100 INJECTION, SOLUTION INTRAVENOUS; SUBCUTANEOUS at 08:58

## 2022-11-04 RX ADMIN — ASPIRIN 81 MG CHEWABLE TABLET 324 MG: 81 TABLET CHEWABLE at 08:57

## 2022-11-04 RX ADMIN — HEPARIN SODIUM 5000 UNITS: 5000 INJECTION INTRAVENOUS; SUBCUTANEOUS at 06:23

## 2022-11-04 RX ADMIN — PANTOPRAZOLE SODIUM 40 MG: 40 INJECTION, POWDER, FOR SOLUTION INTRAVENOUS at 06:23

## 2022-11-04 RX ADMIN — INSULIN LISPRO 2 UNITS: 100 INJECTION, SOLUTION INTRAVENOUS; SUBCUTANEOUS at 17:28

## 2022-11-04 RX ADMIN — HYDROCODONE BITARTRATE AND ACETAMINOPHEN 1 TABLET: 5; 325 TABLET ORAL at 15:41

## 2022-11-04 RX ADMIN — INSULIN DETEMIR 5 UNITS: 100 INJECTION, SOLUTION SUBCUTANEOUS at 08:58

## 2022-11-04 RX ADMIN — INSULIN LISPRO 2 UNITS: 100 INJECTION, SOLUTION INTRAVENOUS; SUBCUTANEOUS at 12:27

## 2022-11-04 RX ADMIN — ACETAMINOPHEN 650 MG: 325 TABLET, FILM COATED ORAL at 01:34

## 2022-11-04 RX ADMIN — ACETAMINOPHEN 650 MG: 325 TABLET, FILM COATED ORAL at 08:58

## 2022-11-04 RX ADMIN — CLOPIDOGREL BISULFATE 75 MG: 75 TABLET ORAL at 08:58

## 2022-11-04 RX ADMIN — METOPROLOL TARTRATE 12.5 MG: 25 TABLET, FILM COATED ORAL at 08:58

## 2022-11-04 RX ADMIN — INSULIN LISPRO 2 UNITS: 100 INJECTION, SOLUTION INTRAVENOUS; SUBCUTANEOUS at 21:24

## 2022-11-04 RX ADMIN — HEPARIN SODIUM 5000 UNITS: 5000 INJECTION INTRAVENOUS; SUBCUTANEOUS at 14:57

## 2022-11-04 RX ADMIN — Medication 10 ML: at 21:28

## 2022-11-04 RX ADMIN — ATORVASTATIN CALCIUM 40 MG: 40 TABLET, FILM COATED ORAL at 21:25

## 2022-11-04 RX ADMIN — HYDROCODONE BITARTRATE AND ACETAMINOPHEN 1 TABLET: 5; 325 TABLET ORAL at 22:13

## 2022-11-04 RX ADMIN — HYDROCODONE BITARTRATE AND ACETAMINOPHEN 1 TABLET: 5; 325 TABLET ORAL at 09:17

## 2022-11-04 RX ADMIN — MAGNESIUM SULFATE HEPTAHYDRATE 4 G: 40 INJECTION, SOLUTION INTRAVENOUS at 04:03

## 2022-11-04 RX ADMIN — HEPARIN SODIUM 5000 UNITS: 5000 INJECTION INTRAVENOUS; SUBCUTANEOUS at 21:25

## 2022-11-04 RX ADMIN — METOPROLOL TARTRATE 12.5 MG: 25 TABLET, FILM COATED ORAL at 21:25

## 2022-11-04 RX ADMIN — LISINOPRIL 10 MG: 10 TABLET ORAL at 08:58

## 2022-11-04 RX ADMIN — IPRATROPIUM BROMIDE AND ALBUTEROL SULFATE 3 ML: .5; 3 SOLUTION RESPIRATORY (INHALATION) at 19:46

## 2022-11-04 RX ADMIN — SENNOSIDES AND DOCUSATE SODIUM 2 TABLET: 50; 8.6 TABLET ORAL at 08:59

## 2022-11-04 RX ADMIN — ESCITALOPRAM OXALATE 10 MG: 10 TABLET ORAL at 08:58

## 2022-11-04 NOTE — DISCHARGE PLACEMENT REQUEST
"Shantel Nance (62 y.o. Female)     Date of Birth   1960    Social Security Number       Address   [address] [place] [address]    Home Phone   [phone]    MRN   [id number]       Scientologist   Unknown    Marital Status                               Admission Date   10/27/22    Admission Type   Urgent    Admitting Provider   Bayron Diaz MD    Attending Provider   Bayron Diaz MD    Department, Room/Bed   [facility]       Discharge Date       Discharge Disposition       Discharge Destination                               Attending Provider: Bayron Diaz MD    Allergies: Adhesive Tape, Contrast Dye, Hydrochlorothiazide, Keflex [Cephalexin], Penicillins, Sulfa Antibiotics    Isolation: None   Infection: None   Code Status: CPR    Ht: 160 cm (62.99\")   Wt: 83.7 kg (184 lb 8.4 oz)    Admission Cmt: None   Principal Problem: I21.4 CABG 10/28/22 [I21.4]                 Active Insurance as of 10/27/2022     Primary Coverage     Payor Plan Insurance Group Employer/Plan Group    ANTHEM MEDICARE REPLACEMENT ANTHEM MEDICARE ADVANTAGE [id number]     Payor Plan Address Payor Plan Phone Number Payor Plan Fax Number Effective Dates    [address] [phone]  5/1/2022 - None Entered    [facility] [address]       Subscriber Name Subscriber Birth Date Member ID       SHANTEL NANCE 1960 [id number]           Secondary Coverage     Payor Plan Insurance Group Employer/Plan Group    [employer] WELL[place]ARE [place]ICAID      Payor Plan Address Payor Plan Phone Number Payor Plan Fax Number Effective Dates    [address] [address] [phone]  6/30/2016 - None Entered    [facility] [id number]       Subscriber Name Subscriber Birth Date Member ID       SHANTEL NANCE 1960 [id number]                 Emergency Contacts      (Rel.) Home Phone Work Phone Mobile Phone    Mary Beth Gomez (Sister) [phone] -- [phone]            Vital Signs (last day)     " Date/Time Temp Temp src Pulse Resp BP Patient Position SpO2    11/04/22 0600 98.8 (37.1) Oral 76 16 122/65 Lying 94    11/04/22 0500 -- -- 75 -- 136/62 -- 95    11/04/22 0400 -- -- 75 16 147/70 Lying 92    11/04/22 0300 -- -- 68 -- 135/78 -- 95    11/04/22 0200 98.8 (37.1) Oral 66 18 135/72 Lying 94    11/04/22 0100 -- -- 67 -- 135/68 -- 94    11/04/22 0000 -- -- 66 16 132/72 Lying 97    11/03/22 2300 -- -- 64 -- 138/76 -- 95    11/03/22 2200 -- -- 70 16 127/73 Lying 96    11/03/22 2125 -- -- 75 -- 168/85 -- --    11/03/22 2100 -- -- 78 -- 168/85 -- 94    11/03/22 2000 98.2 (36.8) Oral 73 18 142/76 Lying 89    11/03/22 1900 -- -- 71 -- 152/72 -- 94    11/03/22 1800 98.6 (37) Axillary 69 20 131/73 -- 94    11/03/22 1700 -- -- 71 -- 143/72 -- 96    11/03/22 1654 -- -- 77 -- -- -- 86    11/03/22 1649 -- -- 73 -- -- -- 88    11/03/22 1600 -- -- 69 20 129/70 -- 97    11/03/22 1500 -- -- 74 -- 122/71 -- 90    11/03/22 1400 -- -- 68 -- 121/65 -- 96    11/03/22 1300 -- -- 78 -- 133/74 -- 95    11/03/22 1200 98.8 (37.1) Oral 78 20 122/66 -- 97    11/03/22 1144 -- -- 79 -- -- -- 97    11/03/22 1000 -- -- 76 -- 122/68 -- 93    11/03/22 0900 -- -- 80 -- 114/71 -- 91    11/03/22 0800 98.7 (37.1) Oral 70 20 109/67 -- 92    11/03/22 0746 -- -- 69 16 -- -- 96    11/03/22 0700 -- -- 70 -- 114/74 -- 97    11/03/22 0600 -- -- 72 -- 100/78 -- 97    11/03/22 0515 -- -- 72 -- 119/74 -- 96    11/03/22 0400 98.9 (37.2) Oral 67 14 121/70 Lying 98    11/03/22 0300 -- -- 67 -- 131/70 -- 99    11/03/22 0200 -- -- 68 -- 126/77 -- 97    11/03/22 0100 -- -- 70 -- 123/64 -- 99    11/03/22 0000 98.8 (37.1) Oral 72 12 125/65 Lying 96          Current Facility-Administered Medications   Medication Dose Route Frequency Provider Last Rate Last Admin   • acetaminophen (TYLENOL) tablet 650 mg  650 mg Oral Q4H PRN Brenton Yu PA   650 mg at 11/04/22 0134   • acetaminophen (TYLENOL) tablet 650 mg  650 mg Oral Q8H Bayron Diaz MD   650 mg at  11/04/22 0858   • aspirin chewable tablet 324 mg  324 mg Oral Daily Shree Prasad MD   324 mg at 11/04/22 0857   • atorvastatin (LIPITOR) tablet 40 mg  40 mg Oral Nightly Brenton Yu PA   40 mg at 11/03/22 2125   • bisacodyl (DULCOLAX) suppository 10 mg  10 mg Rectal Daily PRN Bayron Diaz MD       • clopidogrel (PLAVIX) tablet 75 mg  75 mg Oral Daily Bayron Diaz MD   75 mg at 11/04/22 0858   • dextrose (D50W) (25 g/50 mL) IV injection 25 g  25 g Intravenous Q15 Min PRN Shree Prasad MD       • dextrose (GLUTOSE) oral gel 15 g  15 g Oral Q15 Min PRN Shree Prasad MD       • escitalopram (LEXAPRO) tablet 10 mg  10 mg Oral Daily Brenton Yu PA   10 mg at 11/04/22 0858   • glucagon (human recombinant) (GLUCAGEN DIAGNOSTIC) injection 1 mg  1 mg Intramuscular Q15 Min PRN Shree Prasad MD       • guaifenesin (ROBITUSSIN) 100 MG/5ML liquid 200 mg  200 mg Oral Q4H PRN Bayron Diaz MD   200 mg at 11/01/22 0810   • heparin (porcine) 5000 UNIT/ML injection 5,000 Units  5,000 Units Subcutaneous Q8H Kerwin Bella MD   5,000 Units at 11/04/22 0623   • hydrALAZINE (APRESOLINE) injection 10 mg  10 mg Intravenous Q8H PRN Farida Bui APRN   10 mg at 11/01/22 0015   • HYDROcodone-acetaminophen (NORCO) 5-325 MG per tablet 1 tablet  1 tablet Oral Q6H PRN Bayron Diaz MD   1 tablet at 11/04/22 0917   • influenza vac split quad (FLUZONE,FLUARIX,AFLURIA,FLULAVAL) injection 0.5 mL  0.5 mL Intramuscular During Hospitalization Brenton Yu PA       • insulin detemir (LEVEMIR) injection 5 Units  5 Units Subcutaneous Daily Shree Prasad MD   5 Units at 11/04/22 0858   • Insulin Lispro (humaLOG) injection 0-7 Units  0-7 Units Subcutaneous 4x Daily With Meals & Nightly Kerwin Bella MD   2 Units at 11/03/22 1710   • Insulin Lispro (humaLOG) injection 2 Units  2 Units Subcutaneous TID With Meals Kerwin Bella MD   2 Units at 11/04/22 0858   • ipratropium-albuterol  (DUO-NEB) nebulizer solution 3 mL  3 mL Nebulization Q4H PRN Brenton Yu PA   3 mL at 10/29/22 1319   • ipratropium-albuterol (DUO-NEB) nebulizer solution 3 mL  3 mL Nebulization 4x Daily - RT Shree Prasad MD   3 mL at 11/03/22 0746   • lisinopril (PRINIVIL,ZESTRIL) tablet 10 mg  10 mg Oral Q24H Kerwin Bella MD   10 mg at 11/04/22 0858   • Magnesium Sulfate 2 gram Bolus, followed by 8 gram infusion (total Mg dose 10 grams)- Mg less than or equal to 1mg/dL  2 g Intravenous PRN Jame Causey PA-C        Or   • Magnesium Sulfate 2 gram / 50mL Infusion (GIVE X 3 BAGS TO EQUAL 6GM TOTAL DOSE) - Mg 1.1 - 1.5 mg/dl  2 g Intravenous PRN Jame Causey PA-C        Or   • Magnesium Sulfate 4 gram infusion- Mg 1.6-1.9 mg/dL  4 g Intravenous PRN Jame Causey PA-C 25 mL/hr at 11/04/22 0403 4 g at 11/04/22 0403   • metoprolol tartrate (LOPRESSOR) half tablet 12.5 mg  12.5 mg Oral Q12H Brenton Yu PA   12.5 mg at 11/04/22 0858   • morphine injection 2 mg  2 mg Intravenous Q2H PRN Brenton Yu PA       • ondansetron (ZOFRAN) injection 4 mg  4 mg Intravenous Q6H PRN Brenton Yu PA   4 mg at 11/02/22 0838   • oxyCODONE (ROXICODONE) immediate release tablet 10 mg  10 mg Oral Q4H PRN Bayron Diaz MD   10 mg at 11/01/22 1831   • pantoprazole (PROTONIX) injection 40 mg  40 mg Intravenous Q AM Shree Prasad MD   40 mg at 11/04/22 0623   • Pharmacy Consult - MTM   Does not apply Daily Shady Patel [facility]       • polyethylene glycol (MIRALAX) packet 17 g  17 g Oral Daily PRN Bayron Diaz MD       • potassium chloride (MICRO-K) CR capsule 40 mEq  40 mEq Oral PRN Brenton Yu PA   40 mEq at 11/02/22 1402    Or   • potassium chloride (KLOR-CON) packet 40 mEq  40 mEq Oral PRN Brenton Yu PA       • potassium chloride 20 mEq in 50 mL IVPB  20 mEq Intravenous Q1H PRN Brenton Yu PA        Or   • potassium chloride 20 mEq in 50 mL IVPB  20 mEq Intravenous Q1H PRN  Brenton Yu PA       • sennosides-docusate (PERICOLACE) 8.6-50 MG per tablet 2 tablet  2 tablet Oral BID Kerwin Bella MD   2 tablet at 11/04/22 0859   • sodium chloride 0.9 % flush 10 mL  10 mL Intravenous Q12H Brenton Yu PA   10 mL at 11/03/22 2125   • sodium chloride 0.9 % flush 10 mL  10 mL Intravenous PRN Brenton Yu PA            Physician Progress Notes (last 24 hours)      Bayron Diaz MD at 11/04/22 0625          CTS Progress Note       LOS: 8 days   Patient Care Team:  Osiel Peña MD as PCP - General (Family Medicine)    Chief Complaint: NSTEMI (non-ST elevated myocardial infarction) ([facility])    Vital Signs:  Temp:  [98.2 °F (36.8 °C)-98.8 °F (37.1 °C)] 98.8 °F (37.1 °C)  Heart Rate:  [64-80] 76  Resp:  [16-20] 16  BP: (109-168)/(62-85) 122/65    Physical Exam: More cooperative and ambulatory       Results:   Results from last 7 days   Lab Units 11/04/22  0134   WBC 10*3/mm3 10.08   HEMOGLOBIN g/dL 10.2*   HEMATOCRIT % 32.3*   PLATELETS 10*3/mm3 264     Results from last 7 days   Lab Units 11/04/22  0134   SODIUM mmol/L 139   POTASSIUM mmol/L 3.9   CHLORIDE mmol/L 103   CO2 mmol/L 30.0*   BUN mg/dL 14   CREATININE mg/dL 0.55*   GLUCOSE mg/dL 112*   CALCIUM mg/dL 8.3*           Imaging Results (Last 24 Hours)     Procedure Component Value Units Date/Time    SLP FEES - Fiberoptic Endo Eval Swallow [[id number]] Resulted: 11/03/22 1137     Updated: 11/03/22 1137    Narrative:      This procedure was auto-finalized with no dictation required.          Assessment      I21.4 CABG 10/28/22    I25.10 MV CAD s/p stents     E11.9 T2DM     F19.90 Illicit drug use    I10 Hypertension    E78.5 Dyslipidemia    K21.9 GERD    Z98.84 Hx of laparoscopic gastric banding    Z72 Tobacco use        Plan   Transfer to telemetry.  Delete oral narcotics from transfer orders  Likely not home until Monday and will need Plavix prescription at discharge  Consideration of 1 week of inpatient rehab would  probably be best if patient would agree  to evaluate    Please note that portions of this note were completed with a voice recognition program. Efforts were made to edit the dictations, but occasionally words are mistranscribed.    Bayron Diaz MD  11/04/22  06:25 EDT        Electronically signed by Bayron Diaz MD at 11/04/22 0626     Kerwin Bella MD at 11/03/22 1157          Pulmonary/Critical Care Follow-up     LOS: 7 days   Patient Care Team:  Osiel Peña MD as PCP - General (Family Medicine)    Chief Complaint/reason: NSTEMI/medical management of issues postoperatively after CABG including, but not limited to: Respiratory, hemic, and electrolyte management.      Subjective      History reviewed and updated as indicated.    Interval History:     Patient is a bit drowsy this morning.  Overall in better spirits today.  Feeling better with no nausea/vomiting.  Magnesium being replaced.  Had a bowel movement.  Plan for FEES today.      History taken from: Patient, staff    PMH/FH/Social History were reviewed and updated appropriately in the electronic medical record.     Review of Systems:    Review of 14 systems was completed with positives and pertinent negatives noted in the subjective section.  All other systems reviewed and are negative.         Objective     Vital Signs  Temp:  [97.7 °F (36.5 °C)-98.9 °F (37.2 °C)] 98.7 °F (37.1 °C)  Heart Rate:  [63-80] 79  Resp:  [12-20] 20  BP: (100-143)/(58-94) 122/68  11/02 0701 - 11/03 0700  In: 536 [P.O.:474; I.V.:62]  Out: 625 [Urine:625]  Body mass index is 32.7 kg/m².     IV drips:      Physical Exam:     Constitutional:   Alert, in no acute distress   Head:   Normocephalic, atraumatic   Eyes:           Lids and lashes normal, conjunctivae and sclerae normal.  PER   ENMT:  Ears appear intact with no abnormalities noted     Lips normal.     Neck:  Trachea midline, no JVD   Lungs/Resp:    Normal effort, symmetric chest rise, no  crepitus, diminished breath sounds at bases bilaterally bilaterally.               Heart/CV:   Regular rhythm and normal rate, no murmur   Abdomen/GI:    Soft, nontender, nondistended   :    Deferred   Extremities/MSK:  No clubbing or cyanosis.  Trace bilateral lower extremity edema.     Pulses:  Pulses palpable and equal bilaterally   Skin:  No bleeding, bruising or rash.  Multiple tattoos.   Heme/Lymph:  No cervical or supraclavicular adenopathy.   Neurologic:    Psychiatric:    Moves all extremities with no obvious focal motor deficit.  Cranial nerves 2 - 12 grossly intact  Non-agitated, normal affect.    The above physical exam findings were reviewed and reflect my exam findings as of today's exam.   Electronically signed by:  Kerwin Bella MD  11/03/22  11:57 EDT      Results Review:     I reviewed the patient's new clinical results.   Results from last 7 days   Lab Units 11/03/22  0239 11/02/22  0334 11/01/22  0342 10/28/22  1621 10/28/22  0012   SODIUM mmol/L 141 141 137   < > 139   POTASSIUM mmol/L 4.3 3.4* 3.8   < > 4.3   CHLORIDE mmol/L 102 100 100   < > 101   CO2 mmol/L 33.0* 32.0* 31.0*   < > 26.0   BUN mg/dL 15 18 19   < > 15   CREATININE mg/dL 0.56* 0.69 0.67   < > 0.80   CALCIUM mg/dL 8.3* 8.7 8.2*   < > 9.4   BILIRUBIN mg/dL  --   --   --   --  0.4   ALK PHOS U/L  --   --   --   --  110   ALT (SGPT) U/L  --   --   --   --  22   AST (SGOT) U/L  --   --   --   --  21   GLUCOSE mg/dL 150* 145* 144*   < > 382*    < > = values in this interval not displayed.     Results from last 7 days   Lab Units 11/03/22  0239 11/01/22  0342 10/31/22  0308   WBC 10*3/mm3 7.64 10.40 10.04   HEMOGLOBIN g/dL 9.5* 10.0* 9.6*   HEMATOCRIT % 30.4* 31.5* 30.4*   PLATELETS 10*3/mm3 203 180 132*     Results from last 7 days   Lab Units 10/29/22  1418   PH, ARTERIAL pH units 7.400   PO2 ART mm Hg 62.7*   PCO2, ARTERIAL mm Hg 45.3*   HCO3 ART mmol/L 28.0*     Results from last 7 days   Lab Units 11/03/22  1054  11/02/22  0334 11/01/22  0342 10/31/22  0308   MAGNESIUM mg/dL 1.8 2.1 1.8 2.0   PHOSPHORUS mg/dL 3.3 3.7  --  3.2       I reviewed the patient's new imaging including images and reports.    Medication Review:   acetaminophen, 650 mg, Oral, Q8H  aspirin, 324 mg, Oral, Daily  atorvastatin, 40 mg, Oral, Nightly  clopidogrel, 75 mg, Oral, Daily  escitalopram, 10 mg, Oral, Daily  heparin (porcine), 5,000 Units, Subcutaneous, Q8H  insulin detemir, 5 Units, Subcutaneous, Daily  insulin lispro, 0-7 Units, Subcutaneous, 4x Daily With Meals & Nightly  Insulin Lispro, 2 Units, Subcutaneous, TID With Meals  ipratropium-albuterol, 3 mL, Nebulization, 4x Daily - RT  lisinopril, 10 mg, Oral, Q24H  metoprolol tartrate, 12.5 mg, Oral, Q12H  pantoprazole, 40 mg, Intravenous, Q AM  pharmacy consult - [place], , Does not apply, Daily  senna-docusate sodium, 2 tablet, Oral, BID  sodium chloride, 10 mL, Intravenous, Q12H           Assessment & Plan         I21.4 CABG 10/28/22    I25.10 MV CAD s/p stents     E11.9 T2DM     F19.90 Illicit drug use    I10 Hypertension    E78.5 Dyslipidemia    K21.9 GERD    Z98.84 Hx of laparoscopic gastric banding    Z72 Tobacco use    62 y.o. female admitted in transfer from [facility] on October 27 with initial complaint of exertional chest pain and found to have a non-STEMI.  Patient has known coronary artery disease and prior stents.  CTA chest at outside hospital was negative for pulmonary embolism.  Urine drug screen was positive for cocaine, amphetamine, and methamphetamine.  Outside hospital left heart catheterization showed multivessel coronary disease.  She was transferred for surgical revascularization.  She underwent CABG x3 by Dr. Diaz on 10/28/2022.  Patient was extubated on October 29, 2022.    Patient doing better today.  Plan for FEES today.  Adequate oxygenation on 2 L nasal cannula.  Blood sugars adequately controlled.    Plan:  1.  For coronary artery disease:  Status post CABG.  Postoperative surgical management per CT surgery.  2.  For diabetes: Adequately controlled.  Continue Levemir and correction insulin.  Continue low-dose prandial insulin.  3.  For depression: Continue Lexapro.  4.  Respiratory: Wean supplemental oxygen as tolerated.  Mobilize.  Encourage incentive spirometer use.  Diuresis as tolerated.  Additional doses ordered.  5.  For tobacco use: Encouraged smoking cessation.  6.  For agitation/illicit drug use: Patient has remained off of Precedex.  Chemical dependency consult ongoing.  7.  For electrolyte disturbance: Replace and monitor.    Electronically signed by:    Kerwin Bella MD  22  11:57 EDT      *. Please note that portions of this note were completed with [employer] voice recognition program.     Electronically signed by Kerwin Bella MD at 22 1206          Physical Therapy Notes (most recent note)      Harleen Das, PT at 22 0934  Version 1 of 1         Patient Name: Shantel Ríos  : 1960    MRN: [id number]                              Today's Date: 11/3/2022       Admit Date: 10/27/2022    Visit Dx:     ICD-10-CM ICD-9-CM   1. Oropharyngeal dysphagia  R13.12 787.22   2. Encounter for examination for normal comparison and control in clinical research program  Z00.6 V70.7     Patient Active Problem List   Diagnosis   • I25.10 MV CAD s/p stents    • E11.9 T2DM    • F19.90 Illicit drug use   • I21.4 CABG 10/28/22   • I10 Hypertension   • E78.5 Dyslipidemia   • K21.9 GERD   • Z98.84 Hx of laparoscopic gastric banding   • Z72 Tobacco use     Past Medical History:   Diagnosis Date   • Anxiety    • CAD (coronary artery disease)    • Cancer of stomach (HCC)     Hx of stomach and uterine ca   • Depression    • Diabetes mellitus (HCC)     Type 2   • GERD (gastroesophageal reflux disease)    • History of radical hysterectomy    • Hyperlipidemia    • Hypertension    • MI (mitral incompetence)     Hx    • Pancreatitis     Chronic   • PTSD (post-traumatic stress disorder)    • Tumors     Stomach tumors     Past Surgical History:   Procedure Laterality Date   • CARDIOVASCULAR STRESS TEST  11/08/2014    PATSY Lantiguaview-([facility]. Dr. Floyd) EF44%. Inferolateral Infarct with Ischemia.   • CARDIOVASCULAR STRESS TEST  03/10/2015    L. Myoview:EF48% inferior ischemia, lateral infarctm ranexa added   • CATH LAB PROCEDURE  2000    Cath-(FI)7 stents   • CATH LAB PROCEDURE  2004    Cath-(Vand) 1 stent   • CATH LAB PROCEDURE  03/12/2014    Cath-(Dr. Floyd) 50-70%LAD. 30-50%Instent stenosis of RCA.   • CORONARY ARTERY BYPASS GRAFT N/A 10/28/2022    Procedure: MEDIAN STERNOTOMY, CORONARY ARTERY BYPASS GRAFTING X 3, UTILIZING THE LEFT INTERNAL MAMMARY ARTERY, ENDOSCOPIC VEIN HARVEST OF THE RIGHT GREATER SAPHENOUS VEIN;  Surgeon: Bayron Diaz MD;  [facility];  Service: Cardiothoracic;  Laterality: N/A;   • ECHO - CONVERTED  11/07/2014    Echo-([facility]. ) Mild depressed EF   • ECHO - CONVERTED  03/10/2015    Echo: EF: 50-55%, RVSP 19mmHg, mild MR   • HYSTERECTOMY      Radical      General Information     Row Name 11/03/22 1419          Physical Therapy Time and Intention    Document Type therapy note (daily note)  -KG     Mode of Treatment physical therapy  -KG     Row Name 11/03/22 1419          General Information    Existing Precautions/Restrictions cardiac;fall;oxygen therapy device and L/min;sternal  -KG     Row Name 11/03/22 1419          Cognition    Orientation Status (Cognition) oriented to;person;place  -KG     Row Name 11/03/22 1419          Safety Issues, Functional Mobility    Safety Issues Affecting Function (Mobility) ability to follow commands;at risk behavior observed;awareness of need for assistance;impulsivity;insight into deficits/self-awareness;safety precaution awareness;safety precautions follow-through/compliance;sequencing abilities  -KG     Impairments Affecting Function (Mobility)  balance;cognition;coordination;endurance/activity tolerance;motor control;motor planning;pain;postural/trunk control;shortness of breath;strength  -KG     Cognitive Impairments, Mobility Safety/Performance attention;awareness, need for assistance;impulsivity;insight into deficits/self-awareness;safety precaution awareness;safety precaution follow-through;sequencing abilities  -KG           User Key  (r) = Recorded By, (t) = Taken By, (c) = Cosigned By    Initials Name Provider Type    KG Harleen Das, PT Physical Therapist               Mobility     Row Name 11/03/22 1420          Bed Mobility    Bed Mobility scooting/bridging;sit-supine  -KG     Scooting/Bridging [place] (Bed Mobility) maximum assist (25% patient effort);2 person assist;verbal cues  -KG     Sit-Supine [place] (Bed Mobility) moderate assist (50% patient effort);2 person assist;verbal cues  -KG     Assistive Device (Bed Mobility) draw sheet;head of bed elevated  -KG     Comment, (Bed Mobility) VC's for sequencing and safe hand placement to maintain sternal precautions. Pt required assistance at trunk and BLEs.  -KG     Row Name 11/03/22 1420          Transfers    Comment, (Transfers) Max cueing for sequencing and safe hand placement. Pt continues to be non-compliant with sternal precautions. Toilet transfer to [facility] with Seth.  -KG     Row Name 11/03/22 1420          Bed-Chair Transfer    Bed-Chair [place] (Transfers) contact guard;verbal cues  -KG     Row Name 11/03/22 1420          Sit-Stand Transfer    Sit-Stand [place] (Transfers) minimum assist (75% patient effort);verbal cues  -KG     Assistive Device (Sit-Stand Transfers) other (see comments)  -KG     Row Name 11/03/22 1420          Gait/Stairs (Locomotion)    [place] Level (Gait) minimum assist (75% patient effort);verbal cues  -KG     Assistive Device (Gait) other (see comments)  B UE support on tele monitor  -KG     Distance in Feet (Gait) 50  -KG      Deviations/Abnormal Patterns (Gait) bilateral deviations;richard decreased;festinating/shuffling;stride length decreased  -KG     Bilateral Gait Deviations forward flexed posture;heel strike decreased  -KG     Comment, (Gait/Stairs) Pt demonstrated step through gait pattern with slow richard and decreased step length. Pt demonstrated adequate balance and stability. VC's for upright posture and increased stride length. Pt with minimal motivation; refused additional ambulation despite max encouragement. Limited by c/o increased incisional pain.  -KG           User Key  (r) = Recorded By, (t) = Taken By, (c) = Cosigned By    Initials Name Provider Type    KG Harleen Das, PT Physical Therapist               Obj/Interventions     Row Name 11/03/22 1422          Balance    Balance Assessment sitting static balance;standing static balance;standing dynamic balance  -KG     Static Sitting Balance standby assist  -KG     Position, Sitting Balance unsupported;sitting in chair  -KG     Static Standing Balance contact guard  -KG     Dynamic Standing Balance minimal assist  -KG     Position/Device Used, Standing Balance supported  -KG           User Key  (r) = Recorded By, (t) = Taken By, (c) = Cosigned By    Initials Name Provider Type    KG Harleen Das, PT Physical Therapist               Goals/Plan    No documentation.                Clinical Impression     Row Name 11/03/22 1423          Pain    Pretreatment Pain Rating 10/10  -KG     Posttreatment Pain Rating 10/10  -KG     Pain Location incisional  -KG     Pain Location - chest  -KG     Pain Intervention(s) Repositioned;Ambulation/increased activity  -KG     Row Name 11/03/22 1423          Plan of Care Review    Plan of Care Reviewed With patient  -KG     Progress no change  -KG     Outcome Evaluation Pt ambulated 50ft with Seth and B UE support on tele monitor. Frequent cues for upright posture with forward gaze and increased stride length. Pt declined  additional ambulation despite max encouragement. Limited by c/o increased incisional pain. Continue to progress as appropriate.  -KG     Row Name 11/03/22 1423          Vital Signs    Pre Systolic BP Rehab 114  -KG     Pre Treatment Diastolic BP 71  -KG     Post Systolic BP Rehab 117  -KG     Post Treatment Diastolic BP 74  -KG     Pretreatment Heart Rate (beats/min) 82  -KG     Posttreatment Heart Rate (beats/min) 84  -KG     Pre SpO2 (%) 92  -KG     O2 Delivery Pre Treatment supplemental O2  -KG     Post SpO2 (%) 90  -KG     O2 Delivery Post Treatment supplemental O2  -KG     Pre Patient Position Sitting  -KG     Intra Patient Position Standing  -KG     Post Patient Position Supine  -KG     Row Name 11/03/22 1423          Positioning and Restraints    Pre-Treatment Position sitting in chair/recliner  -KG     Post Treatment Position bed  -KG     In Bed notified nsg;supine;call light within reach;encouraged to call for assist;exit alarm on;side rails up x3;RUE elevated;LUE elevated  -KG           User Key  (r) = Recorded By, (t) = Taken By, (c) = Cosigned By    Initials Name Provider Type    Harleen Pizarro, PT Physical Therapist               Outcome Measures     Row Name 11/03/22 1424 11/03/22 0800       How much help from another person do you currently need...    Turning from your back to your side while in flat bed without using bedrails? 2  -KG 2  -JJ    Moving from lying on back to sitting on the side of a flat bed without bedrails? 2  -KG 2  -JJ    Moving to and from a bed to a chair (including a wheelchair)? 3  -KG 3  -JJ    Standing up from a chair using your arms (e.g., wheelchair, bedside chair)? 3  -KG 3  -JJ    Climbing 3-5 steps with a railing? 2  -KG 2  -JJ    To walk in hospital room? 3  -KG 3  -JJ    AM-PAC 6 Clicks Score (PT) 15  -KG 15  -JJ    Highest level of mobility 4 --> Transferred to chair/commode  -KG 4 --> Transferred to chair/commode  -JJ    Row Name 11/03/22 1422           Functional Assessment    Outcome Measure Options AM-PAC 6 Clicks Basic Mobility (PT)  -KG           User Key  (r) = Recorded By, (t) = Taken By, (c) = Cosigned By    Initials Name Provider Type    Stoney Tello, RN Registered Nurse    Harleen Pizarro PT Physical Therapist                             Physical Therapy Education     Title: PT OT SLP Therapies (In Progress)     Topic: Physical Therapy (In Progress)     Point: Mobility training (In Progress)     Learning Progress Summary           Patient Acceptance, E, NR by KG at 11/3/2022 0934    Acceptance, E, NR by KG at 11/1/2022 0930    Acceptance, E, NR by KG at 10/31/2022 0907                   Point: Home exercise program (In Progress)     Learning Progress Summary           Patient Acceptance, E, NR by KG at 11/3/2022 0934    Acceptance, E, NR by KG at 11/1/2022 0930                   Point: Body mechanics (In Progress)     Learning Progress Summary           Patient Acceptance, E, NR by KG at 11/3/2022 0934    Acceptance, E, NR by KG at 11/1/2022 0930    Acceptance, E, NR by KG at 10/31/2022 0907                   Point: Precautions (In Progress)     Learning Progress Summary           Patient Acceptance, E, NR by KG at 11/3/2022 0934    Acceptance, E, NR by KG at 11/1/2022 0930    Acceptance, E, NR by KG at 10/31/2022 0907                               User Key     Initials Effective Dates Name Provider Type Discipline    KG 05/22/20 -  Harleen Das PT Physical Therapist PT              PT Recommendation and Plan  Planned Therapy Interventions (PT): balance training, bed mobility training, gait training, strengthening, transfer training  Plan of Care Reviewed With: patient  Progress: no change  Outcome Evaluation: Pt ambulated 50ft with Seth and B UE support on tele monitor. Frequent cues for upright posture with forward gaze and increased stride length. Pt declined additional ambulation despite max encouragement. Limited by c/o  increased incisional pain. Continue to progress as appropriate.     Time Calculation:    PT Charges     Row Name 22 0934             Time Calculation    Start Time 0934  -KG      PT Received On 22  -KG      PT Goal Re-Cert Due Date 11/10/22  -KG         Time Calculation- PT    Total Timed Code Minutes- PT 25 minute(s)  -KG         Timed Charges    67153 - PT Therapeutic Activity Minutes 25  -KG         Total Minutes    Timed Charges Total Minutes 25  -KG       Total Minutes 25  -KG            User Key  (r) = Recorded By, (t) = Taken By, (c) = Cosigned By    Initials Name Provider Type    KG Harleen Das, PT Physical Therapist              Therapy Charges for Today     Code Description Service Date Service Provider Modifiers Qty    25617217583 HC PT THERAPEUTIC ACT EA 15 MIN 11/3/2022 Harleen Das, PT GP 2    01665694352 HC PT THER SUPP EA 15 MIN 11/3/2022 Harleen Das, PT GP 2          PT G-Codes  Outcome Measure Options: AM-PAC 6 Clicks Basic Mobility (PT)  AM-PAC 6 Clicks Score (PT): 15  AM-PAC 6 Clicks Score (OT): 13    Cece Das PT  11/3/2022      Electronically signed by Harleen Das PT at 22 1426          Occupational Therapy Notes (most recent note)      Tiffany Wahl, OT at 22 1413          Patient Name: Shantel Ríos  : 1960    MRN: [id number]                              Today's Date: 11/3/2022       Admit Date: 10/27/2022    Visit Dx:     ICD-10-CM ICD-9-CM   1. Oropharyngeal dysphagia  R13.12 787.22   2. Encounter for examination for normal comparison and control in clinical research program  Z00.6 V70.7     Patient Active Problem List   Diagnosis   • I25.10 MV CAD s/p stents    • E11.9 T2DM    • F19.90 Illicit drug use   • I21.4 CABG 10/28/22   • I10 Hypertension   • E78.5 Dyslipidemia   • K21.9 GERD   • Z98.84 Hx of laparoscopic gastric banding   • Z72 Tobacco use     Past Medical History:   Diagnosis Date   • Anxiety     • CAD (coronary artery disease)    • Cancer of stomach (HCC)     Hx of stomach and uterine ca   • Depression    • Diabetes mellitus (HCC)     Type 2   • GERD (gastroesophageal reflux disease)    • History of radical hysterectomy    • Hyperlipidemia    • Hypertension    • MI (mitral incompetence)     Hx   • Pancreatitis     Chronic   • PTSD (post-traumatic stress disorder)    • Tumors     Stomach tumors     Past Surgical History:   Procedure Laterality Date   • CARDIOVASCULAR STRESS TEST  11/08/2014    L. Myoview-([facility]. Dr. Floyd) EF44%. Inferolateral Infarct with Ischemia.   • CARDIOVASCULAR STRESS TEST  03/10/2015    L. Myoview:EF48% inferior ischemia, lateral infarctm ranexa added   • CATH LAB PROCEDURE  2000    Cath-([facility])7 stents   • CATH LAB PROCEDURE  2004    Cath-([facility]) 1 stent   • CATH LAB PROCEDURE  03/12/2014    Cath-(Dr. Floyd) 50-70%LAD. 30-50%Instent stenosis of RCA.   • CORONARY ARTERY BYPASS GRAFT N/A 10/28/2022    Procedure: MEDIAN STERNOTOMY, CORONARY ARTERY BYPASS GRAFTING X 3, UTILIZING THE LEFT INTERNAL MAMMARY ARTERY, ENDOSCOPIC VEIN HARVEST OF THE RIGHT GREATER SAPHENOUS VEIN;  Surgeon: Bayron Diaz MD;  Location: [facility];  Service: Cardiothoracic;  Laterality: N/A;   • ECHO - CONVERTED  11/07/2014    Echo-([facility]. ) Mild depressed EF   • ECHO - CONVERTED  03/10/2015    Echo: EF: 50-55%, RVSP 19mmHg, mild MR   • HYSTERECTOMY      Radical      General Information     Row Name 11/03/22 1521          OT Time and Intention    Document Type therapy note (daily note)  -KF     Mode of Treatment occupational therapy  -KF     Row Name 11/03/22 1521          General Information    Patient Profile Reviewed yes  -KF     Existing Precautions/Restrictions cardiac;fall;oxygen therapy device and L/min;sternal  -KF     Barriers to Rehab medically complex;ineffective coping;previous functional deficit;cognitive status  -KF     Row Name 11/03/22 1521          Cognition    Orientation Status  (Cognition) oriented x 3;verbal cues/prompts needed for orientation;situation  -KF     Row Name 11/03/22 1521          Safety Issues, Functional Mobility    Safety Issues Affecting Function (Mobility) insight into deficits/self-awareness;safety precaution awareness;awareness of need for assistance;judgment;problem-solving  -KF     Impairments Affecting Function (Mobility) balance;cognition;coordination;endurance/activity tolerance;motor control;motor planning;pain;postural/trunk control;shortness of breath;strength  -KF     Cognitive Impairments, Mobility Safety/Performance awareness, need for assistance;problem-solving/reasoning;insight into deficits/self-awareness;judgment;safety precaution awareness;safety precaution follow-through  -KF           User Key  (r) = Recorded By, (t) = Taken By, (c) = Cosigned By    Initials Name Provider Type    KF Tiffany Wahl, OT Occupational Therapist                 Mobility/ADL's     Row Name 11/03/22 1522          Bed Mobility    Bed Mobility scooting/bridging;sidelying-sit;sit-sidelying;rolling left  -KF     Rolling Left [place] (Bed Mobility) minimum assist (75% patient effort);verbal cues  -KF     Scooting/Bridging [place] (Bed Mobility) moderate assist (50% patient effort);verbal cues;nonverbal cues (demo/gesture)  -KF     Sidelying-Sit [place] (Bed Mobility) moderate assist (50% patient effort);verbal cues  -KF     Sit-Sidelying [place] (Bed Mobility) minimum assist (75% patient effort);verbal cues  -KF     Bed Mobility, Safety Issues cognitive deficits limit understanding;decreased use of arms for pushing/pulling  -KF     Assistive Device (Bed Mobility) head of bed elevated;draw sheet  -KF     Comment, (Bed Mobility) cues for sequencing and progress with bed mobility while maintaining sternal precautions with mod VC's  -KF     Row Name 11/03/22 1522          Transfers    Comment, (Transfers) Pt reports nausea upon sitting and limited sitting  tolerance at this time, unable to progress towards STS at this time; Pt declined transfer to chair at this time  -KF           User Key  (r) = Recorded By, (t) = Taken By, (c) = Cosigned By    Initials Name Provider Type    Tiffany Benson, [place] Occupational Therapist               Obj/Interventions     Row Name 11/03/22 1524          Shoulder (Therapeutic Exercise)    Shoulder (Therapeutic Exercise) AAROM (active assistive range of motion)  -KF     Shoulder AAROM (Therapeutic Exercise) bilateral;flexion;extension;5 repetitions;supine  -KF     Row Name 11/03/22 1524          Elbow/Forearm (Therapeutic Exercise)    Elbow/Forearm (Therapeutic Exercise) AAROM (active assistive range of motion)  -KF     Elbow/Forearm AAROM (Therapeutic Exercise) bilateral;flexion;extension;10 repetitions;supine  -KF     Row Name 11/03/22 1524          Motor Skills    Therapeutic Exercise shoulder;elbow/forearm  -KF     Row Name 11/03/22 1524          Balance    Balance Assessment sitting static balance;sitting dynamic balance  -KF     Static Sitting Balance standby assist  -KF     Dynamic Sitting Balance contact guard  -KF     Position, Sitting Balance unsupported;sitting edge of bed  -KF     Balance Interventions sitting;weight shifting activity;minimal challenge  -KF     Comment, Balance cues for weightshifting without use of BUE for support and management of sternal precautions  -KF           User Key  (r) = Recorded By, (t) = Taken By, (c) = Cosigned By    Initials Name Provider Type    Tiffany Benson, OT Occupational Therapist               Goals/Plan    No documentation.                Clinical Impression     Row Name 11/03/22 1525          Pain Assessment    Pretreatment Pain Rating 10/10  -KF     Posttreatment Pain Rating 10/10  -KF     Pain Location incisional  -KF     Pain Location - chest  -KF     Pre/Posttreatment Pain Comment RN aware and managing  -KF     Pain Intervention(s) Repositioned  -     Row Name  11/03/22 1525          Plan of Care Review    Plan of Care Reviewed With patient  -KF     Progress no change  -KF     Outcome Evaluation Pt with brief tolerance seated EOB with modA for completion of bed mobility and mod verbal cues for sequencing in order to manage sternal precautions safely, unable to maintain sitting in order to complete BUE ther ex and completed once returned to supine, cont IPOT per POC recom SNF at d/c  -     Row Name 11/03/22 1525          Therapy Assessment/Plan (OT)    Rehab Potential (OT) good, to achieve stated therapy goals  -KF     Criteria for Skilled Therapeutic Interventions Met (OT) yes;meets criteria;skilled treatment is necessary  -KF     Therapy Frequency (OT) daily  -KF     Row Name 11/03/22 1525          Therapy Plan Review/Discharge Plan (OT)    Anticipated Discharge Disposition (OT) [facility] nursing facility  -     Row Name 11/03/22 1525          Vital Signs    Pre Systolic BP Rehab 121  RN cleared VSS  -KF     Pre Treatment Diastolic BP 65  -KF     Pre SpO2 (%) 97  -KF     O2 Delivery Pre Treatment supplemental O2  2L NC  -KF     Pre Patient Position Supine  -KF     Intra Patient Position Sitting  -KF     Post Patient Position Supine  -KF     Rest Breaks  1  -KF     Row Name 11/03/22 1525          Positioning and Restraints    Pre-Treatment Position in bed  -KF     Post Treatment Position bed  -KF     In Bed exit alarm on;notified nsg;supine;fowlers;call light within reach;encouraged to call for assist;legs elevated;heels elevated  -KF           User Key  (r) = Recorded By, (t) = Taken By, (c) = Cosigned By    Initials Name Provider Type    KF Tiffany Wahl, OT Occupational Therapist               Outcome Measures     Row Name 11/03/22 1528 11/03/22 1521       How much help from another is currently needed...    Putting on and taking off regular lower body clothing? --  -KF 2  -KF    Bathing (including washing, rinsing, and drying) --  -KF 2  -KF    Toileting (which  includes using toilet bed pan or urinal) --  -KF 2  -KF    Putting on and taking off regular upper body clothing --  -KF 2  -KF    Taking care of personal grooming (such as brushing teeth) --  -KF 3  -KF    Eating meals --  -KF 3  -KF    AM-PAC 6 Clicks Score (OT) --  -KF 14  -KF    Row Name 11/03/22 1424 11/03/22 0800       How much help from another person do you currently need...    Turning from your back to your side while in flat bed without using bedrails? 2  -KG 2  -JJ    Moving from lying on back to sitting on the side of a flat bed without bedrails? 2  -KG 2  -JJ    Moving to and from a bed to a chair (including a wheelchair)? 3  -KG 3  -JJ    Standing up from a chair using your arms (e.g., wheelchair, bedside chair)? 3  -KG 3  -JJ    Climbing 3-5 steps with a railing? 2  -KG 2  -JJ    To walk in hospital room? 3  -KG 3  -JJ    AM-PAC 6 Clicks Score (PT) 15  -KG 15  -JJ    Highest level of mobility 4 --> Transferred to chair/commode  -KG 4 --> Transferred to chair/commode  -JJ    Row Name 11/03/22 1528 11/03/22 1521       Functional Assessment    Outcome Measure Options --  -KF AM-PAC 6 Clicks Daily Activity (OT)  -    Row Name 11/03/22 1424          Functional Assessment    Outcome Measure Options AM-PAC 6 Clicks Basic Mobility (PT)  -KG           User Key  (r) = Recorded By, (t) = Taken By, (c) = Cosigned By    Initials Name Provider Type    Stoney Tello, RN Registered Nurse    KG Harleen Das, PT Physical Therapist    KF Tiffany Wahl, OT Occupational Therapist                Occupational Therapy Education     Title: PT OT SLP Therapies (In Progress)     Topic: Occupational Therapy (In Progress)     Point: ADL training (In Progress)     Description:   Instruct learner(s) on proper safety adaptation and remediation techniques during self care or transfers.   Instruct in proper use of assistive devices.              Learning Progress Summary           Patient Acceptance, E,TB,D, NR by SALTY  at 11/3/2022 1529    Acceptance, E,TB,D, NR by KF at 11/3/2022 1521    Acceptance, E,D,TB, VU,DU,NR by KF at 11/1/2022 1005                   Point: Home exercise program (In Progress)     Description:   Instruct learner(s) on appropriate technique for monitoring, assisting and/or progressing therapeutic exercises/activities.              Learning Progress Summary           Patient Acceptance, E,TB,D, NR by  at 11/3/2022 1529    Acceptance, E,TB,D, NR by KF at 11/3/2022 1521                   Point: Precautions (In Progress)     Description:   Instruct learner(s) on prescribed precautions during self-care and functional transfers.              Learning Progress Summary           Patient Acceptance, E,TB,D, NR by  at 11/3/2022 1529    Acceptance, E,TB,D, NR by KF at 11/3/2022 1521    Acceptance, E,D,TB, VU,DU,NR by KF at 11/1/2022 1005                   Point: Body mechanics (In Progress)     Description:   Instruct learner(s) on proper positioning and spine alignment during self-care, functional mobility activities and/or exercises.              Learning Progress Summary           Patient Acceptance, E,TB,D, NR by  at 11/3/2022 1529    Acceptance, E,TB,D, NR by  at 11/3/2022 1521    Acceptance, E,D,TB, VU,DU,NR by  at 11/1/2022 1005                               User Key     Initials Effective Dates Name Provider Type Discipline     06/16/21 -  Tiffany Wahl, OT Occupational Therapist OT              OT Recommendation and Plan  Planned Therapy Interventions (OT): activity tolerance training, adaptive equipment training, BADL retraining, occupation/activity based interventions, strengthening exercise, transfer/mobility retraining, functional balance retraining, ROM/therapeutic exercise, patient/caregiver education/training  Therapy Frequency (OT): daily  Plan of Care Review  Plan of Care Reviewed With: patient  Progress: no change  Outcome Evaluation: Pt with brief tolerance seated EOB with modA for  completion of bed mobility and mod verbal cues for sequencing in order to manage sternal precautions safely, unable to maintain sitting in order to complete BUE ther ex and completed once returned to supine, cont IPOT per POC recom SNF at d/c     Time Calculation:    Time Calculation- OT     Row Name 11/03/22 1413             Time Calculation- OT    OT Start Time 1413  -KF      OT Received On 11/03/22  -KF         Timed Charges    61933 - OT Therapeutic Exercise Minutes 5  -KF      16923 - OT Therapeutic Activity Minutes 6  -KF         Total Minutes    Timed Charges Total Minutes 11  -KF       Total Minutes 11  -KF            User Key  (r) = Recorded By, (t) = Taken By, (c) = Cosigned By    Initials Name Provider Type    KF Tiffany Wahl OT Occupational Therapist              Therapy Charges for Today     Code Description Service Date Service Provider Modifiers Qty    [id number]  OT THERAPEUTIC ACT EA 15 MIN 11/3/2022 Tiffany Wahl OT GO 1               Tiffany Wahl OT  11/3/2022    Electronically signed by Tiffany Wahl OT at 11/03/22 1535

## 2022-11-04 NOTE — CASE MANAGEMENT/SOCIAL WORK
Continued Stay Note   [place]     Patient Name: Shantel Ríos  MRN: [id number]  Today's Date: 11/4/2022    Admit Date: 10/27/2022    Plan: SNF   Discharge Plan     Row Name 11/04/22 1007       Plan    Plan SNF    Patient/Family in Agreement with Plan yes    Plan Comments Patient has orders to the floor. Updated notes faxed to [facility]. CM will continue to follow.    Final Discharge Disposition Code 03 - skilled nursing facility (SNF)               Discharge Codes    No documentation.               Expected Discharge Date and Time     Expected Discharge Date Expected Discharge Time    Nov 6, 2022             Jame Dueñas RN

## 2022-11-04 NOTE — PROGRESS NOTES
Pulmonary/Critical Care Follow-up     LOS: 8 days   Patient Care Team:  Osiel Peña MD as PCP - General (Family Medicine)    Chief Complaint/reason: NSTEMI/medical management of issues postoperatively after CABG including, but not limited to: Respiratory, hemic, and electrolyte management.      Subjective      History reviewed and updated as indicated.    Interval History:     More awake today.  Ambulated in the robles.  No fevers or chills.  No nausea or vomiting.  Ordered to floor.  Patient is a bit drowsy this morning.  Overall in better spirits today.  Feeling better with no nausea/vomiting.  FEES yesterday, still requiring nectar thick liquids.    History taken from: Patient, staff    PMH/FH/Social History were reviewed and updated appropriately in the electronic medical record.     Review of Systems:    Review of 14 systems was completed with positives and pertinent negatives noted in the subjective section.  All other systems reviewed and are negative.         Objective     Vital Signs  Temp:  [98.2 °F (36.8 °C)-98.8 °F (37.1 °C)] 98.3 °F (36.8 °C)  Heart Rate:  [60-78] 71  Resp:  [16-20] 18  BP: (112-168)/(60-85) 122/60  No intake/output data recorded.  Body mass index is 32.7 kg/m².     IV drips:      Physical Exam:     Constitutional:   Alert, in no acute distress   Head:   Normocephalic, atraumatic   Eyes:           Lids and lashes normal, conjunctivae and sclerae normal.  PER   ENMT:  Ears appear intact with no abnormalities noted     Lips normal.     Neck:  Trachea midline, no JVD   Lungs/Resp:    Normal effort, symmetric chest rise, no crepitus, diminished breath sounds at bases bilaterally bilaterally.               Heart/CV:   Regular rhythm and normal rate, no murmur   Abdomen/GI:    Soft, nontender, nondistended   :    Deferred   Extremities/MSK:  No clubbing or cyanosis.  Trace bilateral lower extremity edema.     Pulses:  Pulses palpable and equal bilaterally   Skin:  No bleeding, bruising  or rash.  Multiple tattoos.   Heme/Lymph:  No cervical or supraclavicular adenopathy.   Neurologic:    Psychiatric:    Moves all extremities with no obvious focal motor deficit.  Cranial nerves 2 - 12 grossly intact  Non-agitated, normal affect.    The above physical exam findings were reviewed and reflect my exam findings as of today's exam.   Electronically signed by:  Kerwin Bella MD  11/04/22  14:39 EDT      Results Review:     I reviewed the patient's new clinical results.   Results from last 7 days   Lab Units 11/04/22  0134 11/03/22  0239 11/02/22  0334   SODIUM mmol/L 139 141 141   POTASSIUM mmol/L 3.9 4.3 3.4*   CHLORIDE mmol/L 103 102 100   CO2 mmol/L 30.0* 33.0* 32.0*   BUN mg/dL 14 15 18   CREATININE mg/dL 0.55* 0.56* 0.69   CALCIUM mg/dL 8.3* 8.3* 8.7   GLUCOSE mg/dL 112* 150* 145*     Results from last 7 days   Lab Units 11/04/22  0134 11/03/22  0239 11/01/22  0342   WBC 10*3/mm3 10.08 7.64 10.40   HEMOGLOBIN g/dL 10.2* 9.5* 10.0*   HEMATOCRIT % 32.3* 30.4* 31.5*   PLATELETS 10*3/mm3 264 203 180     Results from last 7 days   Lab Units 10/29/22  1418   PH, ARTERIAL pH units 7.400   PO2 ART mm Hg 62.7*   PCO2, ARTERIAL mm Hg 45.3*   HCO3 ART mmol/L 28.0*     Results from last 7 days   Lab Units 11/04/22  0134 11/03/22  0239 11/02/22  0334   MAGNESIUM mg/dL 1.7 1.8 2.1   PHOSPHORUS mg/dL 3.4 3.3 3.7       I reviewed the patient's new imaging including images and reports.    Medication Review:   acetaminophen, 650 mg, Oral, Q8H  aspirin, 324 mg, Oral, Daily  atorvastatin, 40 mg, Oral, Nightly  clopidogrel, 75 mg, Oral, Daily  escitalopram, 10 mg, Oral, Daily  heparin (porcine), 5,000 Units, Subcutaneous, Q8H  insulin detemir, 5 Units, Subcutaneous, Daily  insulin lispro, 0-7 Units, Subcutaneous, 4x Daily With Meals & Nightly  Insulin Lispro, 2 Units, Subcutaneous, TID With Meals  ipratropium-albuterol, 3 mL, Nebulization, 4x Daily - RT  lisinopril, 10 mg, Oral, Q24H  metoprolol tartrate, 12.5 mg,  Oral, Q12H  pantoprazole, 40 mg, Intravenous, Q AM  pharmacy consult - [facility], , Does not apply, Daily  senna-docusate sodium, 2 tablet, Oral, BID  sodium chloride, 10 mL, Intravenous, Q12H           Assessment & Plan         I21.4 CABG 10/28/22    I25.10 MV CAD s/p stents     E11.9 T2DM     F19.90 Illicit drug use    I10 Hypertension    E78.5 Dyslipidemia    K21.9 GERD    Z98.84 Hx of laparoscopic gastric banding    Z72 Tobacco use    62 y.o. female admitted in transfer from [facility] on October 27 with initial complaint of exertional chest pain and found to have a non-STEMI.  Patient has known coronary artery disease and prior stents.  CTA chest at outside hospital was negative for pulmonary embolism.  Urine drug screen was positive for cocaine, amphetamine, and methamphetamine.  Outside hospital left heart catheterization showed multivessel coronary disease.  She was transferred for surgical revascularization.  She underwent CABG x3 by Dr. Diaz on 10/28/2022.  Patient was extubated on October 29, 2022.    Patient doing better today.  Plan for FEES today.  Adequate oxygenation on 2 L nasal cannula.  Blood sugars adequately controlled.    Plan:  1.  For coronary artery disease: Status post CABG.  Postoperative surgical management per CT surgery.  2.  For diabetes: Adequately controlled.  Continue Levemir and correction insulin.  Continue low-dose prandial insulin.  3.  For depression: Continue Lexapro.  4.  Respiratory: Wean supplemental oxygen as tolerated.  Mobilize.  Encourage incentive spirometer use.    5.  For tobacco use: Encouraged smoking cessation.  6.  For agitation/illicit drug use: Patient has remained off of Precedex.  Chemical dependency consult ongoing.  7.  For electrolyte disturbance: Monitor and replace as necessary.    Electronically signed by:    Kerwin Bella MD  11/04/22  14:39 EDT      *. Please note that portions of this note were completed with [employer] - a voice recognition program.

## 2022-11-04 NOTE — THERAPY TREATMENT NOTE
Patient Name: Shantel Ríos  : 1960    MRN: [id number]                              Today's Date: 2022       Admit Date: 10/27/2022    Visit Dx:     ICD-10-CM ICD-9-CM   1. Oropharyngeal dysphagia  R13.12 787.22   2. 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 Myoview-([facility]. Dr. Floyd) EF44%. Inferolateral Infarct with Ischemia.   • CARDIOVASCULAR STRESS TEST  03/10/2015    L. Myoview:EF48% inferior ischemia, lateral infarctm ranexa added   • CATH LAB PROCEDURE      Cath-(FI)7 stents   • CATH LAB PROCEDURE      Cath-([facility]) 1 stent   • CATH LAB PROCEDURE  2014    Cath-(Dr. Floyd) 50-70%LAD. 30-50%Instent stenosis of RCA.   • CORONARY ARTERY BYPASS GRAFT N/A 10/28/2022    Procedure: MEDIAN STERNOTOMY, CORONARY ARTERY BYPASS GRAFTING X 3, UTILIZING THE LEFT INTERNAL MAMMARY ARTERY, ENDOSCOPIC VEIN HARVEST OF THE RIGHT GREATER SAPHENOUS VEIN;  Surgeon: Bayron Diaz MD;  Location: [facility];  Service: Cardiothoracic;  Laterality: N/A;   • [facility]  [facility]    [facility]  [facility]   • ECHO - CONVERTED  03/10/2015    Echo: EF: 50-55%, RVSP 19mmHg, mild MR   • HYSTERECTOMY      Radical      General Information     Row Name 11/04/22 1105          Physical Therapy Time and Intention    Document Type therapy note (daily note)  -KG     Mode of Treatment physical therapy  -KG     Row Name 11/04/22 1105          General Information    Existing Precautions/Restrictions cardiac;fall;oxygen therapy device and L/min;sternal  -KG     Row Name 11/04/22 1105          Cognition    Orientation Status (Cognition) oriented x 3  -KG     Row Name 11/04/22 1105          Safety Issues, Functional Mobility    Safety Issues Affecting Function (Mobility) ability to follow commands;at risk behavior observed;awareness of need for assistance;impulsivity;insight into deficits/self-awareness;safety precaution awareness;safety precautions follow-through/compliance;sequencing abilities  -KG     Impairments Affecting Function (Mobility) balance;cognition;coordination;endurance/activity tolerance;motor control;motor planning;pain;postural/trunk control;shortness of breath;strength  -KG     Cognitive Impairments, Mobility Safety/Performance attention;awareness, need for assistance;impulsivity;insight into deficits/self-awareness;safety precaution awareness;safety precaution follow-through;sequencing abilities  -KG           User Key  (r) = Recorded By, (t) = Taken By, (c) = Cosigned By    Initials Name Provider Type    KG Harleen Das, PT Physical Therapist               Mobility     Row Name 11/04/22 1106          Bed Mobility    Comment, (Bed Mobility) Pt seated EOB upon arrival; UIC at end of treatment session.  -KG     Row Name 11/04/22 1106          Transfers    Comment, (Transfers) VC's for sequencing and safe hand placement. Pt continues to be non-compliant with sternal precautions despite cues. Toilet transfer to [facility] with Seth.  -KG     Row Name 11/04/22 1106          Sit-Stand Transfer    Sit-Stand  [place] (Transfers) contact guard;verbal cues  -KG     Row Name 11/04/22 1106          Gait/Stairs (Locomotion)    [place] Level (Gait) minimum assist (75% patient effort);verbal cues  -KG     Assistive Device (Gait) other (see comments)  B UE support on tele monitor  -KG     Distance in Feet (Gait) 140  -KG     Deviations/Abnormal Patterns (Gait) base of support, narrow;richard decreased;stride length decreased  -KG     Bilateral Gait Deviations forward flexed posture;heel strike decreased  -KG     Comment, (Gait/Stairs) Pt demonstrated step through gait pattern with slow richard and decreased step length. VC's for upright posture. Pt required intermittent assistance to navigate with tele monitor. Limited by periods of impulsiveness and poor safety awareness. Declined additional ambulation despite max encouragement.  -KG           User Key  (r) = Recorded By, (t) = Taken By, (c) = Cosigned By    Initials Name Provider Type    KG Harleen Das, PT Physical Therapist               Obj/Interventions     Row Name 11/04/22 1108          Balance    Balance Assessment sitting static balance;standing static balance;standing dynamic balance  -KG     Static Sitting Balance standby assist  -KG     Position, Sitting Balance unsupported;sitting in chair  -KG     Static Standing Balance contact guard  -KG     Dynamic Standing Balance minimal assist  -KG     Position/Device Used, Standing Balance supported  -KG           User Key  (r) = Recorded By, (t) = Taken By, (c) = Cosigned By    Initials Name Provider Type    KG Harleen Das, PT Physical Therapist               Goals/Plan    No documentation.                Clinical Impression     Row Name 11/04/22 1109          Pain    Pretreatment Pain Rating 10/10  -KG     Posttreatment Pain Rating 10/10  -KG     Pain Location incisional  -KG     Pain Location - chest  -KG     Pain Intervention(s) Repositioned;Ambulation/increased activity  -KG     Row Name  11/04/22 1109          Plan of Care Review    Plan of Care Reviewed With patient  -KG     Progress no change  -KG     Outcome Evaluation Pt ambulated 140ft with Seth and B UE support on tele monitor. Pt required frequent cues for upright posture and increased stride length. Did not require any standing rest breaks. Pt declined additional ambulation despite max encouragement. Limited by c/o increased incisional pain. Continue to progress as appropriate.  -KG     Row Name 11/04/22 1109          Vital Signs    Pre Systolic BP Rehab 150  -KG     Pre Treatment Diastolic BP 62  -KG     Post Systolic BP Rehab 157  -KG     Post Treatment Diastolic BP 63  -KG     Pretreatment Heart Rate (beats/min) 64  -KG     Posttreatment Heart Rate (beats/min) 73  -KG     Pre SpO2 (%) 93  -KG     O2 Delivery Pre Treatment supplemental O2  -KG     Post SpO2 (%) 94  -KG     O2 Delivery Post Treatment supplemental O2  -KG     Pre Patient Position Sitting  -KG     Intra Patient Position Standing  -KG     Post Patient Position Sitting  -KG     Row Name 11/04/22 1109          Positioning and Restraints    Pre-Treatment Position in bed  -KG     Post Treatment Position chair  -KG     In Chair sitting;call light within reach;encouraged to call for assist;with nsg  -KG           User Key  (r) = Recorded By, (t) = Taken By, (c) = Cosigned By    Initials Name Provider Type    KG Harleen Das, PT Physical Therapist               Outcome Measures     Row Name 11/04/22 1111          How much help from another person do you currently need...    Turning from your back to your side while in flat bed without using bedrails? 3  -KG     Moving from lying on back to sitting on the side of a flat bed without bedrails? 2  -KG     Moving to and from a bed to a chair (including a wheelchair)? 3  -KG     Standing up from a chair using your arms (e.g., wheelchair, bedside chair)? 3  -KG     Climbing 3-5 steps with a railing? 2  -KG     To walk in hospital  room? 3  -KG     AM-PAC 6 Clicks Score (PT) 16  -KG     Highest level of mobility 5 --> Static standing  -KG     Row Name 11/04/22 1111          Functional Assessment    Outcome Measure Options AM-PAC 6 Clicks Basic Mobility (PT)  -KG           User Key  (r) = Recorded By, (t) = Taken By, (c) = Cosigned By    Initials Name Provider Type    KG Harleen Das PT Physical Therapist                             Physical Therapy Education     Title: PT OT SLP Therapies (In Progress)     Topic: Physical Therapy (In Progress)     Point: Mobility training (In Progress)     Learning Progress Summary           Patient Acceptance, E, NR by KG at 11/4/2022 0842    Acceptance, E, NR by KG at 11/3/2022 0934    Acceptance, E, NR by KG at 11/1/2022 0930    Acceptance, E, NR by KG at 10/31/2022 0907                   Point: Home exercise program (In Progress)     Learning Progress Summary           Patient Acceptance, E, NR by KG at 11/4/2022 0842    Acceptance, E, NR by KG at 11/3/2022 0934    Acceptance, E, NR by KG at 11/1/2022 0930                   Point: Body mechanics (In Progress)     Learning Progress Summary           Patient Acceptance, E, NR by KG at 11/4/2022 0842    Acceptance, E, NR by KG at 11/3/2022 0934    Acceptance, E, NR by KG at 11/1/2022 0930    Acceptance, E, NR by KG at 10/31/2022 0907                   Point: Precautions (In Progress)     Learning Progress Summary           Patient Acceptance, E, NR by KG at 11/4/2022 0842    Acceptance, E, NR by KG at 11/3/2022 0934    Acceptance, E, NR by KG at 11/1/2022 0930    Acceptance, E, NR by KG at 10/31/2022 0907                               User Key     Initials Effective Dates Name Provider Type Discipline    KG 05/22/20 -  Harleen Das PT Physical Therapist PT              PT Recommendation and Plan  Planned Therapy Interventions (PT): balance training, bed mobility training, gait training, strengthening, transfer training  Plan of Care  Reviewed With: patient  Progress: no change  Outcome Evaluation: Pt ambulated 140ft with Seth and B UE support on tele monitor. Pt required frequent cues for upright posture and increased stride length. Did not require any standing rest breaks. Pt declined additional ambulation despite max encouragement. Limited by c/o increased incisional pain. Continue to progress as appropriate.     Time Calculation:    PT Charges     Row Name 11/04/22 0842             Time Calculation    Start Time 0842  -KG      PT Received On 11/04/22  -KG      PT Goal Re-Cert Due Date 11/10/22  -KG         Time Calculation- PT    Total Timed Code Minutes- PT 24 minute(s)  -KG         Timed Charges    87850 - PT Therapeutic Activity Minutes 24  -KG         Total Minutes    Timed Charges Total Minutes 24  -KG       Total Minutes 24  -KG            User Key  (r) = Recorded By, (t) = Taken By, (c) = Cosigned By    Initials Name Provider Type    KG Harleen Das, PT Physical Therapist              Therapy Charges for Today     Code Description Service Date Service Provider Modifiers Qty    10605764974 HC PT THERAPEUTIC ACT EA 15 MIN 11/3/2022 Harelen Das, PT GP 2    89770860207 HC PT THER SUPP EA 15 MIN 11/3/2022 Harleen Das, PT GP 2    37069885520 HC PT THERAPEUTIC ACT EA 15 MIN 11/4/2022 Harleen Das, PT GP 2    36835463964 HC PT THER SUPP EA 15 MIN 11/4/2022 Harleen Das, PT GP 2          PT G-Codes  Outcome Measure Options: AM-PAC 6 Clicks Basic Mobility (PT)  AM-PAC 6 Clicks Score (PT): 16  AM-PAC 6 Clicks Score (OT): 14    Cece Das PT  11/4/2022

## 2022-11-04 NOTE — PROGRESS NOTES
CTS Progress Note       LOS: 8 days   Patient Care Team:  Osiel Peña MD as PCP - General (Family Medicine)    Chief Complaint: NSTEMI (non-ST elevated myocardial infarction) ([facility])    Vital Signs:  Temp:  [98.2 °F (36.8 °C)-98.8 °F (37.1 °C)] 98.8 °F (37.1 °C)  Heart Rate:  [64-80] 76  Resp:  [16-20] 16  BP: (109-168)/(62-85) 122/65    Physical Exam: More cooperative and ambulatory       Results:   Results from last 7 days   Lab Units 11/04/22  0134   WBC 10*3/mm3 10.08   HEMOGLOBIN g/dL 10.2*   HEMATOCRIT % 32.3*   PLATELETS 10*3/mm3 264     Results from last 7 days   Lab Units 11/04/22  0134   SODIUM mmol/L 139   POTASSIUM mmol/L 3.9   CHLORIDE mmol/L 103   CO2 mmol/L 30.0*   BUN mg/dL 14   CREATININE mg/dL 0.55*   GLUCOSE mg/dL 112*   CALCIUM mg/dL 8.3*           Imaging Results (Last 24 Hours)     Procedure Component Value Units Date/Time    SLP FEES - Fiberoptic Endo Eval Swallow [[id number]] Resulted: 11/03/22 1137     Updated: 11/03/22 1137    Narrative:      This procedure was auto-finalized with no dictation required.          Assessment      I21.4 CABG 10/28/22    I25.10 MV CAD s/p stents     E11.9 T2DM     F19.90 Illicit drug use    I10 Hypertension    E78.5 Dyslipidemia    K21.9 GERD    Z98.84 Hx of laparoscopic gastric banding    Z72 Tobacco use        Plan   Transfer to telemetry.  Delete oral narcotics from transfer orders  Likely not home until Monday and will need Plavix prescription at discharge  Consideration of 1 week of inpatient rehab would probably be best if patient would agree  to evaluate    Please note that portions of this note were completed with a voice recognition program. Efforts were made to edit the dictations, but occasionally words are mistranscribed.    Bayron Diaz MD  11/04/22  06:25 EDT

## 2022-11-04 NOTE — PLAN OF CARE
Goal Outcome Evaluation:      Plan of care reviewed with patient    Neuro: alert and oriented, VU, ambulated x 2 (180, 180), refused to sit up for more than 30 min x 2    Resp: 2 LPM, sats > 90%, LS clear, diminished at bases, requires encouragement to do IS, otherwise will not do it    CV: SR 60-70's, -50's/    GI: poor appetite, no BM    : voided x 1 and also had large incont episode in bed this AM    Pain: Hydrocodone x 2    Misc: orders received for transfer

## 2022-11-04 NOTE — PLAN OF CARE
Goal Outcome Evaluation:  Plan of Care Reviewed With: patient        Progress: no change  Outcome Evaluation: Pt ambulated 140ft with Seth and B UE support on tele monitor. Pt required frequent cues for upright posture and increased stride length. Did not require any standing rest breaks. Pt declined additional ambulation despite max encouragement. Limited by c/o increased incisional pain. Continue to progress as appropriate.

## 2022-11-05 ENCOUNTER — READMISSION MANAGEMENT (OUTPATIENT)
Dept: CALL CENTER | Facility: HOSPITAL | Age: 62
End: 2022-11-05

## 2022-11-05 VITALS
SYSTOLIC BLOOD PRESSURE: 121 MMHG | DIASTOLIC BLOOD PRESSURE: 61 MMHG | RESPIRATION RATE: 20 BRPM | HEIGHT: 63 IN | WEIGHT: 181.3 LBS | BODY MASS INDEX: 32.12 KG/M2 | HEART RATE: 65 BPM | TEMPERATURE: 97.9 F | OXYGEN SATURATION: 92 %

## 2022-11-05 LAB
ANION GAP SERPL CALCULATED.3IONS-SCNC: 9 MMOL/L (ref 5–15)
BUN SERPL-MCNC: 13 MG/DL (ref 8–23)
BUN/CREAT SERPL: 20 (ref 7–25)
CALCIUM SPEC-SCNC: 8.8 MG/DL (ref 8.6–10.5)
CHLORIDE SERPL-SCNC: 102 MMOL/L (ref 98–107)
CO2 SERPL-SCNC: 26 MMOL/L (ref 22–29)
CREAT SERPL-MCNC: 0.65 MG/DL (ref 0.57–1)
EGFRCR SERPLBLD CKD-EPI 2021: 100.3 ML/MIN/1.73
GLUCOSE BLDC GLUCOMTR-MCNC: 142 MG/DL (ref 70–130)
GLUCOSE BLDC GLUCOMTR-MCNC: 170 MG/DL (ref 70–130)
GLUCOSE SERPL-MCNC: 140 MG/DL (ref 65–99)
HCT VFR BLD AUTO: 33.8 % (ref 34–46.6)
HGB BLD-MCNC: 10.7 G/DL (ref 12–15.9)
MAGNESIUM SERPL-MCNC: 1.8 MG/DL (ref 1.6–2.4)
PA ADP PRP-ACNC: 112 PRU
PHOSPHATE SERPL-MCNC: 3.9 MG/DL (ref 2.5–4.5)
POTASSIUM SERPL-SCNC: 3.8 MMOL/L (ref 3.5–5.2)
SODIUM SERPL-SCNC: 137 MMOL/L (ref 136–145)

## 2022-11-05 PROCEDURE — 85014 HEMATOCRIT: CPT | Performed by: STUDENT IN AN ORGANIZED HEALTH CARE EDUCATION/TRAINING PROGRAM

## 2022-11-05 PROCEDURE — 83735 ASSAY OF MAGNESIUM: CPT | Performed by: INTERNAL MEDICINE

## 2022-11-05 PROCEDURE — 25010000002 HEPARIN (PORCINE) PER 1000 UNITS: Performed by: STUDENT IN AN ORGANIZED HEALTH CARE EDUCATION/TRAINING PROGRAM

## 2022-11-05 PROCEDURE — 93005 ELECTROCARDIOGRAM TRACING: CPT | Performed by: PHYSICIAN ASSISTANT

## 2022-11-05 PROCEDURE — 99232 SBSQ HOSP IP/OBS MODERATE 35: CPT | Performed by: INTERNAL MEDICINE

## 2022-11-05 PROCEDURE — 85018 HEMOGLOBIN: CPT | Performed by: STUDENT IN AN ORGANIZED HEALTH CARE EDUCATION/TRAINING PROGRAM

## 2022-11-05 PROCEDURE — 80048 BASIC METABOLIC PNL TOTAL CA: CPT | Performed by: STUDENT IN AN ORGANIZED HEALTH CARE EDUCATION/TRAINING PROGRAM

## 2022-11-05 PROCEDURE — 82962 GLUCOSE BLOOD TEST: CPT

## 2022-11-05 PROCEDURE — 84100 ASSAY OF PHOSPHORUS: CPT | Performed by: INTERNAL MEDICINE

## 2022-11-05 PROCEDURE — 99024 POSTOP FOLLOW-UP VISIT: CPT | Performed by: THORACIC SURGERY (CARDIOTHORACIC VASCULAR SURGERY)

## 2022-11-05 PROCEDURE — 63710000001 INSULIN LISPRO (HUMAN) PER 5 UNITS: Performed by: STUDENT IN AN ORGANIZED HEALTH CARE EDUCATION/TRAINING PROGRAM

## 2022-11-05 PROCEDURE — 63710000001 INSULIN DETEMIR PER 5 UNITS: Performed by: STUDENT IN AN ORGANIZED HEALTH CARE EDUCATION/TRAINING PROGRAM

## 2022-11-05 PROCEDURE — 93010 ELECTROCARDIOGRAM REPORT: CPT | Performed by: INTERNAL MEDICINE

## 2022-11-05 PROCEDURE — 85576 BLOOD PLATELET AGGREGATION: CPT | Performed by: STUDENT IN AN ORGANIZED HEALTH CARE EDUCATION/TRAINING PROGRAM

## 2022-11-05 RX ORDER — ASPIRIN 81 MG/1
324 TABLET, CHEWABLE ORAL DAILY
Qty: 120 TABLET | Refills: 1 | Status: SHIPPED | OUTPATIENT
Start: 2022-11-06

## 2022-11-05 RX ORDER — ESCITALOPRAM OXALATE 10 MG/1
10 TABLET ORAL DAILY
Qty: 30 TABLET | Refills: 1 | Status: SHIPPED | OUTPATIENT
Start: 2022-11-05

## 2022-11-05 RX ORDER — BLOOD SUGAR DIAGNOSTIC
1 STRIP MISCELLANEOUS
Qty: 100 EACH | Refills: 0 | Status: SHIPPED | OUTPATIENT
Start: 2022-11-05

## 2022-11-05 RX ORDER — CLOPIDOGREL BISULFATE 75 MG/1
75 TABLET ORAL DAILY
Qty: 2 TABLET | Refills: 0 | Status: SHIPPED | OUTPATIENT
Start: 2022-11-06 | End: 2022-11-08

## 2022-11-05 RX ORDER — METOPROLOL SUCCINATE 25 MG/1
25 TABLET, EXTENDED RELEASE ORAL DAILY
Qty: 30 TABLET | Refills: 1 | Status: SHIPPED | OUTPATIENT
Start: 2022-11-06 | End: 2023-02-21 | Stop reason: DRUGHIGH

## 2022-11-05 RX ORDER — ACETAMINOPHEN 325 MG/1
650 TABLET ORAL EVERY 4 HOURS PRN
Start: 2022-11-05

## 2022-11-05 RX ORDER — BLOOD-GLUCOSE METER
1 KIT MISCELLANEOUS
Qty: 1 EACH | Refills: 0 | Status: SHIPPED | OUTPATIENT
Start: 2022-11-05

## 2022-11-05 RX ORDER — LISINOPRIL 10 MG/1
10 TABLET ORAL
Qty: 30 TABLET | Refills: 1 | Status: SHIPPED | OUTPATIENT
Start: 2022-11-06

## 2022-11-05 RX ORDER — INSULIN DETEMIR 100 [IU]/ML
5 INJECTION, SOLUTION SUBCUTANEOUS NIGHTLY
Qty: 2 ML | Refills: 1 | Status: SHIPPED | OUTPATIENT
Start: 2022-11-06

## 2022-11-05 RX ORDER — IPRATROPIUM BROMIDE AND ALBUTEROL SULFATE 2.5; .5 MG/3ML; MG/3ML
3 SOLUTION RESPIRATORY (INHALATION) 4 TIMES DAILY PRN
Status: DISCONTINUED | OUTPATIENT
Start: 2022-11-05 | End: 2022-11-05 | Stop reason: SDUPTHER

## 2022-11-05 RX ORDER — LANCETS 28 GAUGE
1 EACH MISCELLANEOUS
Qty: 100 EACH | Refills: 12 | Status: SHIPPED | OUTPATIENT
Start: 2022-11-05

## 2022-11-05 RX ORDER — PEN NEEDLE, DIABETIC 30 GX3/16"
1 NEEDLE, DISPOSABLE MISCELLANEOUS NIGHTLY
Qty: 100 EACH | Refills: 1 | Status: SHIPPED | OUTPATIENT
Start: 2022-11-05

## 2022-11-05 RX ORDER — ATORVASTATIN CALCIUM 40 MG/1
40 TABLET, FILM COATED ORAL NIGHTLY
Qty: 90 TABLET | Refills: 1 | Status: SHIPPED | OUTPATIENT
Start: 2022-11-05

## 2022-11-05 RX ADMIN — Medication 10 ML: at 09:08

## 2022-11-05 RX ADMIN — ASPIRIN 81 MG CHEWABLE TABLET 324 MG: 81 TABLET CHEWABLE at 08:39

## 2022-11-05 RX ADMIN — PANTOPRAZOLE SODIUM 40 MG: 40 INJECTION, POWDER, FOR SOLUTION INTRAVENOUS at 04:56

## 2022-11-05 RX ADMIN — HEPARIN SODIUM 5000 UNITS: 5000 INJECTION INTRAVENOUS; SUBCUTANEOUS at 04:56

## 2022-11-05 RX ADMIN — INSULIN DETEMIR 5 UNITS: 100 INJECTION, SOLUTION SUBCUTANEOUS at 09:03

## 2022-11-05 RX ADMIN — ESCITALOPRAM OXALATE 10 MG: 10 TABLET ORAL at 08:39

## 2022-11-05 RX ADMIN — INSULIN LISPRO 2 UNITS: 100 INJECTION, SOLUTION INTRAVENOUS; SUBCUTANEOUS at 12:53

## 2022-11-05 RX ADMIN — CLOPIDOGREL BISULFATE 75 MG: 75 TABLET ORAL at 08:39

## 2022-11-05 RX ADMIN — METOPROLOL TARTRATE 12.5 MG: 25 TABLET, FILM COATED ORAL at 08:40

## 2022-11-05 RX ADMIN — SENNOSIDES AND DOCUSATE SODIUM 2 TABLET: 50; 8.6 TABLET ORAL at 08:37

## 2022-11-05 RX ADMIN — LISINOPRIL 10 MG: 10 TABLET ORAL at 08:39

## 2022-11-05 NOTE — PROGRESS NOTES
8 Days Post-Op       LOS: 9 days   Patient Care Team:  Osiel Peña MD as PCP - General (Family Medicine)    Chief complaint:    Subjective   Denies chest pain, denies shortness of breath    Objective    Vital Signs  Temp:  [97.9 °F (36.6 °C)-98.3 °F (36.8 °C)] 98.2 °F (36.8 °C)  Heart Rate:  [60-82] 67  Resp:  [16-18] 16  BP: (101-166)/(58-95) 134/83    Physical Exam:   General Appearance: alert, appears stated age and cooperative   Lungs: clear bilaterally   Heart: Regular rate and rhythm   Skin:  Incision c/d/i     Results   Results from last 7 days   Lab Units 11/04/22  0134   WBC 10*3/mm3 10.08   HEMOGLOBIN g/dL 10.2*   HEMATOCRIT % 32.3*   PLATELETS 10*3/mm3 264     Results from last 7 days   Lab Units 11/04/22  0134   SODIUM mmol/L 139   POTASSIUM mmol/L 3.9   CHLORIDE mmol/L 103   CO2 mmol/L 30.0*   BUN mg/dL 14   CREATININE mg/dL 0.55*   GLUCOSE mg/dL 112*   CALCIUM mg/dL 8.3*               Assessment      I21.4 CABG 10/28/22    I25.10 MV CAD s/p stents     E11.9 T2DM     F19.90 Illicit drug use    I10 Hypertension    E78.5 Dyslipidemia    K21.9 GERD    Z98.84 Hx of laparoscopic gastric banding    Z72 Tobacco use        Plan   Lexapro for depression  Chemical dependency consult  Fees test 48 hours ago and diet was changed from honey thick to nectar thick liquids  Avoid narcotic intake  Plavix at discharge  Home soon she wants to go home instead of rehab  Possible rehab if patient agrees        Jame Causey PA-C  11/05/22  06:59 EDT

## 2022-11-05 NOTE — CASE MANAGEMENT/SOCIAL WORK
"Continued Stay Note  [facility]     Patient Name: Shantel Ríos  MRN: [id number]  Today's Date: 11/5/2022    Admit Date: 10/27/2022    Plan: Home   Discharge Plan     Row Name 11/05/22 1555       Plan    Plan Home    Patient/Family in Agreement with Plan no    Plan Comments Met & spoke with Ms Ríos after multiple conversations with her sister, Mary Beth, regarding DC planning. Between myself, her sister, nursing staff, and provider, lots of encouragement given to Ms Ríos to stay as a patient and consider going to rehab. Informed her that [facility] offering a bed. She was yelling and cursing and exclaiming she didn't want to hear anything else about rehab. She just wanted her \"DC papers and leave\". She did qualify for home O2, but declined that, too. She stated she wasn't sure how she was getting home, but just needed to leave. Was very agitated, avoiding eye contact, and nervous-acting.    Final Discharge Disposition Code 01 - home or self-care               Discharge Codes    No documentation.               Expected Discharge Date and Time     Expected Discharge Date Expected Discharge Time    Nov 5, 2022             Maggi Kaba RN    "

## 2022-11-05 NOTE — PROGRESS NOTES
[facility] Medicine Services  PROGRESS NOTE    Patient Name: Shantel Ríos  : 1960  MRN: [id number]    Date of Admission: 10/27/2022  Primary Care Physician: Osiel Peña MD    Subjective   Subjective     CC:  DM management    HPI:  She is feeling well today.  Denies chest pain.      ROS:  Gen- No fevers, chills  CV- No chest pain, palpitations  Resp- No dyspnea    Objective   Objective     Vital Signs:   Temp:  [97.9 °F (36.6 °C)-98.7 °F (37.1 °C)] 98.7 °F (37.1 °C)  Heart Rate:  [60-82] 78  Resp:  [16-18] 18  BP: (101-166)/(58-95) 117/79  Flow (L/min):  [2] 2     Physical Exam:  Constitutional: No acute distress, awake, alert, laying in bed  HENT: NCAT, mucous membranes moist  Respiratory: Respiratory effort normal   Cardiovascular: RRR  Psychiatric: Appropriate affect, cooperative  Neurologic: Speech clear and fluent  Skin: No rashes on exposed surfaces    Results Reviewed:  LAB RESULTS:      Lab 22  0654 22  0134 22  0239 11/01/22  0342 10/31/22  0308 10/30/22  0216   WBC  --  10.08 7.64 10.40 10.04 12.10*   HEMOGLOBIN 10.7* 10.2* 9.5* 10.0* 9.6* 10.0*   HEMATOCRIT 33.8* 32.3* 30.4* 31.5* 30.4* 31.5*   PLATELETS  --  264 203 180 132* 152   MCV  --  95.3 96.8 96.6 96.8 96.0         Lab 22  0654 22  0134 22  0334 11/01/22  0342 10/31/22  0308 10/30/22  0613   SODIUM 137 139 141 141 137 139  --    POTASSIUM 3.8 3.9 4.3 3.4* 3.8 4.1  --    CHLORIDE 102 103 102 100 100 104  --    CO2 26.0 30.0* 33.0* 32.0* 31.0* 28.0  --    ANION GAP 9.0 6.0 6.0 9.0 6.0 7.0  --    BUN 13 14 15 18 19 19  --    CREATININE 0.65 0.55* 0.56* 0.69 0.67 0.75  --    EGFR 100.3 104.4 104.0 98.9 99.6 90.7  --    GLUCOSE 140* 112* 150* 145* 144* 135*  --    CALCIUM 8.8 8.3* 8.3* 8.7 8.2* 7.9*  --    IONIZED CALCIUM  --   --   --   --   --   --  1.25   MAGNESIUM 1.8 1.7 1.8 2.1 1.8 2.0  --    PHOSPHORUS 3.9 3.4 3.3 3.7  --  3.2  --                           Lab 10/29/22  1418   PH, ARTERIAL 7.400   PCO2, ARTERIAL 45.3*   PO2 ART 62.7*   FIO2 40   HCO3 ART 28.0*   BASE EXCESS ART 2.7*   CARBOXYHEMOGLOBIN 0.9     Brief Urine Lab Results     None          Microbiology Results Abnormal     None          SLP FEES - Fiberoptic Endo Eval Swallow    Result Date: 11/3/2022  This procedure was auto-finalized with no dictation required.      Results for orders placed during the hospital encounter of 10/27/22    Adult Transthoracic Echocardiogram Complete    Interpretation Summary  •  Left ventricular systolic function is mildly decreased. Left ventricular ejection fraction appears to be 46 - 50%.  •  The left ventricular cavity is moderate to severely dilated.  •  The following left ventricular wall segments are hypokinetic: mid inferior, basal inferoseptal, mid inferoseptal and basal inferoseptal. The following left ventricular wall segments are dyskinetic: mid anterolateral and mid inferolateral. The following left ventricular wall segments are akinetic: basal anterolateral, basal inferolateral and basal inferior.  •  Left ventricular diastolic function is consistent with (grade II w/high LAP) pseudonormalization.  •  Left atrial volume is moderately increased.  •  Moderate mitral valve regurgitation is present.      I have reviewed the medications:  Scheduled Meds:aspirin, 324 mg, Oral, Daily  atorvastatin, 40 mg, Oral, Nightly  clopidogrel, 75 mg, Oral, Daily  escitalopram, 10 mg, Oral, Daily  heparin (porcine), 5,000 Units, Subcutaneous, Q8H  insulin detemir, 5 Units, Subcutaneous, Daily  insulin lispro, 0-7 Units, Subcutaneous, 4x Daily With Meals & Nightly  Insulin Lispro, 2 Units, Subcutaneous, TID With Meals  lisinopril, 10 mg, Oral, Q24H  metoprolol tartrate, 12.5 mg, Oral, Q12H  pantoprazole, 40 mg, Intravenous, Q AM  pharmacy consult - MTM, , Does not apply, Daily  senna-docusate sodium, 2 tablet, Oral, BID  sodium chloride, 10 mL, Intravenous, Q12H  sodium  chloride, 10 mL, Intravenous, Q12H  sodium chloride, 10 mL, Intravenous, Q8H      Continuous Infusions:   PRN Meds:.•  acetaminophen  •  bisacodyl  •  bisacodyl  •  dextrose  •  dextrose  •  docusate sodium  •  glucagon (human recombinant)  •  guaifenesin  •  hydrALAZINE  •  influenza vaccine  •  ipratropium-albuterol  •  magnesium hydroxide  •  magnesium sulfate **OR** magnesium sulfate **OR** magnesium sulfate  •  ondansetron  •  polyethylene glycol  •  potassium chloride **OR** potassium chloride  •  potassium chloride **OR** potassium chloride  •  senna-docusate sodium  •  sodium chloride    Assessment & Plan   Assessment & Plan     Active Hospital Problems    Diagnosis  POA   • **I21.4 CABG 10/28/22 [I21.4]  Yes   • E11.9 T2DM  [E11.9]  Yes   • F19.90 Illicit drug use [F19.90]  Yes   • I10 Hypertension [I10]  Yes   • E78.5 Dyslipidemia [E78.5]  Yes   • K21.9 GERD [K21.9]  Yes   • Z98.84 Hx of laparoscopic gastric banding [Z98.84]  Not Applicable   • Z72 Tobacco use [Z72.0]  Yes   • I25.10 MV CAD s/p stents  [I25.10]  Yes      Resolved Hospital Problems   No resolved problems to display.        Brief Hospital Course to date:  Shantel Ríos is a 62 y.o. female admitted for CABG after left heart catheterization at outside facility in setting of NSTEMI revealed multivessel CAD.  She has been transferred out of the ICU and hospitalists consulted for management of medical problems.    Multivessel CAD s/p CABG  -Management per CT surgery    DM2, uncontrolled  -A1c 9.1%  -Continue basal/bolus insulin  -She is on metformin at home.  Will continue that at discharge, add jardiance and levemir 5 units nightly.  Med rec done in anticipation of patient discharge    Hypoxia  -Wean oxygen as tolerated, pulmonary toilet    Tobacco abuse  -Cessation counseling    Substance abuse  -UDS positive for cocaine at outside hospital  -Chemical dependency consult  -Discussed with patient that she will likely die if she uses illicit  substances    Dysphagia  -On modified diet  -SLP following    DVT prophylaxis:  Medical and mechanical DVT prophylaxis orders are present.     AM-PAC 6 Clicks Score (PT): 16 (11/04/22 1111)    CODE STATUS:   Code Status and Medical Interventions:   Ordered at: 10/28/22 0520     Code Status (Patient has no pulse and is not breathing):    CPR (Attempt to Resuscitate)     Medical Interventions (Patient has pulse or is breathing):    Full Support       Jeane Candelaria MD  11/05/22

## 2022-11-06 NOTE — OUTREACH NOTE
Prep Survey    Flowsheet Row Responses   [facility] facility patient discharged from? [facility]   Is LACE score < 7 ? No   Emergency Room discharge w/ pulse ox? No   Eligibility Not Eligible   What are the reasons patient is not eligible? [facility]  [[facility] - SIGNATURE]   Does the patient have one of the following disease processes/diagnoses(primary or secondary)? Cardiothoracic surgery   Prep survey completed? Yes          LATISHA SANCHEZ - Registered Nurse

## 2022-11-06 NOTE — DISCHARGE SUMMARY
CTS Discharge Summary    Patient Care Team:  Osiel Peña MD as PCP - General (Family Medicine)      Date of Admission: 10/27/2022 10:27 PM  Date of Discharge: 11/5/2022    Discharge Diagnosis  Past Medical History:   Diagnosis Date   • Anxiety    • CAD (coronary artery disease)    • Cancer of stomach (HCC)     Hx of stomach and uterine ca   • Depression    • Diabetes mellitus (HCC)     Type 2   • GERD (gastroesophageal reflux disease)    • History of radical hysterectomy    • Hyperlipidemia    • Hypertension    • MI (mitral incompetence)     Hx   • Pancreatitis     Chronic   • PTSD (post-traumatic stress disorder)    • Tumors     Stomach tumors         I21.4 CABG 10/28/22    I25.10 MV CAD s/p stents     E11.9 T2DM     F19.90 Illicit drug use    I10 Hypertension    E78.5 Dyslipidemia    K21.9 GERD    Z98.84 Hx of laparoscopic gastric banding    Z72 Tobacco use      History of Present IllnessPatient is a 62 y.o. female with hypertension, hyperlipidemia, uncontrolled diabetes and known coronary artery disease.  Patient has had diagnosis of early onset coronary disease and has multiple stents placed in the past.  She also has COPD and a long history of smoking tobacco use.  Patient has been complaining of chest pain for the past 2 months.  On 10/26/2022 the patient's pain had progressed and was radiating down her arm and up her neck.  At that point she presented outside hospital and a cardiac work-up ensued.  She had a noted elevated D-dimer but a CTA of the chest was reportedly negative for PE.  Also reportedly the patient had a drug screen which tested positive for cocaine and amphetamines.  She was referred here for further evaluation and consideration for coronary bypass grafting.  Upon admission and questioning the patient was found to be groggy.  She is alert and oriented.  States she does use cocaine and meth when she needs to pick me up.  States the last time she used was the day prior to admission at  "[facility]  Patient is a 62 y.o. female was admitted on 10/27/2022 prepared for surgery for 10/28/2022 she underwent a coronary artery bypass grafting x3 with endoscopic vein harvesting right greater saphenous vein postop course was consultation with cardiology  Mediate postop course she was in ICU and being covered by the hospitalist.  She had to have a have a modified barium swallow and fees study in regards to her dysphagia  He did.  Medications for depression as well as evaluation for drug dependency  Postop course she was not cooperating and she was noncompliant and refused to ambulate and follow the nursing direction.  Was transferred up on telemetry on 11/4/2022 her oral narcotics were discontinued she remained on statins aspirin beta-blockers and Plavix  Was felt that she needed to go to rehab postop at discharge however the patient refused  She was discharged on 11/5/2022 however her sister refused to come get her and we did not have a bed arranged for rehab however she was refusing to go to rehab the following is the note from the  and that she was going to leave no matter what and even go AMA if need be  Met & spoke with Ms Ríos after multiple conversations with her sister, Mary Beth, regarding DC planning. Between myself, her sister, nursing staff, and provider, lots of encouragement given to Ms Ríos to stay as a patient and consider going to rehab. Informed her that [facility] was offering a bed. She was yelling and cursing and exclaiming she didn't want to hear anything else about rehab. She just wanted her \"DC papers and leave\". She did qualify for home O2, but declined that, too. She stated she wasn't sure how she was getting home, but just needed to leave. Was very agitated, avoiding eye contact, and nervous-acting.       Final Discharge Disposition Code 01 - home or self-care                 Procedures Performed  Procedure(s):  MEDIAN " STERNOTOMY, CORONARY ARTERY BYPASS GRAFTING X 3, UTILIZING THE LEFT INTERNAL MAMMARY ARTERY, ENDOSCOPIC VEIN HARVEST OF THE RIGHT GREATER SAPHENOUS VEIN       Consults:   Consults     Date and Time Order Name Status Description    10/28/2022  3:51 PM Inpatient Consult to Cardiology Completed             Discharge Medications     Discharge Medications      New Medications      Instructions Start Date   acetaminophen 325 MG tablet  Commonly known as: TYLENOL   650 mg, Oral, Every 4 Hours PRN      Alcohol Pads 70 % pads   1 application, Does not apply, 4 Times Daily Before Meals & Nightly      aspirin 81 MG chewable tablet   324 mg, Oral, Daily      atorvastatin 40 MG tablet  Commonly known as: LIPITOR   40 mg, Oral, Nightly      clopidogrel 75 MG tablet  Commonly known as: PLAVIX   75 mg, Oral, Daily      empagliflozin 10 MG tablet tablet  Commonly known as: Jardiance   10 mg, Oral, Daily      freestyle lancets   1 each, Other, 4 Times Daily Before Meals & Nightly, Use as instructed      FreeStyle Lite device   1 Device, Does not apply, 4 Times Daily Before Meals & Nightly      glucose blood test strip  Commonly known as: FREESTYLE LITE   1 each, Other, 4 Times Daily Before Meals & Nightly, Use as instructed      Levemir FlexTouch 100 UNIT/ML injection  Generic drug: insulin detemir   5 Units, Subcutaneous, Nightly      lisinopril 10 MG tablet  Commonly known as: PRINIVIL,ZESTRIL   10 mg, Oral, Every 24 Hours Scheduled      metFORMIN 500 MG tablet  Commonly known as: Glucophage   500 mg, Oral, 2 Times Daily With Meals      metoprolol succinate XL 25 MG 24 hr tablet  Commonly known as: Toprol XL   25 mg, Oral, Daily      Pen Needles 32G X 4 MM misc   1 each, Subcutaneous, Nightly         Continue These Medications      Instructions Start Date   escitalopram 10 MG tablet  Commonly known as: LEXAPRO   10 mg, Oral, Daily           Cardiac tabetic diet  Discharge Diet:     Activity at Discharge:   Do not drive while taking  narcotics    Follow-up Appointments  No future appointments.       Jame Causey PA-C  11/06/22  10:20 EST

## 2022-11-07 ENCOUNTER — TELEPHONE (OUTPATIENT)
Dept: CARDIAC SURGERY | Facility: CLINIC | Age: 62
End: 2022-11-07

## 2022-11-07 LAB
QT INTERVAL: 372 MS
QTC INTERVAL: 426 MS

## 2022-11-07 NOTE — TELEPHONE ENCOUNTER
Caller: Shantel Ríos     Relationship: SELF    Best call back number: [phone]    What is your medical concern? PATIENT STATES THAT THEY ARE IN A LOT OF PAIN AFTER PROCEDURE. PATIENT STATES THAT THEY CAN NOT SLEEP OR FUNCTION DUE TO THE PAIN.     How long has this issue been going on?  2 DAYS    Is your provider already aware of this issue? NO    PATIENT WOULD LIKE A CALL BACL TO DISCUSS PAIN MEDICATION.

## 2022-11-07 NOTE — TELEPHONE ENCOUNTER
I called Ms Ríos to let her know that per Gianna NELSON that our office will not call her in any pain medication. She was very upset. I told her that I would let Dr Diaz know that she was upset about not getting any pain medication after her surgery. She asked me to also let Dr Diaz know that she will not be back to see him in the future. I told her I would let Dr Diaz know.

## 2022-11-09 ENCOUNTER — TELEPHONE (OUTPATIENT)
Dept: CARDIAC SURGERY | Facility: CLINIC | Age: 62
End: 2022-11-09

## 2022-11-09 NOTE — TELEPHONE ENCOUNTER
Sister called to report that patient is smoking again and also has been driving and not yet released.  She is also coughing.  I advised her sister that she needs to see her PCP or go to the local ER to be evaluated.  Ms. Ríos is being very defiant and non compliant, but I reiterated that she does not need to be driving or smoking and needs to be evaluated asap.

## 2022-11-14 NOTE — PROGRESS NOTES
Enter Query Response Below      Query Response:     Diabetes mellitus with hyperglycemia  Electronically signed by Jeane Candelaria MD, 22, 8:43 AM EST.          If applicable, please update the problem list.      Patient: Shantel Ríos        : 1960  Account: [id number]           Admit Date: 10/27/2022        How to Respond to this query:       a. Click New Note     b. Answer query within the yellow box.                c. Update the Problem List, if applicable.      If you have any questions about this query contact me at: [phone]    Dr. Candelaria:     62 year old female transferred to [facility] after experiencing a non-ST elevation myocardial infarction.   The hospitalist progress notes include the diagnosis of diabetes mellitus uncontrolled.   The lab results from 10/28/22 include a Glucose of 382.   Prior to her hospital stay she was on metformin, she was treated during her hospital stay with humalog, regular insulin drip, and her discharging medications include metformin, jardiance, and levemir.      Can the diagnosis of uncontrolled diabetes mellitus be further clarified as:     Diabetes mellitus with hyperglycemia  Other______________________________________  Unable to determine        By submitting this query, we are merely seeking further clarification of documentation to accurately reflect all conditions that you are monitoring, evaluating, treating or that extend the hospitalization or utilize additional resources of care. Please utilize your independent clinical judgment when addressing the question(s) above.     This query and your response, once completed, will be entered into the legal medical record.    Sincerely,  Frida Herr RN,BSN    [email or web address]  Clinical Documentation Integrity Program

## 2022-11-28 ENCOUNTER — OFFICE VISIT (OUTPATIENT)
Dept: CARDIAC SURGERY | Facility: CLINIC | Age: 62
End: 2022-11-28

## 2022-11-28 VITALS
TEMPERATURE: 96.9 F | SYSTOLIC BLOOD PRESSURE: 128 MMHG | WEIGHT: 169.6 LBS | HEART RATE: 55 BPM | OXYGEN SATURATION: 100 % | DIASTOLIC BLOOD PRESSURE: 70 MMHG | HEIGHT: 63 IN | BODY MASS INDEX: 30.05 KG/M2

## 2022-11-28 DIAGNOSIS — Z95.1 S/P CABG X 3: ICD-10-CM

## 2022-11-28 PROCEDURE — 71046 X-RAY EXAM CHEST 2 VIEWS: CPT | Performed by: REGISTERED NURSE

## 2022-11-28 PROCEDURE — 99024 POSTOP FOLLOW-UP VISIT: CPT | Performed by: REGISTERED NURSE

## 2022-12-19 ENCOUNTER — TELEPHONE (OUTPATIENT)
Dept: CARDIOLOGY | Facility: CLINIC | Age: 62
End: 2022-12-19

## 2022-12-19 NOTE — TELEPHONE ENCOUNTER
Patient called very upset as she was having chest pain and recently had CABG on 10/28/22. Patient was made aware that she needed to go to the ER as we could not treat chest pain in the office. Patient states that she would call sister to take her as she was alone. She states that the pain was very bad. I asked patient if she could call 911 or if she wanted me to call 911. She states that she did not want that as she wasn't currently dressed to go to the hospital. I made her aware that calling 911 would be our best option and that I could call for her while she got dressed. Patient was asked what her location was and she replied that she was at the [facility] in [facility]. Patient said that it was ok for me to call 911.     I called 911 and made them aware of patient's name and location, but did not have apartment number. I called patient back but was unable to reach. Attempted to call back back again and did reach and was able to get [id number], repeated back to patient who confirmed it was [address]. Information was given to 911 that patient had CABG on 10/28/22 and that she was having chest pain and was very upset.

## 2023-02-21 ENCOUNTER — OFFICE VISIT (OUTPATIENT)
Dept: CARDIOLOGY | Facility: CLINIC | Age: 63
End: 2023-02-21
Payer: MEDICARE

## 2023-02-21 VITALS
DIASTOLIC BLOOD PRESSURE: 84 MMHG | HEART RATE: 96 BPM | WEIGHT: 164.4 LBS | HEIGHT: 63 IN | SYSTOLIC BLOOD PRESSURE: 130 MMHG | BODY MASS INDEX: 29.13 KG/M2

## 2023-02-21 DIAGNOSIS — E78.5 DYSLIPIDEMIA: Chronic | ICD-10-CM

## 2023-02-21 DIAGNOSIS — E11.8 DM (DIABETES MELLITUS), TYPE 2 WITH COMPLICATIONS: ICD-10-CM

## 2023-02-21 DIAGNOSIS — Z72.0 TOBACCO ABUSE: ICD-10-CM

## 2023-02-21 DIAGNOSIS — F19.90 ILLICIT DRUG USE: Chronic | ICD-10-CM

## 2023-02-21 DIAGNOSIS — Z95.1 HX OF CABG: ICD-10-CM

## 2023-02-21 DIAGNOSIS — I10 PRIMARY HYPERTENSION: Chronic | ICD-10-CM

## 2023-02-21 DIAGNOSIS — E66.3 OVERWEIGHT: ICD-10-CM

## 2023-02-21 DIAGNOSIS — I51.9 LV DYSFUNCTION: ICD-10-CM

## 2023-02-21 DIAGNOSIS — R06.02 SHORTNESS OF BREATH: Primary | ICD-10-CM

## 2023-02-21 DIAGNOSIS — R07.89 CHEST DISCOMFORT: ICD-10-CM

## 2023-02-21 DIAGNOSIS — I25.9 IHD (ISCHEMIC HEART DISEASE): ICD-10-CM

## 2023-02-21 DIAGNOSIS — Z72.0 TOBACCO USE: Chronic | ICD-10-CM

## 2023-02-21 DIAGNOSIS — J44.9 COPD MIXED TYPE: ICD-10-CM

## 2023-02-21 PROCEDURE — 93000 ELECTROCARDIOGRAM COMPLETE: CPT | Performed by: NURSE PRACTITIONER

## 2023-02-21 PROCEDURE — 99204 OFFICE O/P NEW MOD 45 MIN: CPT | Performed by: NURSE PRACTITIONER

## 2023-02-21 RX ORDER — NITROGLYCERIN 0.4 MG/1
TABLET SUBLINGUAL
Qty: 25 TABLET | Refills: 1 | Status: SHIPPED | OUTPATIENT
Start: 2023-02-21

## 2023-02-21 RX ORDER — METOPROLOL SUCCINATE 50 MG/1
50 TABLET, EXTENDED RELEASE ORAL DAILY
Qty: 30 TABLET | Refills: 11 | Status: SHIPPED | OUTPATIENT
Start: 2023-02-21
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
## 2023-03-08 NOTE — TELEPHONE ENCOUNTER
Pt called stating that she is having pain due to her not healing correctly from her surgery.  Pt was advised to reach out to her surgeon's office.  She voiced understanding.

## 2025-03-21 RX ORDER — METOPROLOL SUCCINATE 25 MG/1
25 TABLET, EXTENDED RELEASE ORAL DAILY
Qty: 90 TABLET | OUTPATIENT
Start: 2025-03-21

## 2025-03-21 RX ORDER — CLOPIDOGREL BISULFATE 75 MG/1
75 TABLET ORAL DAILY
Qty: 90 TABLET | OUTPATIENT
Start: 2025-03-21

## (undated) DEVICE — GLV SURG BIOGEL LTX PF 8

## (undated) DEVICE — SUCTION CANISTER 2500CC: Brand: DEROYAL

## (undated) DEVICE — AVANTI + 4F STD W/GW: Brand: AVANTI

## (undated) DEVICE — CANN AORT ROOT DLP VNT 14G 7F

## (undated) DEVICE — Device: Brand: PERFECTCUT ROTATING AORTIC PUNCH

## (undated) DEVICE — FLTR RESERV PERFUS INTERSEPT 02 STRL

## (undated) DEVICE — PENCL ROCKRSWCH MEGADYNE W/HOLSTR 10FT SS

## (undated) DEVICE — 12 FOOT DISPOSABLE EXTENSION CABLE WITH SAFE CONNECT / SCREW-DOWN

## (undated) DEVICE — TBG PENCL TELESCP MEGADYNE SMOKE EVAC 10FT

## (undated) DEVICE — SYS VASOVIEW HEMOPRO ENDOSCOPIC HARVST VESL

## (undated) DEVICE — CONNECT Y INTERSEPT W/LL 3/8 X 3/8 X 3/8IN

## (undated) DEVICE — Device

## (undated) DEVICE — SUT PROLN 3/0 SH D/A 36IN 8522H

## (undated) DEVICE — GLV SURG BIOGEL LTX PF 7 1/2

## (undated) DEVICE — SUT PROLN 4/0 RB1 D/A 36IN 8557H

## (undated) DEVICE — CANN VESL DLP 1WY BLNT/TP 3MM

## (undated) DEVICE — SUT PROLN 4/0 SH D/A 36IN 8521H

## (undated) DEVICE — PK PERFUS CUST W/CARDIOPLEGIA

## (undated) DEVICE — ANTIBACTERIAL UNDYED BRAIDED (POLYGLACTIN 910), SYNTHETIC ABSORBABLE SUTURE: Brand: COATED VICRYL

## (undated) DEVICE — LEVEL SENSORS PADS ARE USED TO ATTACH THE LEVEL SENSORS TO A HARD SHELL RESERVOIR. INCLUDES COUPLING GEL.: Brand: TERUMO® ADVANCED PERFUSION SYSTEM 1

## (undated) DEVICE — TOWEL,OR,DSP,ST,BLUE,STD,4/PK,20PK/CS: Brand: MEDLINE

## (undated) DEVICE — SUT PROLN 7/0 CV BV1 24IN 8304H BX/36

## (undated) DEVICE — PK ATS CUST W CARDIOTOMY RESEVOIR

## (undated) DEVICE — TEMP PACING WIRE: Brand: MYO/WIRE

## (undated) DEVICE — TUBING, SUCTION, 1/4" X 10', STRAIGHT: Brand: MEDLINE

## (undated) DEVICE — CATHETER,URETHRAL,REDRUBBER,STERILE,22FR: Brand: MEDLINE

## (undated) DEVICE — TRAP FLD MINIVAC MEGADYNE 100ML

## (undated) DEVICE — OASIS DRAIN, SINGLE, INLINE & ATS COMPATIBLE: Brand: OASIS

## (undated) DEVICE — AVID DUAL STAGE VENOUS DRAINAGE CANNULA: Brand: AVID DUAL STAGE VENOUS DRAINAGE CANNULA

## (undated) DEVICE — PK HEART OPN 10

## (undated) DEVICE — PAD ARMBRD SURG CONVOL 7.5X20X2IN

## (undated) DEVICE — SENSR CERBRL O2 PK/2

## (undated) DEVICE — STERILE PVP: Brand: MEDLINE INDUSTRIES, INC.

## (undated) DEVICE — EZ GLIDE AORTIC CANNULA: Brand: EDWARDS LIFESCIENCES EZ GLIDE AORTIC CANNULA

## (undated) DEVICE — NDL PERC 1PRT THNWALL W/BASEPLT 18G 7CM

## (undated) DEVICE — ELECTRD BLD EZ CLN STD 2.5IN

## (undated) DEVICE — ADAPT/Y PERFUS DLP FML/LUER COLR/CODE/CLMP 8.9AND25.4CM

## (undated) DEVICE — CVR PROB ULTRASND/TRANSD W/GEL 18X120CM STRL

## (undated) DEVICE — SUT SILK 0/0 CT2 18IN C027D

## (undated) DEVICE — SUT SILK 4/0 TIES 18IN A183H

## (undated) DEVICE — SUT SILK 2 SUTUPAK TIE 60IN SA8H 2STRAND

## (undated) DEVICE — 3M™ MEDIPORE™ H SOFT CLOTH SURGICAL TAPE, 2863, 3 IN X 10 YD, 12/CASE: Brand: 3M™ MEDIPORE™

## (undated) DEVICE — SUT PDS 1 CTX 36IN VIO PDP371T

## (undated) DEVICE — TBG SXN RIGD MINI/SUCKER 9F 4.75IN

## (undated) DEVICE — PATIENT RETURN ELECTRODE, SINGLE-USE, CONTACT QUALITY MONITORING, ADULT, WITH 9FT CORD, FOR PATIENTS WEIGING OVER 33LBS. (15KG): Brand: MEGADYNE

## (undated) DEVICE — TBG SXN INTRACARD RIDGID FLUT 24F .25X13IN A/

## (undated) DEVICE — SUT PROLN 6/0 C1 D/A 30IN 8706H

## (undated) DEVICE — CLTH CLENS READYCLEANSE PERI CARE PK/5

## (undated) DEVICE — BLD SCLPL BEAVR MINI STR 2BVL 180D LF